# Patient Record
Sex: FEMALE | Race: BLACK OR AFRICAN AMERICAN | Employment: OTHER
[De-identification: names, ages, dates, MRNs, and addresses within clinical notes are randomized per-mention and may not be internally consistent; named-entity substitution may affect disease eponyms.]

---

## 2017-01-01 ENCOUNTER — HOME CARE VISIT (OUTPATIENT)
Dept: SCHEDULING | Facility: HOME HEALTH | Age: 82
End: 2017-01-01
Payer: MEDICARE

## 2017-01-01 ENCOUNTER — HOME CARE VISIT (OUTPATIENT)
Dept: HOSPICE | Facility: HOSPICE | Age: 82
End: 2017-01-01
Payer: MEDICARE

## 2017-01-01 ENCOUNTER — HOSPICE ADMISSION (OUTPATIENT)
Dept: HOSPICE | Facility: HOSPICE | Age: 82
End: 2017-01-01
Payer: MEDICARE

## 2017-01-01 ENCOUNTER — APPOINTMENT (OUTPATIENT)
Dept: GENERAL RADIOLOGY | Age: 82
DRG: 280 | End: 2017-01-01
Attending: EMERGENCY MEDICINE
Payer: MEDICARE

## 2017-01-01 ENCOUNTER — APPOINTMENT (OUTPATIENT)
Dept: GENERAL RADIOLOGY | Age: 82
End: 2017-01-01
Attending: EMERGENCY MEDICINE
Payer: MEDICARE

## 2017-01-01 ENCOUNTER — HOSPITAL ENCOUNTER (INPATIENT)
Age: 82
LOS: 4 days | Discharge: NURSING, REHAB OR LONG-TERM CARE | DRG: 280 | End: 2017-06-21
Attending: EMERGENCY MEDICINE | Admitting: HOSPITALIST
Payer: MEDICARE

## 2017-01-01 ENCOUNTER — APPOINTMENT (OUTPATIENT)
Dept: CT IMAGING | Age: 82
DRG: 065 | End: 2017-01-01
Attending: EMERGENCY MEDICINE
Payer: MEDICARE

## 2017-01-01 ENCOUNTER — APPOINTMENT (OUTPATIENT)
Dept: CT IMAGING | Age: 82
End: 2017-01-01
Attending: EMERGENCY MEDICINE
Payer: MEDICARE

## 2017-01-01 ENCOUNTER — APPOINTMENT (OUTPATIENT)
Dept: CT IMAGING | Age: 82
DRG: 065 | End: 2017-01-01
Attending: HOSPITALIST
Payer: MEDICARE

## 2017-01-01 ENCOUNTER — HOSPITAL ENCOUNTER (INPATIENT)
Age: 82
LOS: 3 days | Discharge: NURSING, REHAB OR LONG-TERM CARE | DRG: 065 | End: 2017-05-01
Attending: EMERGENCY MEDICINE | Admitting: HOSPITALIST
Payer: MEDICARE

## 2017-01-01 ENCOUNTER — APPOINTMENT (OUTPATIENT)
Dept: MRI IMAGING | Age: 82
DRG: 065 | End: 2017-01-01
Attending: HOSPITALIST
Payer: MEDICARE

## 2017-01-01 ENCOUNTER — HOSPITAL ENCOUNTER (EMERGENCY)
Age: 82
Discharge: HOME | End: 2017-01-10
Attending: EMERGENCY MEDICINE | Admitting: EMERGENCY MEDICINE
Payer: MEDICARE

## 2017-01-01 ENCOUNTER — HOSPITAL ENCOUNTER (INPATIENT)
Age: 82
LOS: 4 days | Discharge: HOSPICE | DRG: 280 | End: 2017-07-28
Attending: EMERGENCY MEDICINE | Admitting: INTERNAL MEDICINE
Payer: MEDICARE

## 2017-01-01 ENCOUNTER — APPOINTMENT (OUTPATIENT)
Dept: GENERAL RADIOLOGY | Age: 82
DRG: 280 | End: 2017-01-01
Attending: INTERNAL MEDICINE
Payer: MEDICARE

## 2017-01-01 VITALS
WEIGHT: 151.01 LBS | BODY MASS INDEX: 26.76 KG/M2 | HEIGHT: 63 IN | RESPIRATION RATE: 20 BRPM | DIASTOLIC BLOOD PRESSURE: 71 MMHG | OXYGEN SATURATION: 99 % | SYSTOLIC BLOOD PRESSURE: 138 MMHG | HEART RATE: 74 BPM

## 2017-01-01 VITALS
SYSTOLIC BLOOD PRESSURE: 130 MMHG | DIASTOLIC BLOOD PRESSURE: 80 MMHG | HEART RATE: 90 BPM | RESPIRATION RATE: 14 BRPM | OXYGEN SATURATION: 98 %

## 2017-01-01 VITALS
TEMPERATURE: 98 F | RESPIRATION RATE: 16 BRPM | WEIGHT: 143.08 LBS | SYSTOLIC BLOOD PRESSURE: 150 MMHG | BODY MASS INDEX: 25.35 KG/M2 | DIASTOLIC BLOOD PRESSURE: 82 MMHG | OXYGEN SATURATION: 100 % | HEART RATE: 76 BPM

## 2017-01-01 VITALS
TEMPERATURE: 98.7 F | OXYGEN SATURATION: 100 % | WEIGHT: 157.19 LBS | HEIGHT: 63 IN | BODY MASS INDEX: 27.85 KG/M2 | DIASTOLIC BLOOD PRESSURE: 44 MMHG | SYSTOLIC BLOOD PRESSURE: 124 MMHG | RESPIRATION RATE: 17 BRPM | HEART RATE: 64 BPM

## 2017-01-01 VITALS
RESPIRATION RATE: 13 BRPM | SYSTOLIC BLOOD PRESSURE: 116 MMHG | TEMPERATURE: 97.9 F | DIASTOLIC BLOOD PRESSURE: 53 MMHG | OXYGEN SATURATION: 100 % | HEART RATE: 64 BPM | BODY MASS INDEX: 24.59 KG/M2 | HEIGHT: 64 IN | WEIGHT: 144 LBS

## 2017-01-01 VITALS
SYSTOLIC BLOOD PRESSURE: 120 MMHG | OXYGEN SATURATION: 100 % | HEART RATE: 79 BPM | RESPIRATION RATE: 18 BRPM | DIASTOLIC BLOOD PRESSURE: 70 MMHG

## 2017-01-01 VITALS
SYSTOLIC BLOOD PRESSURE: 134 MMHG | RESPIRATION RATE: 18 BRPM | OXYGEN SATURATION: 94 % | HEART RATE: 59 BPM | DIASTOLIC BLOOD PRESSURE: 70 MMHG

## 2017-01-01 VITALS
DIASTOLIC BLOOD PRESSURE: 70 MMHG | RESPIRATION RATE: 21 BRPM | OXYGEN SATURATION: 99 % | HEART RATE: 74 BPM | SYSTOLIC BLOOD PRESSURE: 119 MMHG

## 2017-01-01 VITALS
SYSTOLIC BLOOD PRESSURE: 128 MMHG | HEIGHT: 63 IN | TEMPERATURE: 97.9 F | BODY MASS INDEX: 25.11 KG/M2 | RESPIRATION RATE: 18 BRPM | HEART RATE: 76 BPM | DIASTOLIC BLOOD PRESSURE: 73 MMHG | OXYGEN SATURATION: 100 % | WEIGHT: 141.7 LBS

## 2017-01-01 DIAGNOSIS — D64.9 ANEMIA, UNSPECIFIED: ICD-10-CM

## 2017-01-01 DIAGNOSIS — I50.33 ACUTE ON CHRONIC DIASTOLIC CONGESTIVE HEART FAILURE (HCC): Primary | ICD-10-CM

## 2017-01-01 DIAGNOSIS — I21.4 NSTEMI (NON-ST ELEVATED MYOCARDIAL INFARCTION) (HCC): ICD-10-CM

## 2017-01-01 DIAGNOSIS — R06.02 SOB (SHORTNESS OF BREATH): ICD-10-CM

## 2017-01-01 DIAGNOSIS — R47.81 SLURRED SPEECH: Primary | ICD-10-CM

## 2017-01-01 DIAGNOSIS — N18.9 CKD (CHRONIC KIDNEY DISEASE), UNSPECIFIED STAGE: ICD-10-CM

## 2017-01-01 DIAGNOSIS — R54 ADVANCED AGE: ICD-10-CM

## 2017-01-01 DIAGNOSIS — R06.03 RESPIRATORY DISTRESS: ICD-10-CM

## 2017-01-01 DIAGNOSIS — R53.1 ACUTE LEFT-SIDED WEAKNESS: ICD-10-CM

## 2017-01-01 DIAGNOSIS — Z71.89 ENCOUNTER FOR HOSPICE CARE DISCUSSION: ICD-10-CM

## 2017-01-01 DIAGNOSIS — N30.00 ACUTE CYSTITIS WITHOUT HEMATURIA: Primary | ICD-10-CM

## 2017-01-01 DIAGNOSIS — R77.8 ELEVATED TROPONIN: ICD-10-CM

## 2017-01-01 DIAGNOSIS — Z71.89 GOALS OF CARE, COUNSELING/DISCUSSION: ICD-10-CM

## 2017-01-01 DIAGNOSIS — R06.02 SHORTNESS OF BREATH: ICD-10-CM

## 2017-01-01 DIAGNOSIS — J96.00 ACUTE RESPIRATORY FAILURE, UNSPECIFIED WHETHER WITH HYPOXIA OR HYPERCAPNIA (HCC): ICD-10-CM

## 2017-01-01 DIAGNOSIS — R09.02 HYPOXIA: ICD-10-CM

## 2017-01-01 DIAGNOSIS — R53.81 DEBILITY: ICD-10-CM

## 2017-01-01 DIAGNOSIS — J96.01 ACUTE RESPIRATORY FAILURE WITH HYPOXIA (HCC): ICD-10-CM

## 2017-01-01 DIAGNOSIS — I48.20 CHRONIC ATRIAL FIBRILLATION (HCC): ICD-10-CM

## 2017-01-01 DIAGNOSIS — I50.9 CONGESTIVE HEART FAILURE, UNSPECIFIED CONGESTIVE HEART FAILURE CHRONICITY, UNSPECIFIED CONGESTIVE HEART FAILURE TYPE: Primary | ICD-10-CM

## 2017-01-01 LAB
ALBUMIN SERPL BCP-MCNC: 2 G/DL (ref 3.5–5)
ALBUMIN SERPL BCP-MCNC: 2.1 G/DL (ref 3.5–5)
ALBUMIN SERPL BCP-MCNC: 2.2 G/DL (ref 3.5–5)
ALBUMIN SERPL BCP-MCNC: 2.3 G/DL (ref 3.5–5)
ALBUMIN SERPL BCP-MCNC: 2.5 G/DL (ref 3.5–5)
ALBUMIN/GLOB SERPL: 0.3 {RATIO} (ref 1.1–2.2)
ALBUMIN/GLOB SERPL: 0.3 {RATIO} (ref 1.1–2.2)
ALBUMIN/GLOB SERPL: 0.4 {RATIO} (ref 1.1–2.2)
ALP SERPL-CCNC: 64 U/L (ref 45–117)
ALP SERPL-CCNC: 68 U/L (ref 45–117)
ALP SERPL-CCNC: 76 U/L (ref 45–117)
ALP SERPL-CCNC: 77 U/L (ref 45–117)
ALP SERPL-CCNC: 83 U/L (ref 45–117)
ALP SERPL-CCNC: 94 U/L (ref 45–117)
ALP SERPL-CCNC: 99 U/L (ref 45–117)
ALT SERPL-CCNC: 10 U/L (ref 12–78)
ALT SERPL-CCNC: 15 U/L (ref 12–78)
ALT SERPL-CCNC: 6 U/L (ref 12–78)
ALT SERPL-CCNC: 6 U/L (ref 12–78)
ALT SERPL-CCNC: 7 U/L (ref 12–78)
ALT SERPL-CCNC: 8 U/L (ref 12–78)
ALT SERPL-CCNC: 9 U/L (ref 12–78)
AMORPH CRY URNS QL MICRO: ABNORMAL
ANION GAP BLD CALC-SCNC: 10 MMOL/L (ref 5–15)
ANION GAP BLD CALC-SCNC: 11 MMOL/L (ref 5–15)
ANION GAP BLD CALC-SCNC: 11 MMOL/L (ref 5–15)
ANION GAP BLD CALC-SCNC: 12 MMOL/L (ref 5–15)
ANION GAP BLD CALC-SCNC: 7 MMOL/L (ref 5–15)
ANION GAP BLD CALC-SCNC: 7 MMOL/L (ref 5–15)
ANION GAP BLD CALC-SCNC: 8 MMOL/L (ref 5–15)
ANION GAP BLD CALC-SCNC: 8 MMOL/L (ref 5–15)
ANION GAP BLD CALC-SCNC: 9 MMOL/L (ref 5–15)
ANION GAP BLD CALC-SCNC: 9 MMOL/L (ref 5–15)
APPEARANCE UR: ABNORMAL
APPEARANCE UR: CLEAR
APTT PPP: 25.6 SEC (ref 22.1–32.5)
ARTERIAL PATENCY WRIST A: YES
AST SERPL W P-5'-P-CCNC: 10 U/L (ref 15–37)
AST SERPL W P-5'-P-CCNC: 11 U/L (ref 15–37)
AST SERPL W P-5'-P-CCNC: 15 U/L (ref 15–37)
AST SERPL W P-5'-P-CCNC: 20 U/L (ref 15–37)
AST SERPL W P-5'-P-CCNC: 23 U/L (ref 15–37)
AST SERPL W P-5'-P-CCNC: 8 U/L (ref 15–37)
AST SERPL W P-5'-P-CCNC: 8 U/L (ref 15–37)
ATRIAL RATE: 60 BPM
ATRIAL RATE: 77 BPM
ATRIAL RATE: 94 BPM
BACTERIA SPEC CULT: NORMAL
BACTERIA SPEC CULT: NORMAL
BACTERIA URNS QL MICRO: ABNORMAL /HPF
BACTERIA URNS QL MICRO: NEGATIVE /HPF
BASE DEFICIT BLDA-SCNC: 5.1 MMOL/L
BASOPHILS # BLD AUTO: 0 K/UL (ref 0–0.1)
BASOPHILS # BLD: 0 % (ref 0–1)
BASOPHILS # BLD: 1 % (ref 0–1)
BASOPHILS # BLD: 1 % (ref 0–1)
BDY SITE: ABNORMAL
BILIRUB SERPL-MCNC: 0.4 MG/DL (ref 0.2–1)
BILIRUB SERPL-MCNC: 0.5 MG/DL (ref 0.2–1)
BILIRUB SERPL-MCNC: 0.7 MG/DL (ref 0.2–1)
BILIRUB SERPL-MCNC: 0.8 MG/DL (ref 0.2–1)
BILIRUB SERPL-MCNC: 0.9 MG/DL (ref 0.2–1)
BILIRUB UR QL: NEGATIVE
BILIRUB UR QL: NEGATIVE
BNP SERPL-MCNC: ABNORMAL PG/ML (ref 0–450)
BNP SERPL-MCNC: ABNORMAL PG/ML (ref 0–450)
BUN SERPL-MCNC: 23 MG/DL (ref 6–20)
BUN SERPL-MCNC: 23 MG/DL (ref 6–20)
BUN SERPL-MCNC: 24 MG/DL (ref 6–20)
BUN SERPL-MCNC: 28 MG/DL (ref 6–20)
BUN SERPL-MCNC: 39 MG/DL (ref 6–20)
BUN SERPL-MCNC: 39 MG/DL (ref 6–20)
BUN SERPL-MCNC: 40 MG/DL (ref 6–20)
BUN SERPL-MCNC: 40 MG/DL (ref 6–20)
BUN SERPL-MCNC: 41 MG/DL (ref 6–20)
BUN SERPL-MCNC: 51 MG/DL (ref 6–20)
BUN/CREAT SERPL: 12 (ref 12–20)
BUN/CREAT SERPL: 13 (ref 12–20)
BUN/CREAT SERPL: 14 (ref 12–20)
BUN/CREAT SERPL: 15 (ref 12–20)
BUN/CREAT SERPL: 16 (ref 12–20)
BUN/CREAT SERPL: 16 (ref 12–20)
BUN/CREAT SERPL: 17 (ref 12–20)
BUN/CREAT SERPL: 17 (ref 12–20)
BUN/CREAT SERPL: 19 (ref 12–20)
BUN/CREAT SERPL: 19 (ref 12–20)
CALCIUM SERPL-MCNC: 7.7 MG/DL (ref 8.5–10.1)
CALCIUM SERPL-MCNC: 7.7 MG/DL (ref 8.5–10.1)
CALCIUM SERPL-MCNC: 8 MG/DL (ref 8.5–10.1)
CALCIUM SERPL-MCNC: 8 MG/DL (ref 8.5–10.1)
CALCIUM SERPL-MCNC: 8.1 MG/DL (ref 8.5–10.1)
CALCIUM SERPL-MCNC: 8.2 MG/DL (ref 8.5–10.1)
CALCIUM SERPL-MCNC: 8.3 MG/DL (ref 8.5–10.1)
CALCIUM SERPL-MCNC: 8.5 MG/DL (ref 8.5–10.1)
CALCULATED R AXIS, ECG10: -2 DEGREES
CALCULATED R AXIS, ECG10: -2 DEGREES
CALCULATED R AXIS, ECG10: -5 DEGREES
CALCULATED T AXIS, ECG11: -166 DEGREES
CALCULATED T AXIS, ECG11: 125 DEGREES
CALCULATED T AXIS, ECG11: 153 DEGREES
CAOX CRY URNS QL MICRO: ABNORMAL
CHLORIDE SERPL-SCNC: 103 MMOL/L (ref 97–108)
CHLORIDE SERPL-SCNC: 104 MMOL/L (ref 97–108)
CHLORIDE SERPL-SCNC: 105 MMOL/L (ref 97–108)
CHLORIDE SERPL-SCNC: 106 MMOL/L (ref 97–108)
CHLORIDE SERPL-SCNC: 107 MMOL/L (ref 97–108)
CHLORIDE SERPL-SCNC: 107 MMOL/L (ref 97–108)
CHLORIDE SERPL-SCNC: 108 MMOL/L (ref 97–108)
CHLORIDE SERPL-SCNC: 109 MMOL/L (ref 97–108)
CHOLEST SERPL-MCNC: 125 MG/DL
CK MB CFR SERPL CALC: 1.7 % (ref 0–2.5)
CK MB CFR SERPL CALC: NORMAL % (ref 0–2.5)
CK MB SERPL-MCNC: 1.2 NG/ML (ref 5–25)
CK MB SERPL-MCNC: <1 NG/ML (ref 5–25)
CK SERPL-CCNC: 59 U/L (ref 26–192)
CK SERPL-CCNC: 70 U/L (ref 26–192)
CO2 SERPL-SCNC: 20 MMOL/L (ref 21–32)
CO2 SERPL-SCNC: 21 MMOL/L (ref 21–32)
CO2 SERPL-SCNC: 23 MMOL/L (ref 21–32)
CO2 SERPL-SCNC: 23 MMOL/L (ref 21–32)
CO2 SERPL-SCNC: 25 MMOL/L (ref 21–32)
CO2 SERPL-SCNC: 26 MMOL/L (ref 21–32)
CO2 SERPL-SCNC: 26 MMOL/L (ref 21–32)
CO2 SERPL-SCNC: 27 MMOL/L (ref 21–32)
CO2 SERPL-SCNC: 29 MMOL/L (ref 21–32)
CO2 SERPL-SCNC: 29 MMOL/L (ref 21–32)
COLOR UR: ABNORMAL
COLOR UR: NORMAL
CREAT SERPL-MCNC: 1.77 MG/DL (ref 0.55–1.02)
CREAT SERPL-MCNC: 1.82 MG/DL (ref 0.55–1.02)
CREAT SERPL-MCNC: 1.84 MG/DL (ref 0.55–1.02)
CREAT SERPL-MCNC: 1.98 MG/DL (ref 0.55–1.02)
CREAT SERPL-MCNC: 2.18 MG/DL (ref 0.55–1.02)
CREAT SERPL-MCNC: 2.3 MG/DL (ref 0.55–1.02)
CREAT SERPL-MCNC: 2.3 MG/DL (ref 0.55–1.02)
CREAT SERPL-MCNC: 2.52 MG/DL (ref 0.55–1.02)
CREAT SERPL-MCNC: 2.53 MG/DL (ref 0.55–1.02)
CREAT SERPL-MCNC: 2.66 MG/DL (ref 0.55–1.02)
DIAGNOSIS, 93000: NORMAL
EOSINOPHIL # BLD: 0 K/UL (ref 0–0.4)
EOSINOPHIL # BLD: 0.1 K/UL (ref 0–0.4)
EOSINOPHIL # BLD: 0.2 K/UL (ref 0–0.4)
EOSINOPHIL NFR BLD: 1 % (ref 0–7)
EOSINOPHIL NFR BLD: 2 % (ref 0–7)
EOSINOPHIL NFR BLD: 3 % (ref 0–7)
EOSINOPHIL NFR BLD: 5 % (ref 0–7)
EPAP/CPAP/PEEP, PAPEEP: 5
EPITH CASTS URNS QL MICRO: ABNORMAL /LPF
EPITH CASTS URNS QL MICRO: NORMAL /LPF
ERYTHROCYTE [DISTWIDTH] IN BLOOD BY AUTOMATED COUNT: 14.6 % (ref 11.5–14.5)
ERYTHROCYTE [DISTWIDTH] IN BLOOD BY AUTOMATED COUNT: 14.8 % (ref 11.5–14.5)
ERYTHROCYTE [DISTWIDTH] IN BLOOD BY AUTOMATED COUNT: 15 % (ref 11.5–14.5)
ERYTHROCYTE [DISTWIDTH] IN BLOOD BY AUTOMATED COUNT: 15 % (ref 11.5–14.5)
ERYTHROCYTE [DISTWIDTH] IN BLOOD BY AUTOMATED COUNT: 15.1 % (ref 11.5–14.5)
ERYTHROCYTE [DISTWIDTH] IN BLOOD BY AUTOMATED COUNT: 15.7 % (ref 11.5–14.5)
ERYTHROCYTE [DISTWIDTH] IN BLOOD BY AUTOMATED COUNT: 15.9 % (ref 11.5–14.5)
ERYTHROCYTE [DISTWIDTH] IN BLOOD BY AUTOMATED COUNT: 16 % (ref 11.5–14.5)
ERYTHROCYTE [DISTWIDTH] IN BLOOD BY AUTOMATED COUNT: 16.4 % (ref 11.5–14.5)
EST. AVERAGE GLUCOSE BLD GHB EST-MCNC: NORMAL MG/DL
FIO2 ON VENT: 100 %
GAS FLOW.O2 SETTING OXYMISER: 4 L/MIN
GLOBULIN SER CALC-MCNC: 5.5 G/DL (ref 2–4)
GLOBULIN SER CALC-MCNC: 5.6 G/DL (ref 2–4)
GLOBULIN SER CALC-MCNC: 6 G/DL (ref 2–4)
GLOBULIN SER CALC-MCNC: 6.2 G/DL (ref 2–4)
GLOBULIN SER CALC-MCNC: 6.4 G/DL (ref 2–4)
GLOBULIN SER CALC-MCNC: 6.5 G/DL (ref 2–4)
GLOBULIN SER CALC-MCNC: 6.6 G/DL (ref 2–4)
GLUCOSE BLD STRIP.AUTO-MCNC: 101 MG/DL (ref 65–100)
GLUCOSE BLD STRIP.AUTO-MCNC: 61 MG/DL (ref 65–100)
GLUCOSE BLD STRIP.AUTO-MCNC: 85 MG/DL (ref 65–100)
GLUCOSE BLD STRIP.AUTO-MCNC: 99 MG/DL (ref 65–100)
GLUCOSE SERPL-MCNC: 105 MG/DL (ref 65–100)
GLUCOSE SERPL-MCNC: 110 MG/DL (ref 65–100)
GLUCOSE SERPL-MCNC: 112 MG/DL (ref 65–100)
GLUCOSE SERPL-MCNC: 125 MG/DL (ref 65–100)
GLUCOSE SERPL-MCNC: 139 MG/DL (ref 65–100)
GLUCOSE SERPL-MCNC: 62 MG/DL (ref 65–100)
GLUCOSE SERPL-MCNC: 66 MG/DL (ref 65–100)
GLUCOSE SERPL-MCNC: 71 MG/DL (ref 65–100)
GLUCOSE SERPL-MCNC: 73 MG/DL (ref 65–100)
GLUCOSE SERPL-MCNC: 88 MG/DL (ref 65–100)
GLUCOSE UR STRIP.AUTO-MCNC: NEGATIVE MG/DL
GLUCOSE UR STRIP.AUTO-MCNC: NEGATIVE MG/DL
GRAN CASTS URNS QL MICRO: ABNORMAL /LPF
HBA1C MFR BLD: 4.9 % (ref 4.2–6.3)
HCO3 BLDA-SCNC: 19 MMOL/L (ref 22–26)
HCT VFR BLD AUTO: 24.9 % (ref 35–47)
HCT VFR BLD AUTO: 25.2 % (ref 35–47)
HCT VFR BLD AUTO: 27.6 % (ref 35–47)
HCT VFR BLD AUTO: 27.8 % (ref 35–47)
HCT VFR BLD AUTO: 28.7 % (ref 35–47)
HCT VFR BLD AUTO: 28.7 % (ref 35–47)
HCT VFR BLD AUTO: 30.4 % (ref 35–47)
HCT VFR BLD AUTO: 30.4 % (ref 35–47)
HCT VFR BLD AUTO: 31.6 % (ref 35–47)
HDLC SERPL-MCNC: 53 MG/DL
HDLC SERPL: 2.4 {RATIO} (ref 0–5)
HGB BLD-MCNC: 8 G/DL (ref 11.5–16)
HGB BLD-MCNC: 8.4 G/DL (ref 11.5–16)
HGB BLD-MCNC: 9 G/DL (ref 11.5–16)
HGB BLD-MCNC: 9 G/DL (ref 11.5–16)
HGB BLD-MCNC: 9.2 G/DL (ref 11.5–16)
HGB BLD-MCNC: 9.3 G/DL (ref 11.5–16)
HGB BLD-MCNC: 9.3 G/DL (ref 11.5–16)
HGB BLD-MCNC: 9.6 G/DL (ref 11.5–16)
HGB BLD-MCNC: 9.8 G/DL (ref 11.5–16)
HGB UR QL STRIP: NEGATIVE
HGB UR QL STRIP: NEGATIVE
HYALINE CASTS URNS QL MICRO: ABNORMAL /LPF (ref 0–5)
HYALINE CASTS URNS QL MICRO: NORMAL /LPF (ref 0–5)
INR BLD: 1.2 (ref 0.9–1.2)
INR PPP: 1.2 (ref 0.9–1.1)
IPAP/PIP, IPAPIP: 12
KETONES UR QL STRIP.AUTO: NEGATIVE MG/DL
KETONES UR QL STRIP.AUTO: NEGATIVE MG/DL
LACTATE SERPL-SCNC: 1.6 MMOL/L (ref 0.4–2)
LACTATE SERPL-SCNC: 1.9 MMOL/L (ref 0.4–2)
LACTATE SERPL-SCNC: 2.8 MMOL/L (ref 0.4–2)
LACTATE SERPL-SCNC: 3.5 MMOL/L (ref 0.4–2)
LACTATE SERPL-SCNC: 3.9 MMOL/L (ref 0.4–2)
LDLC SERPL CALC-MCNC: 61.4 MG/DL (ref 0–100)
LEUKOCYTE ESTERASE UR QL STRIP.AUTO: ABNORMAL
LEUKOCYTE ESTERASE UR QL STRIP.AUTO: NEGATIVE
LIPID PROFILE,FLP: NORMAL
LYMPHOCYTES # BLD AUTO: 40 % (ref 12–49)
LYMPHOCYTES # BLD AUTO: 41 % (ref 12–49)
LYMPHOCYTES # BLD AUTO: 45 % (ref 12–49)
LYMPHOCYTES # BLD AUTO: 45 % (ref 12–49)
LYMPHOCYTES # BLD AUTO: 49 % (ref 12–49)
LYMPHOCYTES # BLD AUTO: 51 % (ref 12–49)
LYMPHOCYTES # BLD AUTO: 56 % (ref 12–49)
LYMPHOCYTES # BLD: 1.4 K/UL (ref 0.8–3.5)
LYMPHOCYTES # BLD: 1.5 K/UL (ref 0.8–3.5)
LYMPHOCYTES # BLD: 1.5 K/UL (ref 0.8–3.5)
LYMPHOCYTES # BLD: 1.6 K/UL (ref 0.8–3.5)
LYMPHOCYTES # BLD: 1.7 K/UL (ref 0.8–3.5)
LYMPHOCYTES # BLD: 2 K/UL (ref 0.8–3.5)
LYMPHOCYTES # BLD: 2 K/UL (ref 0.8–3.5)
MAGNESIUM SERPL-MCNC: 2 MG/DL (ref 1.6–2.4)
MAGNESIUM SERPL-MCNC: 2.1 MG/DL (ref 1.6–2.4)
MAGNESIUM SERPL-MCNC: 2.2 MG/DL (ref 1.6–2.4)
MCH RBC QN AUTO: 24.9 PG (ref 26–34)
MCH RBC QN AUTO: 25.7 PG (ref 26–34)
MCH RBC QN AUTO: 25.9 PG (ref 26–34)
MCH RBC QN AUTO: 26.1 PG (ref 26–34)
MCH RBC QN AUTO: 26.1 PG (ref 26–34)
MCH RBC QN AUTO: 26.2 PG (ref 26–34)
MCH RBC QN AUTO: 26.2 PG (ref 26–34)
MCH RBC QN AUTO: 26.4 PG (ref 26–34)
MCH RBC QN AUTO: 27 PG (ref 26–34)
MCHC RBC AUTO-ENTMCNC: 30.6 G/DL (ref 30–36.5)
MCHC RBC AUTO-ENTMCNC: 31 G/DL (ref 30–36.5)
MCHC RBC AUTO-ENTMCNC: 31.6 G/DL (ref 30–36.5)
MCHC RBC AUTO-ENTMCNC: 32.1 G/DL (ref 30–36.5)
MCHC RBC AUTO-ENTMCNC: 32.1 G/DL (ref 30–36.5)
MCHC RBC AUTO-ENTMCNC: 32.4 G/DL (ref 30–36.5)
MCHC RBC AUTO-ENTMCNC: 32.4 G/DL (ref 30–36.5)
MCHC RBC AUTO-ENTMCNC: 32.6 G/DL (ref 30–36.5)
MCHC RBC AUTO-ENTMCNC: 33.3 G/DL (ref 30–36.5)
MCV RBC AUTO: 80.2 FL (ref 80–99)
MCV RBC AUTO: 80.6 FL (ref 80–99)
MCV RBC AUTO: 81 FL (ref 80–99)
MCV RBC AUTO: 81.1 FL (ref 80–99)
MCV RBC AUTO: 81.5 FL (ref 80–99)
MCV RBC AUTO: 81.5 FL (ref 80–99)
MCV RBC AUTO: 81.8 FL (ref 80–99)
MCV RBC AUTO: 82.2 FL (ref 80–99)
MCV RBC AUTO: 82.7 FL (ref 80–99)
MONOCYTES # BLD: 0.1 K/UL (ref 0–1)
MONOCYTES # BLD: 0.2 K/UL (ref 0–1)
MONOCYTES # BLD: 0.3 K/UL (ref 0–1)
MONOCYTES # BLD: 0.4 K/UL (ref 0–1)
MONOCYTES # BLD: 0.5 K/UL (ref 0–1)
MONOCYTES NFR BLD AUTO: 10 % (ref 5–13)
MONOCYTES NFR BLD AUTO: 10 % (ref 5–13)
MONOCYTES NFR BLD AUTO: 13 % (ref 5–13)
MONOCYTES NFR BLD AUTO: 14 % (ref 5–13)
MONOCYTES NFR BLD AUTO: 3 % (ref 5–13)
MONOCYTES NFR BLD AUTO: 6 % (ref 5–13)
MONOCYTES NFR BLD AUTO: 9 % (ref 5–13)
NEUTS SEG # BLD: 1.1 K/UL (ref 1.8–8)
NEUTS SEG # BLD: 1.2 K/UL (ref 1.8–8)
NEUTS SEG # BLD: 1.3 K/UL (ref 1.8–8)
NEUTS SEG # BLD: 1.4 K/UL (ref 1.8–8)
NEUTS SEG # BLD: 1.5 K/UL (ref 1.8–8)
NEUTS SEG # BLD: 2 K/UL (ref 1.8–8)
NEUTS SEG # BLD: 2.1 K/UL (ref 1.8–8)
NEUTS SEG NFR BLD AUTO: 32 % (ref 32–75)
NEUTS SEG NFR BLD AUTO: 36 % (ref 32–75)
NEUTS SEG NFR BLD AUTO: 42 % (ref 32–75)
NEUTS SEG NFR BLD AUTO: 43 % (ref 32–75)
NEUTS SEG NFR BLD AUTO: 44 % (ref 32–75)
NEUTS SEG NFR BLD AUTO: 44 % (ref 32–75)
NEUTS SEG NFR BLD AUTO: 48 % (ref 32–75)
NITRITE UR QL STRIP.AUTO: NEGATIVE
NITRITE UR QL STRIP.AUTO: NEGATIVE
PCO2 BLDA: 32 MMHG (ref 35–45)
PH BLDA: 7.39 [PH] (ref 7.35–7.45)
PH UR STRIP: 5 [PH] (ref 5–8)
PH UR STRIP: 6.5 [PH] (ref 5–8)
PLATELET # BLD AUTO: 192 K/UL (ref 150–400)
PLATELET # BLD AUTO: 225 K/UL (ref 150–400)
PLATELET # BLD AUTO: 231 K/UL (ref 150–400)
PLATELET # BLD AUTO: 232 K/UL (ref 150–400)
PLATELET # BLD AUTO: 247 K/UL (ref 150–400)
PLATELET # BLD AUTO: 253 K/UL (ref 150–400)
PLATELET # BLD AUTO: 253 K/UL (ref 150–400)
PLATELET # BLD AUTO: 302 K/UL (ref 150–400)
PLATELET # BLD AUTO: 333 K/UL (ref 150–400)
PO2 BLDA: 399 MMHG (ref 80–100)
POTASSIUM SERPL-SCNC: 3.1 MMOL/L (ref 3.5–5.1)
POTASSIUM SERPL-SCNC: 3.7 MMOL/L (ref 3.5–5.1)
POTASSIUM SERPL-SCNC: 3.7 MMOL/L (ref 3.5–5.1)
POTASSIUM SERPL-SCNC: 3.8 MMOL/L (ref 3.5–5.1)
POTASSIUM SERPL-SCNC: 3.8 MMOL/L (ref 3.5–5.1)
POTASSIUM SERPL-SCNC: 4 MMOL/L (ref 3.5–5.1)
POTASSIUM SERPL-SCNC: 4 MMOL/L (ref 3.5–5.1)
POTASSIUM SERPL-SCNC: 4.1 MMOL/L (ref 3.5–5.1)
POTASSIUM SERPL-SCNC: 4.1 MMOL/L (ref 3.5–5.1)
POTASSIUM SERPL-SCNC: 4.5 MMOL/L (ref 3.5–5.1)
PROT SERPL-MCNC: 7.5 G/DL (ref 6.4–8.2)
PROT SERPL-MCNC: 7.7 G/DL (ref 6.4–8.2)
PROT SERPL-MCNC: 8.3 G/DL (ref 6.4–8.2)
PROT SERPL-MCNC: 8.4 G/DL (ref 6.4–8.2)
PROT SERPL-MCNC: 8.6 G/DL (ref 6.4–8.2)
PROT SERPL-MCNC: 8.7 G/DL (ref 6.4–8.2)
PROT SERPL-MCNC: 9.1 G/DL (ref 6.4–8.2)
PROT UR STRIP-MCNC: NEGATIVE MG/DL
PROT UR STRIP-MCNC: NEGATIVE MG/DL
PROTHROMBIN TIME: 12.3 SEC (ref 9–11.1)
Q-T INTERVAL, ECG07: 394 MS
Q-T INTERVAL, ECG07: 428 MS
Q-T INTERVAL, ECG07: 466 MS
QRS DURATION, ECG06: 64 MS
QRS DURATION, ECG06: 70 MS
QRS DURATION, ECG06: 76 MS
QTC CALCULATION (BEZET), ECG08: 430 MS
QTC CALCULATION (BEZET), ECG08: 489 MS
QTC CALCULATION (BEZET), ECG08: 495 MS
RBC # BLD AUTO: 3.07 M/UL (ref 3.8–5.2)
RBC # BLD AUTO: 3.11 M/UL (ref 3.8–5.2)
RBC # BLD AUTO: 3.44 M/UL (ref 3.8–5.2)
RBC # BLD AUTO: 3.45 M/UL (ref 3.8–5.2)
RBC # BLD AUTO: 3.51 M/UL (ref 3.8–5.2)
RBC # BLD AUTO: 3.52 M/UL (ref 3.8–5.2)
RBC # BLD AUTO: 3.7 M/UL (ref 3.8–5.2)
RBC # BLD AUTO: 3.73 M/UL (ref 3.8–5.2)
RBC # BLD AUTO: 3.82 M/UL (ref 3.8–5.2)
RBC #/AREA URNS HPF: ABNORMAL /HPF (ref 0–5)
RBC #/AREA URNS HPF: NORMAL /HPF (ref 0–5)
SAO2 % BLD: 100 % (ref 92–97)
SAO2% DEVICE SAO2% SENSOR NAME: ABNORMAL
SERVICE CMNT-IMP: ABNORMAL
SERVICE CMNT-IMP: ABNORMAL
SERVICE CMNT-IMP: NORMAL
SODIUM SERPL-SCNC: 139 MMOL/L (ref 136–145)
SODIUM SERPL-SCNC: 140 MMOL/L (ref 136–145)
SODIUM SERPL-SCNC: 141 MMOL/L (ref 136–145)
SODIUM SERPL-SCNC: 142 MMOL/L (ref 136–145)
SP GR UR REFRACTOMETRY: 1.01 (ref 1–1.03)
SP GR UR REFRACTOMETRY: 1.01 (ref 1–1.03)
SPECIMEN SITE: ABNORMAL
THERAPEUTIC RANGE,PTTT: NORMAL SECS (ref 58–77)
TRIGL SERPL-MCNC: 53 MG/DL (ref ?–150)
TROPONIN I SERPL-MCNC: 1.64 NG/ML
TROPONIN I SERPL-MCNC: 2.63 NG/ML
TROPONIN I SERPL-MCNC: 3.24 NG/ML
UROBILINOGEN UR QL STRIP.AUTO: 0.2 EU/DL (ref 0.2–1)
UROBILINOGEN UR QL STRIP.AUTO: 1 EU/DL (ref 0.2–1)
VENTILATION MODE VENT: ABNORMAL
VENTRICULAR RATE, ECG03: 61 BPM
VENTRICULAR RATE, ECG03: 68 BPM
VENTRICULAR RATE, ECG03: 93 BPM
VLDLC SERPL CALC-MCNC: 10.6 MG/DL
WBC # BLD AUTO: 3.2 K/UL (ref 3.6–11)
WBC # BLD AUTO: 3.4 K/UL (ref 3.6–11)
WBC # BLD AUTO: 3.5 K/UL (ref 3.6–11)
WBC # BLD AUTO: 3.5 K/UL (ref 3.6–11)
WBC # BLD AUTO: 3.8 K/UL (ref 3.6–11)
WBC # BLD AUTO: 4.4 K/UL (ref 3.6–11)
WBC # BLD AUTO: 4.5 K/UL (ref 3.6–11)
WBC URNS QL MICRO: ABNORMAL /HPF (ref 0–4)
WBC URNS QL MICRO: NORMAL /HPF (ref 0–4)

## 2017-01-01 PROCEDURE — A6250 SKIN SEAL PROTECT MOISTURIZR: HCPCS

## 2017-01-01 PROCEDURE — A9270 NON-COVERED ITEM OR SERVICE: HCPCS

## 2017-01-01 PROCEDURE — 0651 HSPC ROUTINE HOME CARE

## 2017-01-01 PROCEDURE — 74011250637 HC RX REV CODE- 250/637: Performed by: HOSPITALIST

## 2017-01-01 PROCEDURE — 80048 BASIC METABOLIC PNL TOTAL CA: CPT | Performed by: INTERNAL MEDICINE

## 2017-01-01 PROCEDURE — 84484 ASSAY OF TROPONIN QUANT: CPT | Performed by: EMERGENCY MEDICINE

## 2017-01-01 PROCEDURE — 77010033678 HC OXYGEN DAILY

## 2017-01-01 PROCEDURE — 74011250636 HC RX REV CODE- 250/636: Performed by: INTERNAL MEDICINE

## 2017-01-01 PROCEDURE — 83735 ASSAY OF MAGNESIUM: CPT | Performed by: INTERNAL MEDICINE

## 2017-01-01 PROCEDURE — 97165 OT EVAL LOW COMPLEX 30 MIN: CPT

## 2017-01-01 PROCEDURE — 80053 COMPREHEN METABOLIC PANEL: CPT | Performed by: INTERNAL MEDICINE

## 2017-01-01 PROCEDURE — 36415 COLL VENOUS BLD VENIPUNCTURE: CPT | Performed by: INTERNAL MEDICINE

## 2017-01-01 PROCEDURE — 83605 ASSAY OF LACTIC ACID: CPT | Performed by: EMERGENCY MEDICINE

## 2017-01-01 PROCEDURE — 97161 PT EVAL LOW COMPLEX 20 MIN: CPT

## 2017-01-01 PROCEDURE — 74011250637 HC RX REV CODE- 250/637: Performed by: INTERNAL MEDICINE

## 2017-01-01 PROCEDURE — 85025 COMPLETE CBC W/AUTO DIFF WBC: CPT | Performed by: EMERGENCY MEDICINE

## 2017-01-01 PROCEDURE — 65660000000 HC RM CCU STEPDOWN

## 2017-01-01 PROCEDURE — 74011250636 HC RX REV CODE- 250/636: Performed by: EMERGENCY MEDICINE

## 2017-01-01 PROCEDURE — T4523 ADULT SIZE BRIEF/DIAPER LG: HCPCS

## 2017-01-01 PROCEDURE — 94660 CPAP INITIATION&MGMT: CPT

## 2017-01-01 PROCEDURE — 99285 EMERGENCY DEPT VISIT HI MDM: CPT

## 2017-01-01 PROCEDURE — G8997 SWALLOW GOAL STATUS: HCPCS

## 2017-01-01 PROCEDURE — G0299 HHS/HOSPICE OF RN EA 15 MIN: HCPCS

## 2017-01-01 PROCEDURE — 93306 TTE W/DOPPLER COMPLETE: CPT

## 2017-01-01 PROCEDURE — C1751 CATH, INF, PER/CENT/MIDLINE: HCPCS

## 2017-01-01 PROCEDURE — 80061 LIPID PANEL: CPT | Performed by: HOSPITALIST

## 2017-01-01 PROCEDURE — 96361 HYDRATE IV INFUSION ADD-ON: CPT

## 2017-01-01 PROCEDURE — 74011250637 HC RX REV CODE- 250/637: Performed by: NURSE PRACTITIONER

## 2017-01-01 PROCEDURE — 74011250636 HC RX REV CODE- 250/636: Performed by: HOSPITALIST

## 2017-01-01 PROCEDURE — G0300 HHS/HOSPICE OF LPN EA 15 MIN: HCPCS

## 2017-01-01 PROCEDURE — 36415 COLL VENOUS BLD VENIPUNCTURE: CPT | Performed by: EMERGENCY MEDICINE

## 2017-01-01 PROCEDURE — 36600 WITHDRAWAL OF ARTERIAL BLOOD: CPT | Performed by: EMERGENCY MEDICINE

## 2017-01-01 PROCEDURE — 85027 COMPLETE CBC AUTOMATED: CPT | Performed by: INTERNAL MEDICINE

## 2017-01-01 PROCEDURE — 83036 HEMOGLOBIN GLYCOSYLATED A1C: CPT | Performed by: HOSPITALIST

## 2017-01-01 PROCEDURE — 80053 COMPREHEN METABOLIC PANEL: CPT | Performed by: EMERGENCY MEDICINE

## 2017-01-01 PROCEDURE — 93005 ELECTROCARDIOGRAM TRACING: CPT

## 2017-01-01 PROCEDURE — 74011250637 HC RX REV CODE- 250/637: Performed by: EMERGENCY MEDICINE

## 2017-01-01 PROCEDURE — 70450 CT HEAD/BRAIN W/O DYE: CPT

## 2017-01-01 PROCEDURE — 85027 COMPLETE CBC AUTOMATED: CPT | Performed by: HOSPITALIST

## 2017-01-01 PROCEDURE — 70551 MRI BRAIN STEM W/O DYE: CPT

## 2017-01-01 PROCEDURE — 92610 EVALUATE SWALLOWING FUNCTION: CPT

## 2017-01-01 PROCEDURE — 93880 EXTRACRANIAL BILAT STUDY: CPT

## 2017-01-01 PROCEDURE — 77030032490 HC SLV COMPR SCD KNE COVD -B

## 2017-01-01 PROCEDURE — HHS10554 SHAMPOO/BODY WASH 8 OZ ALOE VESTA

## 2017-01-01 PROCEDURE — G8978 MOBILITY CURRENT STATUS: HCPCS

## 2017-01-01 PROCEDURE — 83735 ASSAY OF MAGNESIUM: CPT | Performed by: HOSPITALIST

## 2017-01-01 PROCEDURE — 74011000250 HC RX REV CODE- 250

## 2017-01-01 PROCEDURE — 77030012879 HC MSK CPAP FLL FAC PHIL -B

## 2017-01-01 PROCEDURE — G8978 MOBILITY CURRENT STATUS: HCPCS | Performed by: PHYSICAL THERAPIST

## 2017-01-01 PROCEDURE — G8979 MOBILITY GOAL STATUS: HCPCS

## 2017-01-01 PROCEDURE — HOSPICE MEDICATION HC HH HOSPICE MEDICATION

## 2017-01-01 PROCEDURE — 71010 XR CHEST PORT: CPT

## 2017-01-01 PROCEDURE — 85025 COMPLETE CBC W/AUTO DIFF WBC: CPT | Performed by: INTERNAL MEDICINE

## 2017-01-01 PROCEDURE — 94761 N-INVAS EAR/PLS OXIMETRY MLT: CPT

## 2017-01-01 PROCEDURE — G8998 SWALLOW D/C STATUS: HCPCS

## 2017-01-01 PROCEDURE — 80048 BASIC METABOLIC PNL TOTAL CA: CPT | Performed by: HOSPITALIST

## 2017-01-01 PROCEDURE — 77030013140 HC MSK NEB VYRM -A

## 2017-01-01 PROCEDURE — 81001 URINALYSIS AUTO W/SCOPE: CPT | Performed by: INTERNAL MEDICINE

## 2017-01-01 PROCEDURE — 76450000000

## 2017-01-01 PROCEDURE — 97530 THERAPEUTIC ACTIVITIES: CPT | Performed by: PHYSICAL THERAPIST

## 2017-01-01 PROCEDURE — 81001 URINALYSIS AUTO W/SCOPE: CPT | Performed by: EMERGENCY MEDICINE

## 2017-01-01 PROCEDURE — 3336500001 HSPC ELECTION

## 2017-01-01 PROCEDURE — 82962 GLUCOSE BLOOD TEST: CPT

## 2017-01-01 PROCEDURE — 36415 COLL VENOUS BLD VENIPUNCTURE: CPT | Performed by: HOSPITALIST

## 2017-01-01 PROCEDURE — 82803 BLOOD GASES ANY COMBINATION: CPT | Performed by: EMERGENCY MEDICINE

## 2017-01-01 PROCEDURE — 85610 PROTHROMBIN TIME: CPT | Performed by: EMERGENCY MEDICINE

## 2017-01-01 PROCEDURE — 83880 ASSAY OF NATRIURETIC PEPTIDE: CPT | Performed by: EMERGENCY MEDICINE

## 2017-01-01 PROCEDURE — 82550 ASSAY OF CK (CPK): CPT | Performed by: EMERGENCY MEDICINE

## 2017-01-01 PROCEDURE — 96360 HYDRATION IV INFUSION INIT: CPT

## 2017-01-01 PROCEDURE — G0155 HHCP-SVS OF CSW,EA 15 MIN: HCPCS

## 2017-01-01 PROCEDURE — 97162 PT EVAL MOD COMPLEX 30 MIN: CPT | Performed by: PHYSICAL THERAPIST

## 2017-01-01 PROCEDURE — 84484 ASSAY OF TROPONIN QUANT: CPT | Performed by: HOSPITALIST

## 2017-01-01 PROCEDURE — 85610 PROTHROMBIN TIME: CPT

## 2017-01-01 PROCEDURE — 73130 X-RAY EXAM OF HAND: CPT

## 2017-01-01 PROCEDURE — 77030011943

## 2017-01-01 PROCEDURE — G8979 MOBILITY GOAL STATUS: HCPCS | Performed by: PHYSICAL THERAPIST

## 2017-01-01 PROCEDURE — 97535 SELF CARE MNGMENT TRAINING: CPT

## 2017-01-01 PROCEDURE — G0156 HHCP-SVS OF AIDE,EA 15 MIN: HCPCS

## 2017-01-01 PROCEDURE — 97116 GAIT TRAINING THERAPY: CPT

## 2017-01-01 PROCEDURE — 85730 THROMBOPLASTIN TIME PARTIAL: CPT | Performed by: EMERGENCY MEDICINE

## 2017-01-01 PROCEDURE — 83735 ASSAY OF MAGNESIUM: CPT | Performed by: EMERGENCY MEDICINE

## 2017-01-01 PROCEDURE — G8996 SWALLOW CURRENT STATUS: HCPCS

## 2017-01-01 PROCEDURE — 83605 ASSAY OF LACTIC ACID: CPT | Performed by: HOSPITALIST

## 2017-01-01 RX ORDER — ACETAMINOPHEN 325 MG/1
650 TABLET ORAL
Status: DISCONTINUED | OUTPATIENT
Start: 2017-01-01 | End: 2017-01-01 | Stop reason: HOSPADM

## 2017-01-01 RX ORDER — ACETAMINOPHEN 650 MG/1
650 SUPPOSITORY RECTAL
Status: DISCONTINUED | OUTPATIENT
Start: 2017-01-01 | End: 2017-01-01 | Stop reason: HOSPADM

## 2017-01-01 RX ORDER — ADHESIVE BANDAGE
30 BANDAGE TOPICAL DAILY PRN
COMMUNITY

## 2017-01-01 RX ORDER — ISOSORBIDE MONONITRATE 30 MG/1
30 TABLET, EXTENDED RELEASE ORAL DAILY
Status: DISCONTINUED | OUTPATIENT
Start: 2017-01-01 | End: 2017-01-01 | Stop reason: HOSPADM

## 2017-01-01 RX ORDER — FUROSEMIDE 10 MG/ML
40 INJECTION INTRAMUSCULAR; INTRAVENOUS 2 TIMES DAILY
Status: DISCONTINUED | OUTPATIENT
Start: 2017-01-01 | End: 2017-01-01

## 2017-01-01 RX ORDER — FUROSEMIDE 20 MG/1
20 TABLET ORAL 2 TIMES DAILY
Status: ON HOLD | COMMUNITY
End: 2017-01-01

## 2017-01-01 RX ORDER — HEPARIN SODIUM 5000 [USP'U]/ML
5000 INJECTION, SOLUTION INTRAVENOUS; SUBCUTANEOUS EVERY 12 HOURS
Status: DISCONTINUED | OUTPATIENT
Start: 2017-01-01 | End: 2017-01-01 | Stop reason: HOSPADM

## 2017-01-01 RX ORDER — POTASSIUM CHLORIDE 750 MG/1
10 TABLET, FILM COATED, EXTENDED RELEASE ORAL 2 TIMES DAILY
Status: DISCONTINUED | OUTPATIENT
Start: 2017-01-01 | End: 2017-01-01

## 2017-01-01 RX ORDER — MELATONIN
1000 DAILY
Status: DISCONTINUED | OUTPATIENT
Start: 2017-01-01 | End: 2017-01-01 | Stop reason: HOSPADM

## 2017-01-01 RX ORDER — CARVEDILOL 12.5 MG/1
12.5 TABLET ORAL 2 TIMES DAILY WITH MEALS
Status: DISCONTINUED | OUTPATIENT
Start: 2017-01-01 | End: 2017-01-01 | Stop reason: HOSPADM

## 2017-01-01 RX ORDER — FUROSEMIDE 40 MG/1
40 TABLET ORAL
Status: DISCONTINUED | OUTPATIENT
Start: 2017-01-01 | End: 2017-01-01 | Stop reason: HOSPADM

## 2017-01-01 RX ORDER — FUROSEMIDE 10 MG/ML
80 INJECTION INTRAMUSCULAR; INTRAVENOUS
Status: COMPLETED | OUTPATIENT
Start: 2017-01-01 | End: 2017-01-01

## 2017-01-01 RX ORDER — ASPIRIN 81 MG/1
81 TABLET ORAL DAILY
Status: DISCONTINUED | OUTPATIENT
Start: 2017-01-01 | End: 2017-01-01 | Stop reason: HOSPADM

## 2017-01-01 RX ORDER — TRAMADOL HYDROCHLORIDE 50 MG/1
50 TABLET ORAL
Qty: 40 TAB | Refills: 0 | Status: SHIPPED | OUTPATIENT
Start: 2017-01-01

## 2017-01-01 RX ORDER — PRAVASTATIN SODIUM 20 MG/1
20 TABLET ORAL
Status: DISCONTINUED | OUTPATIENT
Start: 2017-01-01 | End: 2017-01-01 | Stop reason: HOSPADM

## 2017-01-01 RX ORDER — ENOXAPARIN SODIUM 100 MG/ML
30 INJECTION SUBCUTANEOUS DAILY
Status: DISCONTINUED | OUTPATIENT
Start: 2017-01-01 | End: 2017-01-01 | Stop reason: HOSPADM

## 2017-01-01 RX ORDER — LOPERAMIDE HYDROCHLORIDE 2 MG/1
2 CAPSULE ORAL
COMMUNITY

## 2017-01-01 RX ORDER — ONDANSETRON 2 MG/ML
4 INJECTION INTRAMUSCULAR; INTRAVENOUS
Status: DISCONTINUED | OUTPATIENT
Start: 2017-01-01 | End: 2017-01-01 | Stop reason: HOSPADM

## 2017-01-01 RX ORDER — POTASSIUM CHLORIDE 750 MG/1
10 TABLET, FILM COATED, EXTENDED RELEASE ORAL DAILY
Status: DISCONTINUED | OUTPATIENT
Start: 2017-01-01 | End: 2017-01-01 | Stop reason: HOSPADM

## 2017-01-01 RX ORDER — SODIUM CHLORIDE 0.9 % (FLUSH) 0.9 %
5-10 SYRINGE (ML) INJECTION AS NEEDED
Status: DISCONTINUED | OUTPATIENT
Start: 2017-01-01 | End: 2017-01-01 | Stop reason: HOSPADM

## 2017-01-01 RX ORDER — MELATONIN
1000 DAILY
COMMUNITY
End: 2017-01-01 | Stop reason: ALTCHOICE

## 2017-01-01 RX ORDER — DEXTROMETHORPHAN POLISTIREX 30 MG/5 ML
SUSPENSION, EXTENDED RELEASE 12 HR ORAL AS NEEDED
COMMUNITY

## 2017-01-01 RX ORDER — POTASSIUM CHLORIDE 750 MG/1
10 TABLET, FILM COATED, EXTENDED RELEASE ORAL DAILY
Qty: 30 TAB | Refills: 1 | Status: SHIPPED | OUTPATIENT
Start: 2017-01-01

## 2017-01-01 RX ORDER — PRAVASTATIN SODIUM 10 MG/1
20 TABLET ORAL
Status: DISCONTINUED | OUTPATIENT
Start: 2017-01-01 | End: 2017-01-01 | Stop reason: HOSPADM

## 2017-01-01 RX ORDER — FUROSEMIDE 20 MG/1
20 TABLET ORAL 2 TIMES DAILY
Status: DISCONTINUED | OUTPATIENT
Start: 2017-01-01 | End: 2017-01-01 | Stop reason: HOSPADM

## 2017-01-01 RX ORDER — TRAMADOL HYDROCHLORIDE 50 MG/1
50 TABLET ORAL
Status: DISCONTINUED | OUTPATIENT
Start: 2017-01-01 | End: 2017-01-01 | Stop reason: HOSPADM

## 2017-01-01 RX ORDER — AMLODIPINE BESYLATE 5 MG/1
10 TABLET ORAL DAILY
Status: DISCONTINUED | OUTPATIENT
Start: 2017-01-01 | End: 2017-01-01 | Stop reason: HOSPADM

## 2017-01-01 RX ORDER — SODIUM CHLORIDE 0.9 % (FLUSH) 0.9 %
5-10 SYRINGE (ML) INJECTION EVERY 8 HOURS
Status: DISCONTINUED | OUTPATIENT
Start: 2017-01-01 | End: 2017-01-01 | Stop reason: HOSPADM

## 2017-01-01 RX ORDER — FACIAL-BODY WIPES
10 EACH TOPICAL AS NEEDED
COMMUNITY

## 2017-01-01 RX ORDER — FUROSEMIDE 10 MG/ML
40 INJECTION INTRAMUSCULAR; INTRAVENOUS
Status: COMPLETED | OUTPATIENT
Start: 2017-01-01 | End: 2017-01-01

## 2017-01-01 RX ORDER — LISINOPRIL 10 MG/1
20 TABLET ORAL DAILY
Status: DISCONTINUED | OUTPATIENT
Start: 2017-01-01 | End: 2017-01-01 | Stop reason: HOSPADM

## 2017-01-01 RX ORDER — PRAVASTATIN SODIUM 20 MG/1
20 TABLET ORAL
COMMUNITY
End: 2017-01-01

## 2017-01-01 RX ORDER — FUROSEMIDE 40 MG/1
40 TABLET ORAL DAILY
Status: DISCONTINUED | OUTPATIENT
Start: 2017-01-01 | End: 2017-01-01 | Stop reason: HOSPADM

## 2017-01-01 RX ORDER — ENOXAPARIN SODIUM 100 MG/ML
40 INJECTION SUBCUTANEOUS EVERY 24 HOURS
Status: DISCONTINUED | OUTPATIENT
Start: 2017-01-01 | End: 2017-01-01 | Stop reason: SDUPTHER

## 2017-01-01 RX ORDER — DEXTROSE 50 % IN WATER (D50W) INTRAVENOUS SYRINGE
Status: COMPLETED
Start: 2017-01-01 | End: 2017-01-01

## 2017-01-01 RX ORDER — ENOXAPARIN SODIUM 100 MG/ML
30 INJECTION SUBCUTANEOUS DAILY
Status: DISCONTINUED | OUTPATIENT
Start: 2017-01-01 | End: 2017-01-01

## 2017-01-01 RX ORDER — FUROSEMIDE 10 MG/ML
40 INJECTION INTRAMUSCULAR; INTRAVENOUS EVERY 12 HOURS
Status: DISCONTINUED | OUTPATIENT
Start: 2017-01-01 | End: 2017-01-01

## 2017-01-01 RX ORDER — ISOSORBIDE MONONITRATE 30 MG/1
60 TABLET, EXTENDED RELEASE ORAL DAILY
Status: DISCONTINUED | OUTPATIENT
Start: 2017-01-01 | End: 2017-01-01 | Stop reason: HOSPADM

## 2017-01-01 RX ORDER — ISOSORBIDE MONONITRATE 30 MG/1
30 TABLET, EXTENDED RELEASE ORAL DAILY
COMMUNITY
End: 2017-01-01 | Stop reason: ALTCHOICE

## 2017-01-01 RX ORDER — FUROSEMIDE 40 MG/1
40 TABLET ORAL DAILY
Qty: 30 TAB | Refills: 1 | Status: ON HOLD | OUTPATIENT
Start: 2017-01-01 | End: 2017-01-01

## 2017-01-01 RX ORDER — FUROSEMIDE 40 MG/1
40 TABLET ORAL 2 TIMES DAILY
Qty: 30 TAB | Refills: 1 | Status: SHIPPED | OUTPATIENT
Start: 2017-01-01

## 2017-01-01 RX ORDER — TRAMADOL HYDROCHLORIDE 50 MG/1
50 TABLET ORAL
Status: ON HOLD | COMMUNITY
End: 2017-01-01

## 2017-01-01 RX ORDER — DEXTROSE 50 % IN WATER (D50W) INTRAVENOUS SYRINGE
Status: DISPENSED
Start: 2017-01-01 | End: 2017-01-01

## 2017-01-01 RX ORDER — IPRATROPIUM BROMIDE AND ALBUTEROL SULFATE 2.5; .5 MG/3ML; MG/3ML
3 SOLUTION RESPIRATORY (INHALATION)
Status: DISCONTINUED | OUTPATIENT
Start: 2017-01-01 | End: 2017-01-01 | Stop reason: HOSPADM

## 2017-01-01 RX ORDER — POTASSIUM CHLORIDE 750 MG/1
40 TABLET, FILM COATED, EXTENDED RELEASE ORAL
Status: COMPLETED | OUTPATIENT
Start: 2017-01-01 | End: 2017-01-01

## 2017-01-01 RX ORDER — CEPHALEXIN 500 MG/1
500 CAPSULE ORAL
Status: COMPLETED | OUTPATIENT
Start: 2017-01-01 | End: 2017-01-01

## 2017-01-01 RX ORDER — COLCHICINE 0.6 MG/1
0.6 TABLET ORAL
COMMUNITY
End: 2017-01-01 | Stop reason: ALTCHOICE

## 2017-01-01 RX ORDER — ACETAMINOPHEN 325 MG/1
650 TABLET ORAL
Qty: 40 TAB | Refills: 0 | Status: SHIPPED | OUTPATIENT
Start: 2017-01-01

## 2017-01-01 RX ORDER — CEPHALEXIN 500 MG/1
500 CAPSULE ORAL 2 TIMES DAILY
Qty: 10 CAP | Refills: 0 | Status: SHIPPED | OUTPATIENT
Start: 2017-01-01 | End: 2017-01-01

## 2017-01-01 RX ORDER — ASPIRIN 325 MG
325 TABLET ORAL ONCE
Status: COMPLETED | OUTPATIENT
Start: 2017-01-01 | End: 2017-01-01

## 2017-01-01 RX ORDER — CLOPIDOGREL BISULFATE 75 MG/1
75 TABLET ORAL DAILY
Status: DISCONTINUED | OUTPATIENT
Start: 2017-01-01 | End: 2017-01-01 | Stop reason: HOSPADM

## 2017-01-01 RX ORDER — FUROSEMIDE 10 MG/ML
40 INJECTION INTRAMUSCULAR; INTRAVENOUS DAILY
Status: DISCONTINUED | OUTPATIENT
Start: 2017-01-01 | End: 2017-01-01

## 2017-01-01 RX ORDER — COLCHICINE 0.6 MG/1
0.6 TABLET ORAL
Status: DISCONTINUED | OUTPATIENT
Start: 2017-01-01 | End: 2017-01-01 | Stop reason: HOSPADM

## 2017-01-01 RX ADMIN — PRAVASTATIN SODIUM 20 MG: 10 TABLET ORAL at 23:07

## 2017-01-01 RX ADMIN — ISOSORBIDE MONONITRATE 30 MG: 30 TABLET, EXTENDED RELEASE ORAL at 10:12

## 2017-01-01 RX ADMIN — ENOXAPARIN SODIUM 30 MG: 30 INJECTION SUBCUTANEOUS at 10:05

## 2017-01-01 RX ADMIN — MORPHINE SULFATE 5 MG: 20 SOLUTION ORAL at 10:07

## 2017-01-01 RX ADMIN — CARVEDILOL 12.5 MG: 12.5 TABLET, FILM COATED ORAL at 10:05

## 2017-01-01 RX ADMIN — ASPIRIN 81 MG: 81 TABLET, COATED ORAL at 08:23

## 2017-01-01 RX ADMIN — ASPIRIN 81 MG: 81 TABLET, COATED ORAL at 09:52

## 2017-01-01 RX ADMIN — NITROGLYCERIN 1 INCH: 20 OINTMENT TOPICAL at 23:00

## 2017-01-01 RX ADMIN — Medication 10 ML: at 18:30

## 2017-01-01 RX ADMIN — POTASSIUM CHLORIDE 40 MEQ: 750 TABLET, FILM COATED, EXTENDED RELEASE ORAL at 12:41

## 2017-01-01 RX ADMIN — CARVEDILOL 12.5 MG: 12.5 TABLET, FILM COATED ORAL at 08:46

## 2017-01-01 RX ADMIN — PRAVASTATIN SODIUM 20 MG: 10 TABLET ORAL at 21:28

## 2017-01-01 RX ADMIN — FUROSEMIDE 40 MG: 10 INJECTION, SOLUTION INTRAMUSCULAR; INTRAVENOUS at 18:06

## 2017-01-01 RX ADMIN — ENOXAPARIN SODIUM 70 MG: 40 INJECTION SUBCUTANEOUS at 05:51

## 2017-01-01 RX ADMIN — NITROGLYCERIN 1 INCH: 20 OINTMENT TOPICAL at 12:44

## 2017-01-01 RX ADMIN — PRAVASTATIN SODIUM 20 MG: 10 TABLET ORAL at 22:18

## 2017-01-01 RX ADMIN — PRAVASTATIN SODIUM 20 MG: 10 TABLET ORAL at 21:04

## 2017-01-01 RX ADMIN — Medication 10 ML: at 22:10

## 2017-01-01 RX ADMIN — CLOPIDOGREL BISULFATE 75 MG: 75 TABLET ORAL at 13:32

## 2017-01-01 RX ADMIN — SODIUM CHLORIDE 250 ML: 900 INJECTION, SOLUTION INTRAVENOUS at 15:54

## 2017-01-01 RX ADMIN — POTASSIUM CHLORIDE 10 MEQ: 750 TABLET, FILM COATED, EXTENDED RELEASE ORAL at 09:03

## 2017-01-01 RX ADMIN — POTASSIUM CHLORIDE 10 MEQ: 750 TABLET, FILM COATED, EXTENDED RELEASE ORAL at 09:14

## 2017-01-01 RX ADMIN — ENOXAPARIN SODIUM 30 MG: 30 INJECTION SUBCUTANEOUS at 09:52

## 2017-01-01 RX ADMIN — FUROSEMIDE 20 MG: 20 TABLET ORAL at 11:38

## 2017-01-01 RX ADMIN — ISOSORBIDE MONONITRATE 30 MG: 30 TABLET, EXTENDED RELEASE ORAL at 09:16

## 2017-01-01 RX ADMIN — HEPARIN SODIUM 5000 UNITS: 5000 INJECTION, SOLUTION INTRAVENOUS; SUBCUTANEOUS at 22:54

## 2017-01-01 RX ADMIN — ACETAMINOPHEN 650 MG: 325 TABLET, FILM COATED ORAL at 03:28

## 2017-01-01 RX ADMIN — FUROSEMIDE 40 MG: 10 INJECTION, SOLUTION INTRAMUSCULAR; INTRAVENOUS at 10:12

## 2017-01-01 RX ADMIN — PRAVASTATIN SODIUM 20 MG: 10 TABLET ORAL at 22:09

## 2017-01-01 RX ADMIN — CARVEDILOL 12.5 MG: 12.5 TABLET, FILM COATED ORAL at 10:13

## 2017-01-01 RX ADMIN — CARVEDILOL 12.5 MG: 12.5 TABLET, FILM COATED ORAL at 18:06

## 2017-01-01 RX ADMIN — VITAMIN D, TAB 1000IU (100/BT) 1000 UNITS: 25 TAB at 10:27

## 2017-01-01 RX ADMIN — ACETAMINOPHEN 650 MG: 325 TABLET, FILM COATED ORAL at 10:27

## 2017-01-01 RX ADMIN — HEPARIN SODIUM 5000 UNITS: 5000 INJECTION, SOLUTION INTRAVENOUS; SUBCUTANEOUS at 13:33

## 2017-01-01 RX ADMIN — Medication 10 ML: at 14:53

## 2017-01-01 RX ADMIN — ASPIRIN 81 MG: 81 TABLET, COATED ORAL at 09:04

## 2017-01-01 RX ADMIN — Medication 10 ML: at 17:21

## 2017-01-01 RX ADMIN — DEXTROSE MONOHYDRATE 12.5 G: 25 INJECTION, SOLUTION INTRAVENOUS at 01:44

## 2017-01-01 RX ADMIN — Medication 10 ML: at 20:50

## 2017-01-01 RX ADMIN — NITROGLYCERIN 1 INCH: 20 OINTMENT TOPICAL at 18:28

## 2017-01-01 RX ADMIN — Medication: at 22:15

## 2017-01-01 RX ADMIN — CARVEDILOL 12.5 MG: 12.5 TABLET, FILM COATED ORAL at 09:48

## 2017-01-01 RX ADMIN — SODIUM CHLORIDE 250 ML: 900 INJECTION, SOLUTION INTRAVENOUS at 15:44

## 2017-01-01 RX ADMIN — NITROGLYCERIN 1 INCH: 20 OINTMENT TOPICAL at 06:22

## 2017-01-01 RX ADMIN — POTASSIUM CHLORIDE 10 MEQ: 750 TABLET, FILM COATED, EXTENDED RELEASE ORAL at 16:09

## 2017-01-01 RX ADMIN — ASPIRIN 81 MG: 81 TABLET, COATED ORAL at 09:14

## 2017-01-01 RX ADMIN — Medication 10 ML: at 06:00

## 2017-01-01 RX ADMIN — NITROGLYCERIN 1 INCH: 20 OINTMENT TOPICAL at 06:13

## 2017-01-01 RX ADMIN — HEPARIN SODIUM 5000 UNITS: 5000 INJECTION, SOLUTION INTRAVENOUS; SUBCUTANEOUS at 22:57

## 2017-01-01 RX ADMIN — AMLODIPINE BESYLATE 10 MG: 5 TABLET ORAL at 10:34

## 2017-01-01 RX ADMIN — PRAVASTATIN SODIUM 20 MG: 10 TABLET ORAL at 21:12

## 2017-01-01 RX ADMIN — ACETAMINOPHEN 650 MG: 325 TABLET, FILM COATED ORAL at 08:46

## 2017-01-01 RX ADMIN — CLOPIDOGREL BISULFATE 75 MG: 75 TABLET ORAL at 08:47

## 2017-01-01 RX ADMIN — Medication 10 ML: at 14:39

## 2017-01-01 RX ADMIN — CLOPIDOGREL BISULFATE 75 MG: 75 TABLET ORAL at 09:03

## 2017-01-01 RX ADMIN — CARVEDILOL 12.5 MG: 12.5 TABLET, FILM COATED ORAL at 08:31

## 2017-01-01 RX ADMIN — FUROSEMIDE 20 MG: 20 TABLET ORAL at 08:10

## 2017-01-01 RX ADMIN — CLOPIDOGREL BISULFATE 75 MG: 75 TABLET ORAL at 10:27

## 2017-01-01 RX ADMIN — CARVEDILOL 12.5 MG: 12.5 TABLET, FILM COATED ORAL at 18:18

## 2017-01-01 RX ADMIN — CLOPIDOGREL BISULFATE 75 MG: 75 TABLET ORAL at 10:04

## 2017-01-01 RX ADMIN — CARVEDILOL 12.5 MG: 12.5 TABLET, FILM COATED ORAL at 09:15

## 2017-01-01 RX ADMIN — Medication 10 ML: at 06:17

## 2017-01-01 RX ADMIN — NITROGLYCERIN 1 INCH: 20 OINTMENT TOPICAL at 23:26

## 2017-01-01 RX ADMIN — Medication 10 ML: at 18:07

## 2017-01-01 RX ADMIN — POTASSIUM CHLORIDE 10 MEQ: 750 TABLET, FILM COATED, EXTENDED RELEASE ORAL at 08:31

## 2017-01-01 RX ADMIN — CARVEDILOL 12.5 MG: 12.5 TABLET, FILM COATED ORAL at 18:26

## 2017-01-01 RX ADMIN — CARVEDILOL 12.5 MG: 12.5 TABLET, FILM COATED ORAL at 16:09

## 2017-01-01 RX ADMIN — Medication 10 ML: at 07:00

## 2017-01-01 RX ADMIN — CLOPIDOGREL BISULFATE 75 MG: 75 TABLET ORAL at 08:11

## 2017-01-01 RX ADMIN — ASPIRIN 81 MG: 81 TABLET, COATED ORAL at 10:27

## 2017-01-01 RX ADMIN — CARVEDILOL 12.5 MG: 12.5 TABLET, FILM COATED ORAL at 18:55

## 2017-01-01 RX ADMIN — CARVEDILOL 12.5 MG: 12.5 TABLET, FILM COATED ORAL at 17:31

## 2017-01-01 RX ADMIN — CLOPIDOGREL BISULFATE 75 MG: 75 TABLET ORAL at 10:11

## 2017-01-01 RX ADMIN — NITROGLYCERIN 1 INCH: 20 OINTMENT TOPICAL at 18:06

## 2017-01-01 RX ADMIN — POTASSIUM CHLORIDE 10 MEQ: 750 TABLET, FILM COATED, EXTENDED RELEASE ORAL at 18:06

## 2017-01-01 RX ADMIN — NITROGLYCERIN 1 INCH: 20 OINTMENT TOPICAL at 23:40

## 2017-01-01 RX ADMIN — CLOPIDOGREL BISULFATE 75 MG: 75 TABLET ORAL at 09:52

## 2017-01-01 RX ADMIN — ASPIRIN 81 MG: 81 TABLET, COATED ORAL at 08:10

## 2017-01-01 RX ADMIN — NITROGLYCERIN 1 INCH: 20 OINTMENT TOPICAL at 06:00

## 2017-01-01 RX ADMIN — ISOSORBIDE MONONITRATE 30 MG: 30 TABLET, EXTENDED RELEASE ORAL at 08:31

## 2017-01-01 RX ADMIN — NITROGLYCERIN 1 INCH: 20 OINTMENT TOPICAL at 18:20

## 2017-01-01 RX ADMIN — FUROSEMIDE 40 MG: 10 INJECTION, SOLUTION INTRAMUSCULAR; INTRAVENOUS at 18:26

## 2017-01-01 RX ADMIN — NITROGLYCERIN 1 INCH: 20 OINTMENT TOPICAL at 09:06

## 2017-01-01 RX ADMIN — ENOXAPARIN SODIUM 70 MG: 40 INJECTION SUBCUTANEOUS at 06:22

## 2017-01-01 RX ADMIN — Medication 10 ML: at 22:06

## 2017-01-01 RX ADMIN — FUROSEMIDE 40 MG: 10 INJECTION, SOLUTION INTRAMUSCULAR; INTRAVENOUS at 18:56

## 2017-01-01 RX ADMIN — NITROGLYCERIN 1 INCH: 20 OINTMENT TOPICAL at 05:50

## 2017-01-01 RX ADMIN — AMLODIPINE BESYLATE 10 MG: 5 TABLET ORAL at 08:10

## 2017-01-01 RX ADMIN — NITROGLYCERIN 1 INCH: 20 OINTMENT TOPICAL at 12:15

## 2017-01-01 RX ADMIN — ISOSORBIDE MONONITRATE 30 MG: 30 TABLET, EXTENDED RELEASE ORAL at 08:10

## 2017-01-01 RX ADMIN — ASPIRIN 81 MG: 81 TABLET, COATED ORAL at 10:04

## 2017-01-01 RX ADMIN — ASPIRIN 81 MG: 81 TABLET, COATED ORAL at 09:47

## 2017-01-01 RX ADMIN — ASPIRIN 81 MG: 81 TABLET, COATED ORAL at 08:46

## 2017-01-01 RX ADMIN — VITAMIN D, TAB 1000IU (100/BT) 1000 UNITS: 25 TAB at 10:05

## 2017-01-01 RX ADMIN — CARVEDILOL 18.75 MG: 12.5 TABLET, FILM COATED ORAL at 08:10

## 2017-01-01 RX ADMIN — ASPIRIN 81 MG: 81 TABLET, COATED ORAL at 10:11

## 2017-01-01 RX ADMIN — POTASSIUM CHLORIDE 10 MEQ: 750 TABLET, FILM COATED, EXTENDED RELEASE ORAL at 10:05

## 2017-01-01 RX ADMIN — FUROSEMIDE 80 MG: 10 INJECTION, SOLUTION INTRAMUSCULAR; INTRAVENOUS at 12:15

## 2017-01-01 RX ADMIN — CLOPIDOGREL BISULFATE 75 MG: 75 TABLET ORAL at 09:13

## 2017-01-01 RX ADMIN — NITROGLYCERIN 1 INCH: 20 OINTMENT TOPICAL at 18:26

## 2017-01-01 RX ADMIN — NITROGLYCERIN 1 INCH: 20 OINTMENT TOPICAL at 23:59

## 2017-01-01 RX ADMIN — Medication 10 ML: at 05:43

## 2017-01-01 RX ADMIN — CARVEDILOL 18.75 MG: 12.5 TABLET, FILM COATED ORAL at 17:13

## 2017-01-01 RX ADMIN — CARVEDILOL 12.5 MG: 12.5 TABLET, FILM COATED ORAL at 18:29

## 2017-01-01 RX ADMIN — CLOPIDOGREL BISULFATE 75 MG: 75 TABLET ORAL at 08:23

## 2017-01-01 RX ADMIN — HEPARIN SODIUM 5000 UNITS: 5000 INJECTION, SOLUTION INTRAVENOUS; SUBCUTANEOUS at 22:05

## 2017-01-01 RX ADMIN — ACETAMINOPHEN 650 MG: 325 TABLET, FILM COATED ORAL at 18:06

## 2017-01-01 RX ADMIN — PRAVASTATIN SODIUM 20 MG: 10 TABLET ORAL at 22:05

## 2017-01-01 RX ADMIN — ASPIRIN 325 MG ORAL TABLET 325 MG: 325 PILL ORAL at 05:25

## 2017-01-01 RX ADMIN — AMLODIPINE BESYLATE 10 MG: 5 TABLET ORAL at 09:47

## 2017-01-01 RX ADMIN — VITAMIN D, TAB 1000IU (100/BT) 1000 UNITS: 25 TAB at 08:46

## 2017-01-01 RX ADMIN — Medication 10 ML: at 18:27

## 2017-01-01 RX ADMIN — CLOPIDOGREL BISULFATE 75 MG: 75 TABLET ORAL at 08:31

## 2017-01-01 RX ADMIN — Medication 10 ML: at 22:19

## 2017-01-01 RX ADMIN — Medication 10 ML: at 22:58

## 2017-01-01 RX ADMIN — FUROSEMIDE 20 MG: 20 TABLET ORAL at 17:13

## 2017-01-01 RX ADMIN — Medication 10 ML: at 06:33

## 2017-01-01 RX ADMIN — Medication 10 ML: at 15:40

## 2017-01-01 RX ADMIN — ACETAMINOPHEN 650 MG: 325 TABLET, FILM COATED ORAL at 08:47

## 2017-01-01 RX ADMIN — ASPIRIN 81 MG: 81 TABLET, COATED ORAL at 08:31

## 2017-01-01 RX ADMIN — CARVEDILOL 12.5 MG: 12.5 TABLET, FILM COATED ORAL at 10:28

## 2017-01-01 RX ADMIN — LISINOPRIL 20 MG: 10 TABLET ORAL at 10:34

## 2017-01-01 RX ADMIN — Medication: at 10:12

## 2017-01-01 RX ADMIN — Medication: at 18:31

## 2017-01-01 RX ADMIN — POTASSIUM CHLORIDE 10 MEQ: 750 TABLET, FILM COATED, EXTENDED RELEASE ORAL at 09:52

## 2017-01-01 RX ADMIN — FUROSEMIDE 40 MG: 10 INJECTION, SOLUTION INTRAMUSCULAR; INTRAVENOUS at 11:50

## 2017-01-01 RX ADMIN — HEPARIN SODIUM 5000 UNITS: 5000 INJECTION, SOLUTION INTRAVENOUS; SUBCUTANEOUS at 09:56

## 2017-01-01 RX ADMIN — FUROSEMIDE 40 MG: 40 TABLET ORAL at 08:30

## 2017-01-01 RX ADMIN — CARVEDILOL 12.5 MG: 12.5 TABLET, FILM COATED ORAL at 18:28

## 2017-01-01 RX ADMIN — FUROSEMIDE 40 MG: 40 TABLET ORAL at 18:29

## 2017-01-01 RX ADMIN — NITROGLYCERIN 1 INCH: 20 OINTMENT TOPICAL at 05:21

## 2017-01-01 RX ADMIN — POTASSIUM CHLORIDE 10 MEQ: 750 TABLET, FILM COATED, EXTENDED RELEASE ORAL at 18:26

## 2017-01-01 RX ADMIN — LISINOPRIL 20 MG: 10 TABLET ORAL at 08:10

## 2017-01-01 RX ADMIN — POTASSIUM CHLORIDE 10 MEQ: 750 TABLET, FILM COATED, EXTENDED RELEASE ORAL at 10:12

## 2017-01-01 RX ADMIN — POTASSIUM CHLORIDE 10 MEQ: 750 TABLET, FILM COATED, EXTENDED RELEASE ORAL at 08:46

## 2017-01-01 RX ADMIN — CLOPIDOGREL BISULFATE 75 MG: 75 TABLET ORAL at 09:48

## 2017-01-01 RX ADMIN — Medication 10 ML: at 18:29

## 2017-01-01 RX ADMIN — CEPHALEXIN 500 MG: 500 CAPSULE ORAL at 16:59

## 2017-01-01 RX ADMIN — Medication 10 ML: at 22:05

## 2017-01-01 RX ADMIN — CARVEDILOL 12.5 MG: 12.5 TABLET, FILM COATED ORAL at 09:52

## 2017-01-01 RX ADMIN — PRAVASTATIN SODIUM 20 MG: 10 TABLET ORAL at 21:22

## 2017-01-01 RX ADMIN — FUROSEMIDE 40 MG: 10 INJECTION, SOLUTION INTRAMUSCULAR; INTRAVENOUS at 09:05

## 2017-01-01 RX ADMIN — FUROSEMIDE 40 MG: 10 INJECTION, SOLUTION INTRAMUSCULAR; INTRAVENOUS at 10:27

## 2017-01-01 RX ADMIN — PRAVASTATIN SODIUM 20 MG: 20 TABLET ORAL at 22:06

## 2017-01-01 RX ADMIN — Medication 10 ML: at 10:28

## 2017-01-01 RX ADMIN — POTASSIUM CHLORIDE 10 MEQ: 750 TABLET, FILM COATED, EXTENDED RELEASE ORAL at 13:33

## 2017-01-01 RX ADMIN — PRAVASTATIN SODIUM 20 MG: 20 TABLET ORAL at 20:50

## 2017-01-01 RX ADMIN — FUROSEMIDE 40 MG: 10 INJECTION, SOLUTION INTRAMUSCULAR; INTRAVENOUS at 18:21

## 2017-01-01 RX ADMIN — FUROSEMIDE 40 MG: 10 INJECTION, SOLUTION INTRAMUSCULAR; INTRAVENOUS at 18:58

## 2017-01-01 RX ADMIN — Medication 10 ML: at 11:52

## 2017-01-01 RX ADMIN — FUROSEMIDE 40 MG: 10 INJECTION, SOLUTION INTRAMUSCULAR; INTRAVENOUS at 21:14

## 2017-01-01 RX ADMIN — ISOSORBIDE MONONITRATE 30 MG: 30 TABLET, EXTENDED RELEASE ORAL at 11:38

## 2017-01-01 RX ADMIN — ENOXAPARIN SODIUM 70 MG: 40 INJECTION SUBCUTANEOUS at 06:11

## 2017-01-01 RX ADMIN — CLOPIDOGREL BISULFATE 75 MG: 75 TABLET ORAL at 16:08

## 2017-01-01 RX ADMIN — Medication 10 ML: at 18:56

## 2017-01-01 RX ADMIN — FUROSEMIDE 40 MG: 10 INJECTION, SOLUTION INTRAMUSCULAR; INTRAVENOUS at 08:47

## 2017-01-01 RX ADMIN — HEPARIN SODIUM 5000 UNITS: 5000 INJECTION, SOLUTION INTRAVENOUS; SUBCUTANEOUS at 11:33

## 2017-01-01 RX ADMIN — CARVEDILOL 12.5 MG: 12.5 TABLET, FILM COATED ORAL at 09:03

## 2017-01-01 RX ADMIN — FUROSEMIDE 40 MG: 10 INJECTION, SOLUTION INTRAMUSCULAR; INTRAVENOUS at 09:15

## 2017-01-01 RX ADMIN — Medication 10 ML: at 22:00

## 2017-01-01 RX ADMIN — FUROSEMIDE 40 MG: 10 INJECTION, SOLUTION INTRAMUSCULAR; INTRAVENOUS at 06:03

## 2017-01-01 RX ADMIN — ISOSORBIDE MONONITRATE 60 MG: 30 TABLET, EXTENDED RELEASE ORAL at 10:05

## 2017-01-01 RX ADMIN — FUROSEMIDE 40 MG: 40 TABLET ORAL at 10:05

## 2017-01-01 RX ADMIN — ASPIRIN 81 MG: 81 TABLET, COATED ORAL at 13:32

## 2017-01-01 RX ADMIN — FUROSEMIDE 20 MG: 20 TABLET ORAL at 17:20

## 2017-01-01 RX ADMIN — POTASSIUM CHLORIDE 10 MEQ: 750 TABLET, FILM COATED, EXTENDED RELEASE ORAL at 10:27

## 2017-01-01 RX ADMIN — VITAMIN D, TAB 1000IU (100/BT) 1000 UNITS: 25 TAB at 09:51

## 2017-01-01 RX ADMIN — NITROGLYCERIN 1 INCH: 20 OINTMENT TOPICAL at 12:00

## 2017-01-10 NOTE — ED NOTES
Continue to wait for [facility] transport back to [facility].  ETA 25 minutes

## 2017-01-10 NOTE — DISCHARGE INSTRUCTIONS
Urinary Tract Infection in Women: Care Instructions  Your Care Instructions    A urinary tract infection, or UTI, is a general term for an infection anywhere between the kidneys and the urethra (where urine comes out). Most UTIs are bladder infections. They often cause pain or burning when you urinate. UTIs are caused by bacteria and can be cured with antibiotics. Be sure to complete your treatment so that the infection goes away. Follow-up care is a key part of your treatment and safety. Be sure to make and go to all appointments, and call your doctor if you are having problems. It's also a good idea to know your test results and keep a list of the medicines you take. How can you care for yourself at home? · Take your antibiotics as directed. Do not stop taking them just because you feel better. You need to take the full course of antibiotics. · Drink extra water and other fluids for the next day or two. This may help wash out the bacteria that are causing the infection. (If you have kidney, heart, or liver disease and have to limit fluids, talk with your doctor before you increase your fluid intake.)  · Avoid drinks that are carbonated or have caffeine. They can irritate the bladder. · Urinate often. Try to empty your bladder each time. · To relieve pain, take a hot bath or lay a heating pad set on low over your lower belly or genital area. Never go to sleep with a heating pad in place. To prevent UTIs  · Drink plenty of water each day. This helps you urinate often, which clears bacteria from your system. (If you have kidney, heart, or liver disease and have to limit fluids, talk with your doctor before you increase your fluid intake.)  · Consider adding cranberry juice to your diet. · Urinate when you need to. · Urinate right after you have sex. · Change sanitary pads often. · Avoid douches, bubble baths, feminine hygiene sprays, and other feminine hygiene products that have deodorants.   · After going to the bathroom, wipe from front to back. When should you call for help? Call your doctor now or seek immediate medical care if:  · Symptoms such as fever, chills, nausea, or vomiting get worse or appear for the first time. · You have new pain in your back just below your rib cage. This is called flank pain. · There is new blood or pus in your urine. · You have any problems with your antibiotic medicine. Watch closely for changes in your health, and be sure to contact your doctor if:  · You are not getting better after taking an antibiotic for 2 days. · Your symptoms go away but then come back. Where can you learn more? Go to http://[employer].info/. Enter M767 in the search box to learn more about \"Urinary Tract Infection in Women: Care Instructions. \"  Current as of: August 12, 2016  Content Version: 11.1  © 6304-6472 [employer]. Care instructions adapted under license by [employer] (which disclaims liability or warranty for this information). If you have questions about a medical condition or this instruction, always ask your healthcare professional. [place] [address] any warranty or liability for your use of this information. We hope that we have addressed all of your medical concerns. The examination and treatment you received in the Emergency Department were for an emergent problem and were not intended as complete care. It is important that you follow up with your healthcare provider(s) for ongoing care. If your symptoms worsen or do not improve as expected, and you are unable to reach your usual health care provider(s), you should return to the Emergency Department. Today's healthcare is undergoing tremendous change, and patient satisfaction surveys are one of the many tools to assess the quality of medical care.   You may receive a survey from the [employer] regarding your experience in the Emergency Department. I hope that your experience has been completely positive, particularly the medical care that I provided. As such, please participate in the survey; anything less than excellent does not meet my expectations or intentions. [facility] and [facility] participate in nationally recognized quality of care measures. If your blood pressure is greater than 120/80, as reported below, we urge that you seek medical care to address the potential of high blood pressure, commonly known as hypertension. Hypertension can be hereditary or can be caused by certain medical conditions, pain, stress, or \"white coat syndrome. \"       Please make an appointment with your health care provider(s) for follow up of your Emergency Department visit. VITALS:   Patient Vitals for the past 8 hrs:   Temp Pulse Resp BP SpO2   01/10/17 1530 98.1 °F (36.7 °C) 61 10 113/60 100 %   01/10/17 1515 - (!) 58 17 112/50 100 %   01/10/17 1500 - 61 19 115/47 100 %   01/10/17 1445 - (!) 57 14 111/53 100 %   01/10/17 1430 - (!) 59 14 110/53 100 %   01/10/17 1400 - 63 13 120/56 100 %   01/10/17 1345 - 60 17 - 100 %   01/10/17 1331 98.2 °F (36.8 °C) 72 16 126/67 95 %          Thank you for allowing us to provide you with medical care today. We realize that you have many choices for your emergency care needs. Please choose us in the future for any continued health care needs. [facility] [employer], [address] [place] [address] 20.   Office: [phone]            Recent Results (from the past 24 hour(s))   EKG, 12 LEAD, INITIAL    Collection Time: 01/10/17  1:37 PM   Result Value Ref Range    Ventricular Rate 61 BPM    Atrial Rate 60 BPM    QRS Duration 76 ms    Q-T Interval 428 ms    QTC Calculation (Bezet) 430 ms    Calculated R Axis -5 degrees    Calculated T Axis 125 degrees    Diagnosis       Atrial fibrillation  Inferior infarct (cited on or before 21-AUG-2016)  When compared with ECG of 21-AUG-2016 14:17,  Nonspecific T wave abnormality, worse in Inferior leads  Nonspecific T wave abnormality, improved in Anterior leads  Confirmed by Thai Muro M.D., Oliva Brito ([id number]) on 1/10/2017 4:23:52 PM     CBC WITH AUTOMATED DIFF    Collection Time: 01/10/17  1:42 PM   Result Value Ref Range    WBC 3.2 (L) 3.6 - 11.0 K/uL    RBC 3.44 (L) 3.80 - 5.20 M/uL    HGB 9.0 (L) 11.5 - 16.0 g/dL    HCT 27.6 (L) 35.0 - 47.0 %    MCV 80.2 80.0 - 99.0 FL    MCH 26.2 26.0 - 34.0 PG    MCHC 32.6 30.0 - 36.5 g/dL    RDW 14.6 (H) 11.5 - 14.5 %    PLATELET 059 031 - 726 K/uL    NEUTROPHILS 44 32 - 75 %    LYMPHOCYTES 45 12 - 49 %    MONOCYTES 10 5 - 13 %    EOSINOPHILS 1 0 - 7 %    BASOPHILS 0 0 - 1 %    ABS. NEUTROPHILS 1.4 (L) 1.8 - 8.0 K/UL    ABS. LYMPHOCYTES 1.5 0.8 - 3.5 K/UL    ABS. MONOCYTES 0.3 0.0 - 1.0 K/UL    ABS. EOSINOPHILS 0.0 0.0 - 0.4 K/UL    ABS. BASOPHILS 0.0 0.0 - 0.1 K/UL   METABOLIC PANEL, COMPREHENSIVE    Collection Time: 01/10/17  1:42 PM   Result Value Ref Range    Sodium 141 136 - 145 mmol/L    Potassium 4.1 3.5 - 5.1 mmol/L    Chloride 106 97 - 108 mmol/L    CO2 25 21 - 32 mmol/L    Anion gap 10 5 - 15 mmol/L    Glucose 110 (H) 65 - 100 mg/dL    BUN 51 (H) 6 - 20 MG/DL    Creatinine 2.66 (H) 0.55 - 1.02 MG/DL    BUN/Creatinine ratio 19 12 - 20      GFR est AA 20 (L) >60 ml/min/1.73m2    GFR est non-AA 16 (L) >60 ml/min/1.73m2    Calcium 8.2 (L) 8.5 - 10.1 MG/DL    Bilirubin, total 0.5 0.2 - 1.0 MG/DL    ALT 6 (L) 12 - 78 U/L    AST 8 (L) 15 - 37 U/L    Alk.  phosphatase 99 45 - 117 U/L    Protein, total 8.7 (H) 6.4 - 8.2 g/dL    Albumin 2.2 (L) 3.5 - 5.0 g/dL    Globulin 6.5 (H) 2.0 - 4.0 g/dL    A-G Ratio 0.3 (L) 1.1 - 2.2     MAGNESIUM    Collection Time: 01/10/17  1:42 PM   Result Value Ref Range    Magnesium 2.0 1.6 - 2.4 mg/dL   URINALYSIS W/ RFLX MICROSCOPIC    Collection Time: 01/10/17  3:26 PM   Result Value Ref Range    Color YELLOW/STRAW      Appearance CLOUDY (A) CLEAR      Specific gravity 1.012 1.003 - 1.030      pH (UA) 5.0 5.0 - 8.0      Protein NEGATIVE  NEG mg/dL    Glucose NEGATIVE  NEG mg/dL    Ketone NEGATIVE  NEG mg/dL    Bilirubin NEGATIVE  NEG      Blood NEGATIVE  NEG      Urobilinogen 1.0 0.2 - 1.0 EU/dL    Nitrites NEGATIVE  NEG      Leukocyte Esterase MODERATE (A) NEG      WBC 20-50 0 - 4 /hpf    RBC 5-10 0 - 5 /hpf    Epithelial cells MODERATE (A) FEW /lpf    Bacteria 2+ (A) NEG /hpf    Amorphous Crystals 1+ (A) NEG    CA Oxalate Crystals FEW (A) NEG      Hyaline Cast 10-20 0 - 5 /lpf    Granular Cast 2-5 (A) NEG /lpf       Xr Hand Rt Min 3 V    Result Date: 1/10/2017  EXAM:  XR HAND RT MIN 3 V INDICATION:  Generalized weakness and loss of strength in right hand. COMPARISON: None. FINDINGS: Three views of the right hand demonstrate no acute fracture or dislocation. There is diffuse joint space narrowing with osteophytes and periarticular erosions. There is diffuse osteopenia. The soft tissues are within normal limits. IMPRESSION:  No acute abnormality. 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 Ct Head Wo Cont    Result Date: 1/10/2017  EXAM:  CT HEAD WO CONT INDICATION: Weakness. COMPARISON: CT head 8/21/2016 TECHNIQUE: Axial noncontrast head CT from foramen magnum to vertex. Coronal and sagittal reformatted images were obtained. CT dose reduction was achieved through use of a standardized protocol tailored for this examination and automatic exposure control for dose modulation. Adaptive statistical iterative reconstruction (ASIR) was utilized. FINDINGS:  There is diffuse age-related parenchymal volume loss. The ventricles and sulci are age-appropriate without hydrocephalus. There is no mass effect or midline shift. There is no intracranial hemorrhage or extra-axial fluid collection. Areas of low attenuation in the periventricular white matter represent stable chronic microvascular ischemic changes.  There is no new abnormal parenchymal attenuation. The basal cisterns are patent. There is intracranial atherosclerosis. The osseous structures are intact. The visualized paranasal sinuses and left mastoid air cells are clear. There is nonspecific scattered opacification in the right mastoid air cells. IMPRESSION: 1. There is no acute intracranial abnormality. 2. Stable nonspecific right mastoid air cell opacification.

## 2017-01-10 NOTE — ED NOTES
Transport at bedside. Report called to [facility]. Patient given discharge instructions. No questions or concerns at this time. Patient's VSS and in no acute distress.

## 2017-01-10 NOTE — ED TRIAGE NOTES
Patient arrives via EMS from [facility] for generalized weakness. Facility states that patient was more lethargic this morning and had decreased appetite. Patient was able to ambulate to the EMS stretcher. Arrives alert and oriented x3. Patient's only complaint is her hands are \"tired\". Denies chest pain or shortness of breath.

## 2017-04-28 PROBLEM — R41.82 ALTERED MENTAL STATUS: Status: ACTIVE | Noted: 2017-01-01

## 2017-04-28 PROBLEM — R20.9 CVA, OLD, ALTERATIONS OF SENSATIONS: Status: ACTIVE | Noted: 2017-01-01

## 2017-04-28 PROBLEM — I69.398 CVA, OLD, ALTERATIONS OF SENSATIONS: Status: ACTIVE | Noted: 2017-01-01

## 2017-04-28 NOTE — ED NOTES
TRANSFER - OUT REPORT:    Verbal report given to Kiera(name) on [place] [facility]  being transferred to [facility](unit) for routine progression of care       Report consisted of patients Situation, Background, Assessment and   Recommendations(SBAR). Information from the following report(s) SBAR, Kardex, ED Summary, [facility] and Recent Results was reviewed with the receiving nurse. Lines:   Peripheral IV 04/28/17 Right Wrist (Active)   Site Assessment Clean, dry, & intact 4/28/2017 10:46 AM   Phlebitis Assessment 0 4/28/2017 10:46 AM   Infiltration Assessment 0 4/28/2017 10:46 AM   Dressing Status Clean, dry, & intact 4/28/2017 10:46 AM   Hub Color/Line Status Green 4/28/2017 10:46 AM        Opportunity for questions and clarification was provided.       Patient transported with:   Olivia Perez RN

## 2017-04-28 NOTE — PROGRESS NOTES
Speech Pathology bedside swallow evaluation/discharge  Patient: Denise Meneses (89 y.o. female)  Date: 4/28/2017  Primary Diagnosis: Altered mental status        Precautions:        ASSESSMENT :  Based on the objective data described below, the patient presents with functional oropharyngeal swallow for advanced age. Patient with minimal dentition and partials not present in the hospital. Patient demonstrated timely and complete mastication of softened jennifer cracker. Both patient and family requesting softer solids until partials are brought to hospital. Pharyngeal swallow initiation and hyolaryngeal elevation/excursion WFL upon palpation. No overt s/s aspiration with successive straw sips of thin, puree or solid. Patient with mild dysarthria noted in conversation. Per family, this has significantly improved since arrival to ED. Likely this will continue to improve. Skilled therapy provided by a speech-language pathologist is not indicated at this time. PLAN :  Recommendations:  Soft solids/ thin liquids. Straws ok  meds as tolerated  Ok to advance to regular solids once partials are brought in by family  slp to f/u next week to determine if further evaluation is indicated (motor speech vs language vs integrated language)  Discharge Recommendations: None     SUBJECTIVE:   Patient stated I can swallow.     OBJECTIVE:     Past Medical History:   Diagnosis Date    Angina, class II ([address] 3/5/2013    ARF (acute renal failure) (HCC) 12/19/2010    Atrial fibrillation (HCC) 7/28/2014    Bleeding per rectum 11/6/2009    Coronary atherosclerosis of native coronary artery 5/7/2011    DJD (degenerative joint disease)     DNR (do not resuscitate) 11/29/2011    Gout 11/6/2009    High cholesterol 11/6/2009    Hypertension     IHD (ischemic heart disease) 11/6/2009    Kidney disease, chronic, stage III (moderate, EGFR 30-59 ml/min) 11/6/2009    Kidney disease, chronic, stage III (moderate, EGFR 30-59 ml/min) 11/6/2009    Obesity     TIA (transient ischemic attack) 1/14/2011     Past Surgical History:   Procedure Laterality Date    COLONOSCOPY  4/11/2005     Dr. Nic Cordero HX CHOLECYSTECTOMY  11/94    HX KASSANDRA [address]    [facility]     Prior Level of Function/Home Situation:  Home Situation  Home Environment: [address] [place] [address] Name: Sung  [place] [place] Residence: One story  Living Alone: No  Support Systems: Family member(s)  Patient Expects to be Discharged to[de-identified] Skilled nursing facility  Current DME Used/Available at Home: [address] [place] prior to admission: regular/thin liquids   Current Diet:  npo   Cognitive and Communication Status:  Neurologic State: Alert  Orientation Level: Oriented to person, Oriented to place, Oriented to time, Disoriented to situation (knows month, not year)  Cognition: Follows commands  Perception: Appears intact  Perseveration: No perseveration noted  Safety/Judgement: Awareness of environment  Oral Assessment:  Oral Assessment  Labial: Left droop  Dentition: Natural;Limited (partials not present)  Oral Hygiene:  (clean, moist)  Lingual: Decreased rate;Left deviation  Velum: Unable to visualize  Mandible: No impairment  P.O. Trials:  Patient Position:  (upright in bed)  Vocal quality prior to P.O.: No impairment  Consistency Presented: Thin liquid; Solid;Puree; Ice chips  How Presented: Successive swallows;Straw;SLP-fed/presented     Bolus Acceptance: No impairment  Bolus Formation/Control: Impaired  Type of Impairment: Delayed;Mastication  Propulsion: No impairment  Oral Residue: None  Initiation of Swallow: No impairment  Laryngeal Elevation: Functional  Aspiration Signs/Symptoms: None  Pharyngeal Phase Characteristics: No impairment, issues, or problems   Effective Modifications: None          Oral Phase Severity: No impairment  Pharyngeal Phase Severity : No impairment  NOMS:   The NOMS functional outcome measure was used to quantify this patient's level of swallowing impairment. Based on the NOMS, the patient was determined to be at level 7 for swallow function     G Codes: In compliance with CMSs Claims Based Outcome Reporting, the following G-code set was chosen for this patient based the use of the NOMS functional outcome to quantify this patient's level of swallowing impairment. Using the NOMS, the patient was determined to be at level 7 for swallow function which correlates with the CH= 0% level of severity. Based on the objective assessment provided within this note, the current, goal, and discharge g-codes are as follows:    Swallow  Swallowing:   Swallow Current Status CH= 0%   Swallow Goal Status CH= 0%   Swallow D/C Status CH= 0%      NOMS Swallowing Levels:  Level 1 (CN): NPO  Level 2 (CM): NPO but takes consistency in therapy  Level 3 (CL): Takes less than 50% of nutrition p.o. and continues with nonoral feedings; and/or safe with mod cues; and/or max diet restriction  Level 4 (CK): Safe swallow but needs mod cues; and/or mod diet restriction; and/or still requires some nonoral feeding/supplements  Level 5 (CJ): Safe swallow with min diet restriction; and/or needs min cues  Level 6 (CI): Independent with p.o.; rare cues; usually self cues; may need to avoid some foods or needs extra time  Level 7 ([address] [place] [address]): Independent for all p.o.  MINNIE. (2003). National Outcomes Measurement System (NOMS): Adult Speech-Language Pathology User's Guide.        Pain:  Pain Scale 1: Numeric (0 - 10)  Pain Intensity 1: 0     After treatment:   [] Patient left in no apparent distress sitting up in chair  [x] Patient left in no apparent distress in bed  [x] Call bell left within reach  [x] Nursing notified  [x] Caregiver present  [] Bed alarm activated    COMMUNICATION/EDUCATION:   The patients plan of care including findings, recommendations, and recommended diet changes were discussed with: Registered Nurse. Patient was educated regarding reasoning for swallow evaluation as patient brought in for possible stroke like symptoms. No dysphagia noted. [] Posted safety precautions in patient's room. [x] Patient/family have participated as able and agree with findings and recommendations. [] Patient is unable to participate in plan of care at this time.     Thank you for this referral.  Jimenez Kim M.S. CCC-SLP  Time Calculation: 12 mins

## 2017-04-28 NOTE — PROCEDURES
[facility]  *** FINAL REPORT ***    Name: Andrés Ford  MRN: [id number]    Inpatient  : 15 Aug 1911  HIS Order #: [id number]  [address] [facility] Visit #: [id number]  Date: 2017    TYPE OF TEST: Cerebrovascular Duplex    REASON FOR TEST  Cerebrovascular accident, L sided weakness    Right Carotid:-             Proximal               Mid                 Distal  cm/s  Systolic  Diastolic  Systolic  Diastolic  Systolic  Diastolic  CCA:     25.8       9.0                            64.0      13.0  Bulb:  ECA:     94.0  ICA:     69.0      16.0                            73.0      18.0  ICA/CCA:  1.1       1.2    ICA Stenosis:    Right Vertebral:-  Finding: Antegrade  Sys:       45.0  Christiana:    Right Subclavian:    Left Carotid:-            Proximal                Mid                 Distal  cm/s  Systolic  Diastolic  Systolic  Diastolic  Systolic  Diastolic  CCA:     94.5       8.0                            31.0       9.0  Bulb:  ECA:     90.0  ICA:    398.0      76.0                            41.0      14.0  ICA/CCA: 12.8       8.4    ICA Stenosis:    Left Vertebral:-  Finding: Antegrade  Sys:       82.0  Christiana:    Left Subclavian:    INTERPRETATION/FINDINGS  PROCEDURE:  Color duplex ultrasound imaging of extracranial  cerebrovascular arteries. FINDINGS:       Right:  Internal carotid velocity is within normal limits. There   is narrowing of the flow channel on color Doppler imaging and  calcific plaque on B-mode imaging, consistent with less than 50  percent stenosis. The common and external carotid arteries are patent   and without evidence of hemodynamically significant stenosis. The  internal carotid is tortous distally. Left:  Internal carotid velocity is elevated to a level  consistent with a 70 percent or greater stenosis; there is narrowing  of the flow channel on color Doppler imaging and calcific plaque is  visualized on B-mode imaging.   The common and external carotid  arteries are patent and without evidence of hemodynamically  significant stenosis. The internal carotid is tortous distally. IMPRESSION:  Consistent with less than 50% stenosis of the right  internal carotid and greater than 70% stenosis (likely greater than  80%) of the left internal carotid. Vertebrals are patent with  antegrade flow. ADDITIONAL COMMENTS    I have personally reviewed the data relevant to the interpretation of  this  study.     TECHNOLOGIST: Maisha Guidry RVT  Signed: 04/28/2017 05:06 PM    PHYSICIAN: Claudine May MD  Signed: 05/01/2017 06:14 AM

## 2017-04-28 NOTE — CONSULTS
[address]   [address] [facility], [address]   [address] [facility]       Name:  Ceferino Foster   MR#:  [id number]   :  08/15/1911   Account #:  [de-identified]    Date of Consultation:  2017   Date of Adm:  2017       REQUESTING PHYSICIAN: Dr. Emi Arnett EVALUATION: Slurred speech and left-sided   weakness. HISTORY OF PRESENT ILLNESS: The patient is a 8-year-old   female with past medical history of atrial fibrillation, hypertension,   obesity, high cholesterol, chronic kidney disease, coronary artery   disease, who presented from [facility] as the patient   developed slurring of speech, left-sided facial droop and left-sided   weakness. She normally speaks clearly, and does not have any   significant dementia. She is able to walk with a walker and is   independent with most activities of daily living. She was sent in to the   emergency room and her symptoms were improving as she was rolled   in. She was admitted for further workup. She is currently taking aspirin,   Plavix and a cholesterol medication. Denies any headache, changes in   vision, swallowing ability or chest pain, shortness of breath or   palpitations. PAST MEDICAL HISTORY: As mentioned above. ALLERGIES: NONE. HOME MEDICATIONS    1. Colchicine. 2. Potassium. 3. Pravachol. 4. Tramadol. 5. Clopidogrel. 6. Aspirin. 7. Magnesium oxide. FAMILY HISTORY: Noncontributory. SOCIAL HISTORY: No history of smoking or alcohol use. She is living   in assisted living. PHYSICAL EXAMINATION   GENERAL: On examination, the patient is alert. She is lying in bed in   no acute distress. VITAL SIGNS: Blood pressure 141/64, temperature 98.4, pulse is 79. NEUROLOGIC: Her speech is mildly slurred. She is able to follow   commands. She was able to recognize family members present in the   room. Follows commands. Pupils are equal and reactive. Face   appears symmetric.  Facial sensation is intact. Muscle tone and bulk is   normal. No significant drift is noted on the left side. She is able to move   both lower extremities. Deep tendon reflexes are 2/2 and symmetric. Toes are downgoing. Sensation is intact. Gait exam was deferred. HEART: Regular rate. CHEST: Clear. ABDOMEN: Soft, nontender. EXTREMITIES: No edema. LABORATORY DATA: CBC with WBC 3.5, hemoglobin 8.0, hematocrit   24.9, platelets 116. CHEMISTRIES: Sodium 141, potassium 3.8, BUN 28, creatinine 1.84. CT brain showed age-appropriate changes. ASSESSMENT AND PLAN: A 8-year-old female with history of atrial   fibrillation, hypertension, dyslipidemia, who was admitted with sudden   onset of slurred speech, left facial droop and left-sided weakness. Most   of her symptoms have already resolved, but she still has some slurred   speech. This could be a transient ischemic attack or acute infarction. MRI is pending for further diagnostic clarification. She already takes   aspirin, Plavix and a cholesterol lowering medications/statin which we   will continue. Continue with supportive care and speech therapy to   evaluate him. If the imaging is unremarkable, we can initiate discharge   planning. Thank you for this consultation.         Dulce Poole MD      AS / RADHA   D:  04/28/2017   16:12   T:  04/28/2017   16:38   Job #:  [id number]

## 2017-04-28 NOTE — IP AVS SNAPSHOT
Current Discharge Medication List  
  
CONTINUE these medications which have NOT CHANGED Dose & Instructions Dispensing Information Comments Morning Noon Evening Bedtime  
 aspirin delayed-release 81 mg tablet Your last dose was: Your next dose is:    
   
   
 Dose:  81 mg Take 81 mg by mouth daily. Refills:  0  
     
   
   
   
  
 carvedilol 12.5 mg tablet Commonly known as:  Layman Marin Your last dose was: Your next dose is: TAKE 1 AND 1/2 TABLETS BY MOUTH TWICE DAILY WITH MEALS Quantity:  90 Tab Refills:  5  
     
   
   
   
  
 clopidogrel 75 mg Tab Commonly known as:  PLAVIX Your last dose was: Your next dose is: TAKE 1 TABLET BY MOUTH DAILY. Quantity:  30 Tab Refills:  11  
     
   
   
   
  
 colchicine 0.6 mg tablet Your last dose was: Your next dose is:    
   
   
 Dose:  0.6 mg Take 0.6 mg by mouth daily as needed (gout). Refills:  0  
     
   
   
   
  
 isosorbide mononitrate ER 30 mg tablet Commonly known as:  IMDUR Your last dose was: Your next dose is:    
   
   
 Dose:  30 mg Take 30 mg by mouth daily. Refills:  0  
     
   
   
   
  
 LASIX 20 mg tablet Generic drug:  furosemide Your last dose was: Your next dose is:    
   
   
 Dose:  20 mg Take 20 mg by mouth two (2) times a day. Refills:  0  
     
   
   
   
  
 loperamide 2 mg capsule Commonly known as:  IMODIUM Your last dose was: Your next dose is:    
   
   
 Dose:  2 mg Take 2 mg by mouth every six (6) hours as needed for Diarrhea. Refills:  0  
     
   
   
   
  
 magnesium chloride 64 mg tablet Commonly known as:  MAG DELAY Your last dose was: Your next dose is:    
   
   
 Dose:  64 mg Take 64 mg by mouth daily. Refills:  0 POTASSIUM CHLORIDE SR 10 MEQ TAB Your last dose was: Your next dose is:    
   
   
 Dose:  1 Cap Take 1 Cap by mouth two (2) times a day. Refills:  0  
     
   
   
   
  
 pravastatin 20 mg tablet Commonly known as:  PRAVACHOL Your last dose was: Your next dose is:    
   
   
 Dose:  20 mg Take 20 mg by mouth nightly. Indications: hypercholesterolemia Refills:  0  
     
   
   
   
  
 traMADol 50 mg tablet Commonly known as:  ULTRAM  
   
Your last dose was: Your next dose is:    
   
   
 Dose:  50 mg Take 50 mg by mouth every eight (8) hours as needed for Pain. Refills:  0  
     
   
   
   
  
 VITAMIN D3 1,000 unit tablet Generic drug:  cholecalciferol Your last dose was: Your next dose is:    
   
   
 Dose:  1000 Units Take 1,000 Units by mouth daily. Refills:  0 STOP taking these medications   
 amLODIPine-benazepril 10-20 mg per capsule Commonly known as:  Geremias Javier

## 2017-04-28 NOTE — PROGRESS NOTES
Spiritual Care Assessment/Progress Notes    Lurene Ganser [id number]  [id number]    8/15/1911  80 y.o.  female    Patient Telephone Number: [phone] (home)   Nondenominational Affiliation: [facility]   Language: English   Extended Emergency Contact Information  Primary Emergency Contact: [address] Phone: [phone]  Work Phone: [phone]  Mobile Phone: [phone]  Relation: Other Relative   Patient Active Problem List    Diagnosis Date Noted    Diuretic-induced hypokalemia 04/20/2015    Essential hypertension 04/15/2015    Atrial fibrillation ([facility] 07/28/2014    Angina, class II ([facility] 03/05/2013    Obesity 02/25/2013    Advanced age 02/29/2012    DNR (do not resuscitate) 11/29/2011    Coronary atherosclerosis of native coronary artery 05/07/2011    Carpal tunnel syndrome 03/28/2011    TIA (transient ischemic attack) 01/14/2011    Anxiety reaction 07/29/2010    Right lumbar radiculopathy 05/25/2010    DJD (degenerative joint disease), multiple sites 03/31/2010    Insomnia 03/31/2010    Gout 11/06/2009    High cholesterol 11/06/2009    Kidney disease, chronic, stage III (moderate, EGFR 30-59 ml/min) 11/06/2009        Date: 4/28/2017       Level of Nondenominational/Spiritual Activity:  []         Involved in patrick tradition/spiritual practice    []         Not involved in patrick tradition/spiritual practice  [x]         Spiritually oriented    []         Claims no spiritual orientation    []         seeking spiritual identity  []         Feels alienated from Jainism practice/tradition  []         Feels angry about Jainism practice/tradition  []         Spirituality/Jainism tradition  a resource for coping at this time.   []         Not able to assess due to medical condition    Services Provided Today:  []         crisis intervention    []         reading Scriptures  [x]         spiritual assessment    []         prayer  [x]         empathic listening/emotional support  []         rites and rituals (cite in comments)  []         life review     []         Synagogue support  []         theological development   []         advocacy  []         ethical dialog     []         blessing  []         bereavement support    []         support to family  []         anticipatory grief support   []         help with AMD  []         spiritual guidance    []         meditation      Spiritual Care Needs  []         Emotional Support  []         Spiritual/Uatsdin Care  []         Loss/Adjustment  []         Advocacy/Referral                /Ethics  []         No needs expressed at               this time  [x]         Other: (note in               comments)  Spiritual Care Plan  []         Follow up visits with               pt/family  []         Provide materials  []         Schedule sacraments  []         Contact Community               Clergy  [x]         Follow up as needed  []         Other: (note in               comments)     Comments: Responded to Code S in the ER. Miss Qureshi's granddaughter was present. She stepped out of the room briefly. Miss Erich Douglas responded with a very clear yes for belief in God. She exclaimed an Hallelujah. Provided gentle spritiual presence and assurance eof prayer. Her granddaughter came back tot the room and asked for some time with her grandmother.  Advised of  Availability   Visited by: Lakisha Campbell [address] [facility] Kami ([id number])

## 2017-04-28 NOTE — ED PROVIDER NOTES
HPI Comments: 80 y.o. female with past medical history significant for afib, TIA, HTN, obesity, high cholesterol, CKD (stage III), angina (class II), and CAD who presents from [facility] (assisted living) via EMS for evaluation of AMS. Earlier this morning, patient developed abrupt onset of markedly slurred speech, left facial droop, and left-sided weakness. Per nursing home staff, patient was last known well around 09:30 this morning. Per nursing home staff, patient is far from her baseline, as she normally speaks \"very intelligently\" and ambulates with a walker. Per EMS, patient's symptoms began to resolve upon their assessment. Patient is currently on Plavix. Per EMS, BG was measured at 126. There are no other acute medical concerns at this time. Social hx: no h/o tobacco smoking; +DNR  PCP: Shy Diaz MD    Full history, physical exam, and ROS unable to be obtained due to:  AMS. Note written by Vinnie Johnson, as dictated by Steve Arzate MD 10:37 AM    The history is provided by the EMS personnel and the nursing home.         Past Medical History:   Diagnosis Date    Angina, class II ([address] 3/5/2013    ARF (acute renal failure) ([address] 12/19/2010    Atrial fibrillation ([address] 7/28/2014    Bleeding per rectum 11/6/2009    Coronary atherosclerosis of native coronary artery 5/7/2011    DJD (degenerative joint disease)     DNR (do not resuscitate) 11/29/2011    Gout 11/6/2009    High cholesterol 11/6/2009    Hypertension     IHD (ischemic heart disease) 11/6/2009    Kidney disease, chronic, stage III (moderate, EGFR 30-59 ml/min) 11/6/2009    Kidney disease, chronic, stage III (moderate, EGFR 30-59 ml/min) 11/6/2009    Obesity     TIA (transient ischemic attack) 1/14/2011       Past Surgical History:   Procedure Laterality Date    COLONOSCOPY  4/11/2005     Dr. Lisa Lock HX CHOLECYSTECTOMY  11/94    HX [address]    [facility]         Family History:   Problem Relation Age of Onset    Stroke Other     Hypertension Other     Heart Disease Other        Social History     Social History    Marital status:      Spouse name: N/A    Number of children: N/A    Years of education: N/A     Occupational History    Not on file. Social History Main Topics    Smoking status: Never Smoker    Smokeless tobacco: Never Used    Alcohol use No    Drug use: No    Sexual activity: No     Other Topics Concern    Not on file     Social History Narrative         ALLERGIES: Review of patient's allergies indicates no known allergies. Review of Systems   Unable to perform ROS: Mental status change       Vitals:    04/28/17 1044   BP: 131/53   Pulse: 62   Resp: 16   Temp: 98.3 °F (36.8 °C)   SpO2: 100%   Weight: 70.3 kg (155 lb)   Height: 5' 3\" (1.6 m)            Physical Exam     Nursing note and vitals reviewed. Constitutional: appears well-developed and well-nourished. No distress. HENT:   Head: Normocephalic and atraumatic. Sclera anicteric  Nose: No rhinorrhea  Mouth/Throat: Oropharynx is clear and moist. Pharynx normal  Eyes: Conjunctivae are normal. Pupils are equal, round, and reactive to light. Right eye exhibits no discharge. Left eye exhibits no discharge. No scleral icterus. Neck: Painless normal range of motion. Supple  Cardiovascular: Normal rate, regular rhythm, normal heart sounds and intact distal pulses. Exam reveals no gallop and no friction rub. No murmur heard. Pulmonary/Chest: Effort normal and breath sounds normal. No respiratory distress. no wheezes. no rales. Abdominal: Soft. Bowel sounds are normal. Exhibits no distension and no mass. No tenderness. No guarding. Musculoskeletal: Normal range of motion. no tenderness. No edema  Lymphadenopathy:   No cervical adenopathy. Neurological:  Alert and oriented to person, place, and time.  Coordination normal. CN 2-12 intact EXCEPT UNABLE TO UNDERSTAND ANY WORDS AND LEFT FACIAL DROOP. SLIGHT LEFT  WEAKNESS. NO ARM DRIFT OR LEG DRIFT. Moving all extremities. Skin: Skin is warm and dry. No rash noted. No pallor. MDM  Number of Diagnoses or Management Options  Critical Care  Total time providing critical care: 30-74 minutes    Total critical care time spend exclusive of procedures:  35 minutes. ED Course   8-year-old female on Plavix with history of TIA and other medical problems here with dysarthria, left facial droop. Slight  weakness on the left. Differential diagnosis includes CVA, TIA, intracranial hemorrhage and others. We'll check labs and continue CODE S. Procedures      CONSULT NOTE:  10:52 AM Vitor Trejo MD spoke with Dr. Trena Johnson, Consult for Teleneurology. Discussed available diagnostic tests and clinical findings. Patient is showing clear signs of improvement and able to be understood. Will hold off on tPA for now. Granddaughter, who is the patient's POA, agrees to this plan. Dr. Trena Johnson is also agreeable to this plan. 11:30 AM  D/w hospitalist who has admitted the patient. Vitor Trejo MD    Recent Results (from the past 24 hour(s))   POC INR    Collection Time: 04/28/17 10:31 AM   Result Value Ref Range    INR (POC) 1.2 (H) <1.2     CBC WITH AUTOMATED DIFF    Collection Time: 04/28/17 10:43 AM   Result Value Ref Range    WBC 3.5 (L) 3.6 - 11.0 K/uL    RBC 3.07 (L) 3.80 - 5.20 M/uL    HGB 8.0 (L) 11.5 - 16.0 g/dL    HCT 24.9 (L) 35.0 - 47.0 %    MCV 81.1 80.0 - 99.0 FL    MCH 26.1 26.0 - 34.0 PG    MCHC 32.1 30.0 - 36.5 g/dL    RDW 15.0 (H) 11.5 - 14.5 %    PLATELET 040 189 - 338 K/uL    NEUTROPHILS 43 32 - 75 %    LYMPHOCYTES 41 12 - 49 %    MONOCYTES 14 (H) 5 - 13 %    EOSINOPHILS 2 0 - 7 %    BASOPHILS 0 0 - 1 %    ABS. NEUTROPHILS 1.5 (L) 1.8 - 8.0 K/UL    ABS. LYMPHOCYTES 1.4 0.8 - 3.5 K/UL    ABS. MONOCYTES 0.5 0.0 - 1.0 K/UL    ABS.  EOSINOPHILS 0.1 0.0 - 0.4 K/UL    ABS. BASOPHILS 0.0 0.0 - 0.1 K/UL   PROTHROMBIN TIME + INR    Collection Time: 04/28/17 10:43 AM   Result Value Ref Range    INR 1.2 (H) 0.9 - 1.1      Prothrombin time 12.3 (H) 9.0 - 67.1 sec   METABOLIC PANEL, COMPREHENSIVE    Collection Time: 04/28/17 10:43 AM   Result Value Ref Range    Sodium 141 136 - 145 mmol/L    Potassium 3.8 3.5 - 5.1 mmol/L    Chloride 107 97 - 108 mmol/L    CO2 26 21 - 32 mmol/L    Anion gap 8 5 - 15 mmol/L    Glucose 112 (H) 65 - 100 mg/dL    BUN 28 (H) 6 - 20 MG/DL    Creatinine 1.84 (H) 0.55 - 1.02 MG/DL    BUN/Creatinine ratio 15 12 - 20      GFR est AA 30 (L) >60 ml/min/1.73m2    GFR est non-AA 25 (L) >60 ml/min/1.73m2    Calcium 8.0 (L) 8.5 - 10.1 MG/DL    Bilirubin, total 0.4 0.2 - 1.0 MG/DL    ALT (SGPT) 8 (L) 12 - 78 U/L    AST (SGOT) 8 (L) 15 - 37 U/L    Alk. phosphatase 83 45 - 117 U/L    Protein, total 8.4 (H) 6.4 - 8.2 g/dL    Albumin 2.2 (L) 3.5 - 5.0 g/dL    Globulin 6.2 (H) 2.0 - 4.0 g/dL    A-G Ratio 0.4 (L) 1.1 - 2.2     PTT    Collection Time: 04/28/17 10:43 AM   Result Value Ref Range    aPTT 25.6 22.1 - 32.5 sec    aPTT, therapeutic range     58.0 - 77.0 SECS       Ct Code Neuro Head Wo Contrast    Result Date: 4/28/2017  EXAM:  CT CODE NEURO HEAD WO CONTRAST INDICATION:  code s, acute onset left-sided weakness and change in speech. Last known well 0930 hours this morning. Left Facial droop. TECHNIQUE: Thin axial images were obtained through the calvarium and saved with standard and bone window algorithm. Coronal and sagittal reconstructions were generated. CT dose reduction was achieved through use of a standardized protocol tailored for this examination and automatic exposure control for dose modulation. COMPARISON: CT head 1/10/17 FINDINGS: Generalized prominence of ventricles and sulci consistent with cerebral volume loss.  2. Multifocal and confluent T2 hyperintensity in the cerebral white matter suggest chronic small vessel type ischemic change. The above age-related findings are somewhat greater than expected. No new areas of decreased attenuation in the cerebral cortex, basal ganglia or brainstem. Small left frontal parafalcine extradural mass measuring 9 x 5 mm consistent with incidental meningioma present since at least 2016. No evidence of intracranial hemorrhage, acute infarct, other mass mass or abnormal extra-axial fluid collections. Mild mucosal thickening in the sphenoid sinus. Structures of the skull base are otherwise unremarkable. IMPRESSION: 1. Chronic volume loss and white matter disease somewhat greater than expected and stable. 2. Incidental left frontal falx subcentimeter meningioma. 3. No acute intracranial abnormality or significant change.

## 2017-04-28 NOTE — IP AVS SNAPSHOT
[address] [place] [address] [facility] 
[phone] Patient: Nikko Larson MRN: [id number] :8/15/1911 You are allergic to the following No active allergies Recent Documentation Height Weight BMI OB Status Smoking Status 1.6 m 71.3 kg 27.84 kg/m2 Postmenopausal Never Smoker Emergency Contacts Name Discharge Info Relation Home Work Mobile Aylin Nichols DISCHARGE CAREGIVER [3] Other Relative [6] [phone] About your hospitalization You were admitted on:  2017 You last received care in the:  [facility] 6S NEURO-SCI TELE You were discharged on:  May 1, 2017 Unit phone number:  [phone] Why you were hospitalized Your primary diagnosis was:  Cva, Old, Alterations Of Sensations Your diagnoses also included: Altered Mental Status Providers Seen During Your Hospitalizations Provider Role Specialty Primary office phone Jaycob Brown MD Attending Provider Emergency Medicine [phone] Maribel Bonilla MD Attending Provider Internal Medicine [phone] Nii Boyle MD Attending Provider Internal Medicine [phone] Your Primary Care Physician (PCP) Primary Care Physician Office Phone Office Fax webtide [phone] Follow-up Information Follow up With Details Comments Contact Info Chanda Thompson MD In 2 weeks discharge follow up  2010 [facility] 
[phone] [address] for rehab [address] 
[phone] Current Discharge Medication List  
  
CONTINUE these medications which have NOT CHANGED Dose & Instructions Dispensing Information Comments Morning Noon Evening Bedtime  
 aspirin delayed-release 81 mg tablet Your last dose was: Your next dose is:    
   
   
 Dose:  81 mg Take 81 mg by mouth daily. Refills:  0  
     
   
   
   
  
 carvedilol 12.5 mg tablet Commonly known as:  Elvi Jorge Your last dose was: Your next dose is: TAKE 1 AND 1/2 TABLETS BY MOUTH TWICE DAILY WITH MEALS Quantity:  90 Tab Refills:  5  
     
   
   
   
  
 clopidogrel 75 mg Tab Commonly known as:  PLAVIX Your last dose was: Your next dose is: TAKE 1 TABLET BY MOUTH DAILY. Quantity:  30 Tab Refills:  11  
     
   
   
   
  
 colchicine 0.6 mg tablet Your last dose was: Your next dose is:    
   
   
 Dose:  0.6 mg Take 0.6 mg by mouth daily as needed (gout). Refills:  0  
     
   
   
   
  
 isosorbide mononitrate ER 30 mg tablet Commonly known as:  IMDUR Your last dose was: Your next dose is:    
   
   
 Dose:  30 mg Take 30 mg by mouth daily. Refills:  0  
     
   
   
   
  
 LASIX 20 mg tablet Generic drug:  furosemide Your last dose was: Your next dose is:    
   
   
 Dose:  20 mg Take 20 mg by mouth two (2) times a day. Refills:  0  
     
   
   
   
  
 loperamide 2 mg capsule Commonly known as:  IMODIUM Your last dose was: Your next dose is:    
   
   
 Dose:  2 mg Take 2 mg by mouth every six (6) hours as needed for Diarrhea. Refills:  0  
     
   
   
   
  
 magnesium chloride 64 mg tablet Commonly known as:  MAG DELAY Your last dose was: Your next dose is:    
   
   
 Dose:  64 mg Take 64 mg by mouth daily. Refills:  0 POTASSIUM CHLORIDE SR 10 MEQ TAB Your last dose was: Your next dose is:    
   
   
 Dose:  1 Cap Take 1 Cap by mouth two (2) times a day. Refills:  0  
     
   
   
   
  
 pravastatin 20 mg tablet Commonly known as:  PRAVACHOL Your last dose was: Your next dose is: Dose:  20 mg Take 20 mg by mouth nightly. Indications: hypercholesterolemia Refills:  0  
     
   
   
   
  
 traMADol 50 mg tablet Commonly known as:  ULTRAM  
   
Your last dose was: Your next dose is:    
   
   
 Dose:  50 mg Take 50 mg by mouth every eight (8) hours as needed for Pain. Refills:  0  
     
   
   
   
  
 VITAMIN D3 1,000 unit tablet Generic drug:  cholecalciferol Your last dose was: Your next dose is:    
   
   
 Dose:  1000 Units Take 1,000 Units by mouth daily. Refills:  0 STOP taking these medications   
 amLODIPine-benazepril 10-20 mg per capsule Commonly known as:  Jareth Franz Discharge Instructions Discharging provider: Alexey Grove MD 
 
Primary care provider: Juan London MD 
 
[address] COURSE: 
 
81 yo female with PMhx Afib, TIAs, HTN, CKD 3, CAD, admitted from [facility] for AMS and facial droop.  
  
1. Acute Left CVA 
- Neuro check 
- Neuro input - MRI brain : noted 
- on ASA/Plavix, 
- Carotid duplex: 70-80% Left ICA, not a candidate for intervention at this point 
- PT/OT : rec: SNF, d/w grand-daughter, may need to transfer patient to LTC.  
  
2. AFib - Coreg - ASA/Plavix 
  
3. CKD IV - table 
  
4. HTN  
- c/w Coreg and lasix - d/c Lisinopril/Amlodipine 5. CAD 
- c/w ASA/Plavix/BB/Statin FOLLOW-UP CARE RECOMMENDATIONS: 
 
APPOINTMENTS: 
Follow-up Information Follow up With Details Comments Contact Info Juan London MD In 2 weeks discharge follow up  [address] [place] [address] 
[phone] It is very important that you keep follow-up appointment(s). Bring discharge papers, medication list (and/or medication bottles) to follow-up appointments for review by outpatient provider(s). FOLLOW-UP TESTS RECOMMENDED:  
- none ONGOING TREATMENT PLAN: rehab PENDING TEST RESULTS: 
At the time of discharge the following test results are still pending: none. Please review these results as they become available. Specific symptoms to watch for: chest pain, shortness of breath, fever, chills, nausea, vomiting, diarrhea, change in mentation, falling, weakness, bleeding. DIET:  Cardiac Diet ACTIVITY:  Activity as tolerated WOUND CARE: none EQUIPMENT needed:  none INCIDENTAL FINDINGS:  none GOALS OF CARE: 
X  Eventual return to home/independent/assisted living Long term SNF Hospice No rehospitalization Patient condition at discharge:  
Functional status Poor X  Deconditioned Independent Cognition Murl Cornfield Forgetful (some sensescence) X  Dementia Catheters/lines (plus indication) Peterson PICC   
  PEG Code status Full code X  DNR Tangela Araya . . . . . . . . . . . . . . . . . . . . . . . . . . . . . . . . . . . . . . . . . . . . . . . . . . . . . . . . . . . . . . . . . . . . . . Tangela Araya CHRONIC MEDICAL CONDITIONS: 
Problem List as of 5/1/2017  Date Reviewed: 7/6/2016 Codes Class Noted - Resolved * (Principal)CVA, old, alterations of sensations ICD-10-CM: I69.398, R20.9 ICD-9-CM: 438.6  4/28/2017 - Present Altered mental status ICD-10-CM: R41.82 
ICD-9-CM: 780.97  4/28/2017 - Present Diuretic-induced hypokalemia ICD-10-CM: E87.6, T50.2X5A 
ICD-9-CM: 276.8, E944.4  4/20/2015 - Present Essential hypertension (Chronic) ICD-10-CM: I10 
ICD-9-CM: 401.9  4/15/2015 - Present Atrial fibrillation (HCC) (Chronic) ICD-10-CM: I48.91 
ICD-9-CM: 427.31  7/28/2014 - Present Angina, class II ([facility] [address] ICD-10-CM: I20.9 ICD-9-CM: 413.9  3/5/2013 - Present Overview Signed 3/5/2013  6:03 AM by Lucille Caputo MD  
  Stable for years. Obesity ICD-10-CM: E66.9 ICD-9-CM: 278.00  2/25/2013 - Present Advanced age ICD-9-CM: BFS3888  2/29/2012 - Present DNR (do not resuscitate) ICD-10-CM: D07 ICD-9-CM: V49.86  11/29/2011 - Present Coronary atherosclerosis of native coronary artery (Chronic) ICD-10-CM: I25.10 ICD-9-CM: 414.01  5/7/2011 - Present Carpal tunnel syndrome ICD-10-CM: G56.00 
ICD-9-CM: 354.0  3/28/2011 - Present  
   
 TIA (transient ischemic attack) ICD-10-CM: G45.9 ICD-9-CM: 435.9  1/14/2011 - Present Anxiety reaction ICD-10-CM: F41.1 ICD-9-CM: 300.00  7/29/2010 - Present Right lumbar radiculopathy ICD-10-CM: M54.16 
ICD-9-CM: 724.4  5/25/2010 - Present DJD (degenerative joint disease), multiple sites ICD-10-CM: M15.9 ICD-9-CM: 715.89  3/31/2010 - Present Insomnia ICD-10-CM: G47.00 ICD-9-CM: 780.52  3/31/2010 - Present Gout (Chronic) ICD-10-CM: M10.9 ICD-9-CM: 274.9  11/6/2009 - Present High cholesterol (Chronic) ICD-10-CM: E78.00 ICD-9-CM: 272.0  11/6/2009 - Present Kidney disease, chronic, stage III (moderate, EGFR 30-59 ml/min) ICD-10-CM: N18.3 ICD-9-CM: 585.3  11/6/2009 - Present RESOLVED: Dehydration ICD-10-CM: E86.0 ICD-9-CM: 276.51  5/6/2011 - 7/27/2011 RESOLVED: Nausea ICD-10-CM: R11.0 ICD-9-CM: 787.02  5/6/2011 - 10/22/2012 RESOLVED: Diarrhea ICD-10-CM: R19.7 ICD-9-CM: 787.91  5/6/2011 - 10/22/2012 RESOLVED: Nausea & vomiting ICD-10-CM: R11.2 ICD-9-CM: 787.01  12/19/2010 - 3/28/2011 RESOLVED: Diarrhea ICD-10-CM: R19.7 ICD-9-CM: 787.91  12/19/2010 - 3/28/2011 RESOLVED: ARF (acute renal failure) (HCC) ICD-10-CM: N17.9 ICD-9-CM: 584.9  12/19/2010 - 3/28/2011 RESOLVED: Dehydration ICD-10-CM: E86.0 ICD-9-CM: 276.51  12/19/2010 - 3/28/2011 RESOLVED: Bleeding per rectum ICD-10-CM: K62.5 ICD-9-CM: 569.3  11/6/2009 - 12/19/2010 Overview Signed 11/6/2009  9:52 AM by Gerson Hercules Secondary to hemorrhoids Discharge Orders None MyChart Announcement We are excited to announce that we are making your provider's discharge notes available to you in [employer]. You will see these notes when they are completed and signed by the physician that discharged you from your recent hospital stay. If you have any questions or concerns about any information you see in [employer], please call the Health Information Department where you were seen or reach out to your Primary Care Provider for more information about your plan of care. Introducing [facility]! Kim Humphreys introduces [employer] patient portal. Now you can access parts of your medical record, email your doctor's office, and request medication refills online. 1. In your internet browser, go to [email or web address] 2. Click on the First Time User? Click Here link in the Sign In box. You will see the New Member Sign Up page. 3. Enter your [employer] Access Code exactly as it appears below. You will not need to use this code after youve completed the sign-up process. If you do not sign up before the expiration date, you must request a new code. · [employer] Access Code: [email or web address] Expires: 7/27/2017 11:56 AM 
 
4. Enter the last four digits of your Social Security Number (xxxx) and Date of Birth (mm/dd/yyyy) as indicated and click Submit. You will be taken to the next sign-up page. 5. Create a [employer] ID. This will be your [employer] login ID and cannot be changed, so think of one that is secure and easy to remember. 6. Create a [employer] password. You can change your password at any time. 7. Enter your Password Reset Question and Answer. This can be used at a later time if you forget your password. 8. Enter your e-mail address. You will receive e-mail notification when new information is available in [address] 9. Click Sign Up. You can now view and download portions of your medical record.  
10. Click the Download Summary menu link to download a portable copy of your medical information. If you have questions, please visit the Frequently Asked Questions section of the [employer] website. Remember, MyChart is NOT to be used for urgent needs. For medical emergencies, dial 911. Now available from your iPhone and Android! General Information Please provide this summary of care documentation to your next provider. Patient Signature:  ____________________________________________________________ Date:  ____________________________________________________________  
  
Niya Batman Provider Signature:  ____________________________________________________________ Date:  ____________________________________________________________

## 2017-04-28 NOTE — H&P
[address]   [address], [address]   HISTORY AND PHYSICAL       Name:  Lauren Carlisle   MR#:  [id number]   :  08/15/1911   Account #:  [de-identified]        Date of Adm:  2017       CHIEF COMPLAINT: Altered mental status, slurred speech, and left-  sided weakness. HISTORY OF PRESENT ILLNESS: The patient is a 80-year-old    American female with past medical history of paroxysmal atrial   fibrillation, transient ischemic attack, hypertension, obesity,   dyslipidemia, chronic kidney disease stage III, history of coronary   artery disease. She was brought to the emergency department from   [facility] via EMS on account of altered mental   status. She was otherwise in her normal state of health until earlier this   morning after breakfast when she suddenly developed marked slurred   speech, left facial droop and left-sided weakness. She was last known   well at around 9:30 a.m. Based on these new findings, the nursing   home staff called 911 and she was subsequently brought here to the   emergency department for evaluation. Currently, at the patient's   bedside. She is a very poor historian and she is currently speaking   unintelligible words. She says that at some point that her knees hurt. Otherwise, most of her words were incomprehensible. Here in the   emergency department, a code stroke was called and she had a CT of   the head done, which showed chronic volume loss and white matter   disease, somewhat greater than right expected and stable. Incidental   left frontal 5 subcentimer meningioma. No acute intracranial   abnormality. Teleneurology was consulted. She was not a tPA   candidate. She was also found to be in atrial fibrillation. We were   subsequently consulted for hospital admission and for evaluation. RECENT HOSPITALIZATION: None. PAST MEDICAL HISTORY   1. Coronary artery disease. 2. Dyslipidemia.    3. Chronic kidney disease, stage III. 4. History of hypertension. 5. Paroxysmal atrial fibrillation. 6. Transient ischemic attack. PAST SURGICAL HISTORY: Unobtainable. HOME MEDICATIONS: Includes   1. Amlodipine/Benazepril 10/20 mg tablets p.o. daily. 2. Aspirin 81 mg p.o. daily. 3. Coreg 12.5 mg p.o. twice daily with meals. 4. Cholecalciferol 1000 units by mouth daily. 5. Clopidogrel 75 mg p.o. daily. 6. Colchicine 0.6 mg p.o. daily as needed for gout. 7. Lasix 20 mg p.o. twice a day. 8. Isosorbide mononitrate 30 mg p.o. daily. 9. Loperamide 2 mg by mouth every 6 hours as needed for diarrhea. 10. Magnesium chloride 64 mg p.o. daily. 11. Potassium chloride 10 mEq tablet 2 times a day. 12. Pravastatin 20 mg by mouth nightly. 13. Tramadol 50 mg every 8 hours as needed for pain. ALLERGIES: SHE HAS NO DOCUMENTED DRUG ALLERGIES. SOCIAL HISTORY: She lives with her daughter. She is . She   does not smoke cigarettes or use illicit drugs. She is a former smoker. The patient is currently a DO NOT RESUSCITATE. REVIEW OF SYSTEMS: Unable to obtain due to the patient factors. PHYSICAL EXAMINATION   TRIAGE VITAL SIGNS: Temperature of 98.3, pulse 62 beats per   minute, blood pressure 131/53 mmHg. Respiration is 16, oxygen   saturation is 100% on room air. GENERAL APPEARANCE: She was lying on the bed. She does not   appear to be in any obvious distress. She is well-developed, well-  nourished. She looks her stated age. HEENT: HEENT: Atraumatic, normocephalic. Pupils reactive to light. Oral mucosa is moist. She is edentulous, mainly in upper maxillary   area. NECK: Supple. No jugular venous distention. LUNGS: Clear to auscultation bilaterally. No wheeze or rales heard. CARDIOVASCULAR: S1 and S2 heard. Irregularly irregular rate. No   gallops or murmurs. ABDOMEN: Soft, nontender, nondistended. No hepatosplenomegaly. EXTREMITIES: No pitting pedal edema.  Dorsalis pedis pulses were palpable. NEUROLOGIC: The patient is awake and oriented to self, but not to   place or to person. Positive for dysarthria. Positive left pronator drift. Power in left upper extremity, about 1-2/5, in the right upper extremity   about 4/5. She also had a left hand  weakness. Power in the right   lower extremity is about 4-5/5, in the left lower extremity is about 3/5. LABORATORY EVALUATION: White blood cell count is 3.5,   hemoglobin is 8.0, platelets 534. Coagulation: INR is 1.2. Chemistry:   Sodium 141, potassium 3.8, chloride 107, bicarbonate 26, glucose   112, BUN 28, creatinine 1.84, ALT 8, AST 8, alkaline phosphatase 53. CT scan of the head as mentioned above. ASSESSMENT AND PLAN: The patient is a 80-year-old    American female with above past medical history who was brought to   the emergency department via EMS on account of slurred speech, left   facial droop and left-sided weakness. 1. Slurred speech/left-sided weakness. This is most likely due to   cerebrovascular accident. I suspect underlying cardioembolic stroke in   the setting of atrial fibrillation. The head CT scan done did not show   any acute ischemic stroke. We will evaluate further by obtaining an   MRI with diffusion study of the brain and also MRA of the head and   neck. We will also check 2-D echocardiogram to evaluate for   EF/structural heart disease. We will initiate the cerebrovascular   accident/transient ischemia attack admission protocol. We will keep   her n.p.o. pending speech and swallow evaluation. We will consult PT,   OT for evaluation. The patient was not a tPA candidate. Will resume   home aspirin and Plavix. 2. Paroxysmal atrial fibrillation. She is currently rate controlled. She   has very high risk for thromboembolism stroke considering underlying   risk factors. Ideally, she should be on an oral anticoagulant, but based   on age and fall risk, an oral anticoagulant may not be appropriate.  We will resume home aspirin and Plavix as mentioned above. We will also   follow up with 2-D echocardiogram and results. 3. History of chronic kidney disease, stage III. We will monitor BUN   and creatinine closely. Would avoid nephrotoxic agents. 4. History of coronary artery disease. Continue home aspirin. 5. History of hypertension. We will hold antihypertensive medication for   now and in the next 24 hours to allow for permissive hypertension. 6. Encephalopathy. This is most likely due to stroke. We will monitor   her closely and offer her supportive care. 7. Deep venous thrombosis prophylaxis. Sequential compression   device. 8. Disposition - She will be admitted to Neurology and was called for   routine progression of care. On behalf of the hospitalist group, thank you for the privilege of being   involved in the care of this pleasant, elderly lady.         Lesley Javed MD      NA / CLAUS   D:  04/28/2017   16:52   T:  04/28/2017   18:12   Job #:  [id number]

## 2017-04-28 NOTE — PROGRESS NOTES
Admission Medication Reconciliation:    Information obtained from: Medication list from [facility] and facility nurse    Significant PMH/Disease States:   Past Medical History:   Diagnosis Date    Angina, class II ([facility] [address] 3/5/2013    ARF (acute renal failure) ([facility] [address] 12/19/2010    Atrial fibrillation ([facility] [address] 7/28/2014    Bleeding per rectum 11/6/2009    Coronary atherosclerosis of native coronary artery 5/7/2011    DJD (degenerative joint disease)     DNR (do not resuscitate) 11/29/2011    Gout 11/6/2009    High cholesterol 11/6/2009    Hypertension     IHD (ischemic heart disease) 11/6/2009    Kidney disease, chronic, stage III (moderate, EGFR 30-59 ml/min) 11/6/2009    Kidney disease, chronic, stage III (moderate, EGFR 30-59 ml/min) 11/6/2009    Obesity     TIA (transient ischemic attack) 1/14/2011       Chief Complaint for this Admission:    Chief Complaint   Patient presents with    Facial Droop       Allergies:  Review of patient's allergies indicates no known allergies. Prior to Admission Medications:   Prior to Admission Medications   Prescriptions Last Dose Informant Patient Reported? Taking? POTASSIUM CHLORIDE SR 10 MEQ TAB 4/28/2017 at am  Yes Yes   Sig: Take 1 Cap by mouth two (2) times a day. amLODIPine-benazepril (LOTREL) 10-20 mg per capsule 4/28/2017 at Unknown time  No Yes   Sig: TAKE 1 CAPSULE BY MOUTH EVERY DAY   aspirin delayed-release 81 mg tablet 4/28/2017 at Unknown time  Yes Yes   Sig: Take 81 mg by mouth daily. carvedilol (COREG) 12.5 mg tablet 4/28/2017 at am  No Yes   Sig: TAKE 1 AND 1/2 TABLETS BY MOUTH TWICE DAILY WITH MEALS   cholecalciferol (VITAMIN D3) 1,000 unit tablet 4/28/2017 at Unknown time  Yes Yes   Sig: Take 1,000 Units by mouth daily. clopidogrel (PLAVIX) 75 mg tablet 4/28/2017 at Unknown time  No Yes   Sig: TAKE 1 TABLET BY MOUTH DAILY. colchicine 0.6 mg tablet   Yes Yes   Sig: Take 0.6 mg by mouth daily as needed (gout).    furosemide (LASIX) 20 mg tablet 4/28/2017 at Unknown time  Yes Yes   Sig: Take 20 mg by mouth two (2) times a day. isosorbide mononitrate ER (IMDUR) 30 mg tablet 4/28/2017 at Unknown time  Yes Yes   Sig: Take 30 mg by mouth daily. loperamide (IMODIUM) 2 mg capsule   Yes Yes   Sig: Take 2 mg by mouth every six (6) hours as needed for Diarrhea.   magnesium chloride (MAG DELAY) 64 mg tablet 4/28/2017 at Unknown time  Yes Yes   Sig: Take 64 mg by mouth daily. pravastatin (PRAVACHOL) 20 mg tablet   Yes Yes   Sig: Take 20 mg by mouth nightly. Indications: hypercholesterolemia   traMADol (ULTRAM) 50 mg tablet   Yes Yes   Sig: Take 50 mg by mouth every eight (8) hours as needed for Pain. Facility-Administered Medications: None         Comments/Recommendations:     Obtained medication list from [facility] and spoke with nurse to update patient's PTA medication list.    Modified furosemide. Marked medications taken today in table above per conversation with patient's nurse.       Dallas Vasquez, SagrarioD

## 2017-04-28 NOTE — PROGRESS NOTES
Problem: Falls - Risk of  Goal: *Absence of falls  Outcome: Progressing Towards Goal  Patient's call bell within reach. Informed patient and family to use if patient needs assistance out of bed.

## 2017-04-28 NOTE — ED NOTES
Pt arrived to ED and to CT. Primary RN greeted pt in CT for assessment. Per staff, last well known was 0930 this am with sudden L droop, L sided weakness, and aphasia. At baseline, pt able to ambulate with walker and able to speak  With clear speech. Primary RN unable to understand pt's speech in CT. Slight L sided droop.  both equal but weak. Pt able to lift both arms and both legs without drift. Denies any numbness/tingling. Follows all commands. Once pt arrived to treatment room her speech became more clear and comprehensible.

## 2017-04-28 NOTE — CONSULTS
Consult dictated. Admitted for slurred speech, left sided weakness which is significantly improved. TIA vs CVA. Already takes ASA+Plavix+statin.   Roberto Membreno MD

## 2017-04-29 NOTE — PROGRESS NOTES
Bedside shift change report given to Anh (oncoming nurse) by Ludmila Griggs (offgoing nurse). Report included the following information SBAR, Kardex, Intake/Output, MAR, Accordion, Recent Results and Alarm Parameters .

## 2017-04-29 NOTE — PROGRESS NOTES
Problem: Mobility Impaired (Adult and Pediatric)  Goal: *Acute Goals and Plan of Care (Insert Text)  Physical Therapy Goals  Initiated 4/29/2017  1. Patient will move from supine to sit and sit to supine in bed with supervision/set-up within 7 day(s). 2. Patient will transfer from bed to chair and chair to bed with moderate assistance using the least restrictive device within 7 day(s). 3. Patient will perform sit to stand with moderate assistance within 7 day(s). 4. Patient will ambulate with moderate assistance for 5 feet with the least restrictive device within 7 day(s). PHYSICAL THERAPY EVALUATION  Patient: Sergo Sanon (89 y.o. female)  Date: 4/29/2017  Primary Diagnosis: Altered mental status        Precautions:   Fall      ASSESSMENT :  Based on the objective data described below, the patient presents with LUE strength deficits and significantly impaired functional mobility s/p CVA. Patient is not a throughrough historian but indicates that she walked with a rollator prior to CVA. Today she required max assist of 2 people to transfer bed to bedside commode and then bedside commode to chair. Recommend 2 person assist for mobility and SNF rehab at discharge. Patient will benefit from skilled intervention to address the above impairments.   Patients rehabilitation potential is considered to be Fair  Factors which may influence rehabilitation potential include:   [ ]         None noted  [ ]         Mental ability/status  [X]         Medical condition  [ ]         Home/family situation and support systems  [ ]         Safety awareness  [ ]         Pain tolerance/management  [ ]         Other:        PLAN :  Recommendations and Planned Interventions:  [X]           Bed Mobility Training             [ ]    Neuromuscular Re-Education  [X]           Transfer Training                   [ ]    Orthotic/Prosthetic Training  [X]           Gait Training                         [ ]    Modalities  [X] Therapeutic Exercises           [ ]    Edema Management/Control  [X]           Therapeutic Activities            [X]    Patient and Family Training/Education  [ ]           Other (comment):     Frequency/Duration: Patient will be followed by physical therapy  5 times a week to address goals. Discharge Recommendations: [facility]  Further Equipment Recommendations for Discharge: none       SUBJECTIVE:   Patient stated Something happened yesterday.       OBJECTIVE DATA SUMMARY:   HISTORY:    Past Medical History:   Diagnosis Date    Angina, class II ([facility] 3/5/2013    ARF (acute renal failure) ([facility] 12/19/2010    Atrial fibrillation (HCC) 7/28/2014    Bleeding per rectum 11/6/2009    Coronary atherosclerosis of native coronary artery 5/7/2011    DJD (degenerative joint disease)      DNR (do not resuscitate) 11/29/2011    Gout 11/6/2009    High cholesterol 11/6/2009    Hypertension      IHD (ischemic heart disease) 11/6/2009    Kidney disease, chronic, stage III (moderate, EGFR 30-59 ml/min) 11/6/2009    Kidney disease, chronic, stage III (moderate, EGFR 30-59 ml/min) 11/6/2009    Obesity      TIA (transient ischemic attack) 1/14/2011     Past Surgical History:   Procedure Laterality Date    COLONOSCOPY   4/11/2005      Dr. Joel Waterman HX CHOLECYSTECTOMY   11/94    HX HEENT        [facility]     Prior Level of Function/Home Situation: lives at [place] [address] factors and/or comorbidities impacting plan of care:      Home Situation  Home Environment: [address] [place] [address] Name: [facility]  [place] Residence: One story  Living Alone: No  Support Systems: Family member(s)  Patient Expects to be Discharged to[de-identified] Skilled nursing facility  Current DME Used/Available at Home: Harry Hurley     EXAMINATION/PRESENTATION/DECISION MAKING:   Critical Behavior:  Neurologic State: Alert  Orientation Level: Oriented to person, Oriented to place  Cognition: Follows commands  Safety/Judgement: Awareness of environment  Hearing: Auditory  Auditory Impairment: Hard of hearing, bilateral           Strength:     LUE 3-/5         Functional Mobility:  Bed Mobility:     Supine to Sit: Minimum assistance        Transfers:  Sit to Stand: Maximum assistance  Stand to Sit: Maximum assistance                       Balance:   Standing: Impaired  Ambulation/Gait Training:  Distance (ft): 1 Feet (ft)  Assistive Device: Gait belt; Other (comment) (HHA)  Ambulation - Level of Assistance: Maximum assistance;Assist x2     Gait Description (WDL): Exceptions to WDL                 Speed/Xochitl: Shuffled  Step Length: Left shortened;Right shortened              Functional Measure:  Functional Reach:      Completed:  [ ] standing       [X] seated           Average: 0         Functional Reach Test and G-code impairment scale: For inches: Measure in cm and divide by 2.54  Percentage of Impairment CH     0%    CI     1-19% CJ     20-39% CK     40-59% CL     60-79% CM     80-99% CN      100%   Functional Reach  Score 15-25 cm 25 24 22-23 20-21 18-19 16-17 < 15   Functional Reach  Score 6-10 in 10           < 6           Functional reach: (standing)  Reaches £  6 in = High Fall Risk  Reaches 6-10 in = Moderate Fall Risk    Reaches ³  10 in = Low Fall Risk  patricia Zarate al. Functional reach: a new clinical measure of balance. J [facility] Karolina Liu; 39: [id number]. Modified Functional Reach: (seated)  Age: Men: Women:   21-39 17.9\" 17.6\"   40-59 17.5\" 15.9\"   60-79 14.4\" 13.2\"   80-97 14.0\" 12.5\"         Kasia Parisi Forward and Lateral Sitting Functional Reach in Younger, Middle-aged, and Older Adults. J Geriatric Phys Ther. 2007; 30:43-48         G codes:   In compliance with CMSs Claims Based Outcome Reporting, the following G-code set was chosen for this patient based on their primary functional limitation being treated: The outcome measure chosen to determine the severity of the functional limitation was the functional reach with a score of 0 inches which was correlated with the impairment scale. · Mobility - Walking and Moving Around:               - CURRENT STATUS:    CN - 100% impaired, limited or restricted               - GOAL STATUS:           CM - 80%-99% impaired, limited or restricted               - D/C STATUS:                       ---------------To be determined---------------      Physical Therapy Evaluation Charge Determination   History Examination Presentation Decision-Making   HIGH Complexity :3+ comorbidities / personal factors will impact the outcome/ POC  LOW Complexity : 1-2 Standardized tests and measures addressing body structure, function, activity limitation and / or participation in recreation  MEDIUM Complexity : Evolving with changing characteristics  HIGH Complexity : FOTO score of 1- 25       Based on the above components, the patient evaluation is determined to be of the following complexity level: HIGH      Pain:  Pain Scale 1: Numeric (0 - 10)  Pain Intensity 1: 0              Activity Tolerance:   BP WNL  Please refer to the flowsheet for vital signs taken during this treatment. After treatment:   [X]         Patient left in no apparent distress sitting up in chair  [ ]         Patient left in no apparent distress in bed  [X]         Call bell left within reach  [X]         Nursing notified  [ ]         Caregiver present  [ ]         Bed alarm activated      COMMUNICATION/EDUCATION:   The patients plan of care was discussed with: Registered Nurse and Rehabilitation Attendant. [X]         Fall prevention education was provided and the patient/caregiver indicated understanding. [ ]         Patient/family have participated as able in goal setting and plan of care.   [X]         Patient/family agree to work toward stated goals and plan of care.  [ ]         Patient understands intent and goals of therapy, but is neutral about his/her participation. [ ]         Patient is unable to participate in goal setting and plan of care.      Thank you for this referral.  Herrera Hoover, PT, DPT   Time Calculation: 20 mins

## 2017-04-29 NOTE — INTERDISCIPLINARY ROUNDS
IDR/SLIDR Summary          Patient: Christy Velez MRN: [id number]    Age: 80 y.o. YOB: 1911 Room/Bed: [facility]   Admit Diagnosis: Altered mental status  Principal Diagnosis: CVA, old, alterations of sensations   Goals: PT OT  Readmission: NO  Quality Measure: Not applicable  VTE Prophylaxis: Mechanical  Influenza Vaccine screening completed? YES  Pneumococcal Vaccine screening completed? NO  Mobility needs: Yes   Nutrition plan:Yes  Consults: P. T and O.T. Financial concerns:No  Escalated to CM? YES  RRAT Score: 9   Interventions:Home Health  Testing due for pt today?  NO  LOS: 1 days Expected length of stay 2 days  Discharge plan: [facility]   PCP: Nata Ham MD  Transportation needs: Yes    Days before discharge:one day until discharge   Discharge disposition: [facility]    Signed:     Yfn Freeman RN  4/29/2017  11:10 AM

## 2017-04-29 NOTE — PROGRESS NOTES
Hospitalist Progress Note  Sarina Van MD  Office: [phone]  Cell: [phone]      Date of Service:  2017  NAME:  Dominga Lanier  :  8/15/1911  MRN:  [id number]      Admission Summary:   81 yo female with PMhx Afib, TIAs, HTN, CKD 3, CAD, admitted from [facility] for AMS and facial droop. Interval history / Subjective:     Demented  Follows some directions  Do not know why she is in hospital.      Assessment & Plan:     1. Acute Left CVA  - Neuro check  - Neuro input   - MRI brain : prelim report noted  - on ASA/Plavix, should we switch to [address] [place] [address] and d/c ASA/Plavix ?, will await neuro input  - Carotid duplex: 70-80% Left ICA, not a candidate for intervention at this point    2. AFib   - Coreg  - ASA/Plavix    3. CKD IV - table    4. HTN   - c/w Coreg  - c/w lisinopril/HCTZ    5. CAD  - c/w ASA/Plavix/BB/Statin      Code status: DNR  DVT prophylaxis: SCDs    Care Plan discussed with: Patient/Family and Nurse  Disposition: SNF/LTC     Hospital Problems  Date Reviewed: 2016          Codes Class Noted POA    * (Principal)CVA, old, alterations of sensations ICD-10-CM: I69.398, R20.9  ICD-9-CM: 438.6  2017 Yes        Altered mental status ICD-10-CM: R41.82  ICD-9-CM: 780.97  2017 Unknown                Review of Systems:   Review of systems not obtained due to patient factors. Vital Signs:    Last 24hrs VS reviewed since prior progress note.  Most recent are:  Visit Vitals    /55 (BP 1 Location: Right arm, BP Patient Position: At rest)    Pulse 62    Temp 98.4 °F (36.9 °C)    Resp 19    Ht 5' 3\" (1.6 m)    Wt 66 kg (145 lb 8 oz)    SpO2 100%    BMI 25.77 kg/m2         Intake/Output Summary (Last 24 hours) at 17 1001  Last data filed at 17   Gross per 24 hour   Intake                0 ml   Output              675 ml   Net             -675 ml        Physical Examination: Constitutional:  No acute distress, cooperative, pleasant    ENT:  Oral mucous moist, oropharynx benign. Neck supple,    Resp:  CTA bilaterally. CV:  Irregular    GI:  Soft, non distended, non tender. normoactive bowel sounds,    Musculoskeletal:  No edema, warm, 2+ pulses throughout    Neurologic:  Moves all extremities. Oriented to self, Rt Facial droop     Psych:  poor insight, AOX0       Data Review:    Review and/or order of clinical lab test  Review and/or order of tests in the radiology section of CPT  Review and/or order of tests in the medicine section of CPT      Labs:     Recent Labs      04/28/17   1043   WBC  3.5*   HGB  8.0*   HCT  24.9*   PLT  333     Recent Labs      04/28/17   1043   NA  141   K  3.8   CL  107   CO2  26   BUN  28*   CREA  1.84*   GLU  112*   CA  8.0*     Recent Labs      04/28/17   1043   SGOT  8*   ALT  8*   AP  83   TBILI  0.4   TP  8.4*   ALB  2.2*   GLOB  6.2*     Recent Labs      04/28/17   1043  04/28/17   1031   INR  1.2*  1.2*   PTP  12.3*   --    APTT  25.6   --       No results for input(s): FE, TIBC, PSAT, FERR in the last 72 hours. Lab Results   Component Value Date/Time    Folate 49.4 05/07/2011 03:50 AM      No results for input(s): PH, PCO2, PO2 in the last 72 hours. No results for input(s): CPK, CKNDX, TROIQ in the last 72 hours.     No lab exists for component: CPKMB  Lab Results   Component Value Date/Time    Cholesterol, total 125 04/29/2017 01:24 AM    HDL Cholesterol 53 04/29/2017 01:24 AM    LDL, calculated 61.4 04/29/2017 01:24 AM    Triglyceride 53 04/29/2017 01:24 AM    CHOL/HDL Ratio 2.4 04/29/2017 01:24 AM     No results found for: [facility]  Lab Results   Component Value Date/Time    Color YELLOW/STRAW 01/10/2017 03:26 PM    Appearance CLOUDY 01/10/2017 03:26 PM    Specific gravity 1.012 01/10/2017 03:26 PM    Specific gravity 1.015 07/15/2010 01:30 PM    pH (UA) 5.0 01/10/2017 03:26 PM    Protein NEGATIVE  01/10/2017 03:26 PM    Glucose NEGATIVE  01/10/2017 03:26 PM    Ketone NEGATIVE  01/10/2017 03:26 PM    Bilirubin NEGATIVE  01/10/2017 03:26 PM    Urobilinogen 1.0 01/10/2017 03:26 PM    Nitrites NEGATIVE  01/10/2017 03:26 PM    Leukocyte Esterase MODERATE 01/10/2017 03:26 PM    Epithelial cells MODERATE 01/10/2017 03:26 PM    Bacteria 2+ 01/10/2017 03:26 PM    WBC 20-50 01/10/2017 03:26 PM    RBC 5-10 01/10/2017 03:26 PM         Medications Reviewed:     Current Facility-Administered Medications   Medication Dose Route Frequency    aspirin delayed-release tablet 81 mg  81 mg Oral DAILY    clopidogrel (PLAVIX) tablet 75 mg  75 mg Oral DAILY    colchicine tablet 0.6 mg  0.6 mg Oral DAILY PRN    pravastatin (PRAVACHOL) tablet 20 mg  20 mg Oral QHS    sodium chloride (NS) flush 5-10 mL  5-10 mL IntraVENous Q8H    sodium chloride (NS) flush 5-10 mL  5-10 mL IntraVENous PRN    acetaminophen (TYLENOL) tablet 650 mg  650 mg Oral Q4H PRN    Or    acetaminophen (TYLENOL) suppository 650 mg  650 mg Rectal Q4H PRN     ______________________________________________________________________  EXPECTED LENGTH OF STAY: - - -  ACTUAL LENGTH OF STAY:          1                 Nadya Hull MD

## 2017-04-29 NOTE — PROGRESS NOTES
Bedside and Verbal shift change report given to Byron Xiong (oncoming nurse) by Maurisio Gil (offgoing nurse). Report included the following information SBAR, Kardex, Intake/Output and Recent Results.

## 2017-04-30 NOTE — PROGRESS NOTES
Hospitalist Progress Note  Sofya Jaffe MD  Office: [phone]  Cell: [phone]      Date of Service:  2017  NAME:  Laurence Fernandez  :  8/15/1911  MRN:  [id number]      Admission Summary:   79 yo female with PMhx Afib, TIAs, HTN, CKD 3, CAD, admitted from [facility] for AMS and facial droop. Interval history / Subjective:     Demented  Awakes to name  Grand-daughters at bedside, patient had rough night and unable to sleep       Assessment & Plan:     1. Acute Left CVA  - Neuro check  - Neuro input   - MRI brain : noted  - on ASA/Plavix, [place] [facility] [place] d/c ASA/Plavix ?, will await neuro input  - Carotid duplex: 70-80% Left ICA, not a candidate for intervention at this point  - PT/OT : rec: SNF, d/w grand-daughter, may need to transfer patient to LTC. 2. AFib   - Coreg, decreased dose to 12.5mg for bradycardia  - ASA/Plavix    3. CKD IV - table    4. HTN   - c/w Coreg  - c/w lisinopril/HCTZ    5. CAD  - c/w ASA/Plavix/BB/Statin      Code status: DNR  DVT prophylaxis: SCDs    Care Plan discussed with: Patient/Family and Nurse  Disposition: SNF/LTC     Hospital Problems  Date Reviewed: 2016          Codes Class Noted POA    * (Principal)CVA, old, alterations of sensations ICD-10-CM: I69.398, R20.9  ICD-9-CM: 438.6  2017 Yes        Altered mental status ICD-10-CM: R41.82  ICD-9-CM: 780.97  2017 Unknown                Review of Systems:   Review of systems not obtained due to patient factors. Vital Signs:    Last 24hrs VS reviewed since prior progress note.  Most recent are:  Visit Vitals    BP 99/47 (BP 1 Location: Right arm, BP Patient Position: At rest)    Pulse 61    Temp 98.2 °F (36.8 °C)    Resp 20    Ht 5' 3\" (1.6 m)    Wt 68.9 kg (151 lb 14.4 oz)    SpO2 100%    BMI 26.91 kg/m2       No intake or output data in the 24 hours ending 17 0386     Physical Examination: Constitutional:  No acute distress, cooperative, pleasant    ENT:  Oral mucous moist   Resp:  CTA bilaterally. CV:  Irregular    GI:  Soft, non distended, non tender. normoactive bowel sounds,    Musculoskeletal:  No edema, warm, 2+ pulses throughout    Neurologic:  Moves all extremities. Oriented to self, Rt Facial droop     Psych:  poor insight, AOX0       Data Review:    Review and/or order of clinical lab test  Review and/or order of tests in the radiology section of CPT  Review and/or order of tests in the medicine section of CPT      Labs:     Recent Labs      04/28/17   1043   WBC  3.5*   HGB  8.0*   HCT  24.9*   PLT  333     Recent Labs      04/28/17   1043   NA  141   K  3.8   CL  107   CO2  26   BUN  28*   CREA  1.84*   GLU  112*   CA  8.0*     Recent Labs      04/28/17   1043   SGOT  8*   ALT  8*   AP  83   TBILI  0.4   TP  8.4*   ALB  2.2*   GLOB  6.2*     Recent Labs      04/28/17   1043  04/28/17   1031   INR  1.2*  1.2*   PTP  12.3*   --    APTT  25.6   --       No results for input(s): FE, TIBC, PSAT, FERR in the last 72 hours. Lab Results   Component Value Date/Time    Folate 49.4 05/07/2011 03:50 AM      No results for input(s): PH, PCO2, PO2 in the last 72 hours. No results for input(s): CPK, CKNDX, TROIQ in the last 72 hours.     No lab exists for component: CPKMB  Lab Results   Component Value Date/Time    Cholesterol, total 125 04/29/2017 01:24 AM    HDL Cholesterol 53 04/29/2017 01:24 AM    LDL, calculated 61.4 04/29/2017 01:24 AM    Triglyceride 53 04/29/2017 01:24 AM    CHOL/HDL Ratio 2.4 04/29/2017 01:24 AM     Lab Results   Component Value Date/Time    Glucose (POC) 99 04/30/2017 07:33 AM     Lab Results   Component Value Date/Time    Color YELLOW/STRAW 01/10/2017 03:26 PM    Appearance CLOUDY 01/10/2017 03:26 PM    Specific gravity 1.012 01/10/2017 03:26 PM    Specific gravity 1.015 07/15/2010 01:30 PM    pH (UA) 5.0 01/10/2017 03:26 PM    Protein NEGATIVE  01/10/2017 03:26 PM    Glucose NEGATIVE  01/10/2017 03:26 PM    Ketone NEGATIVE  01/10/2017 03:26 PM    Bilirubin NEGATIVE  01/10/2017 03:26 PM    Urobilinogen 1.0 01/10/2017 03:26 PM    Nitrites NEGATIVE  01/10/2017 03:26 PM    Leukocyte Esterase MODERATE 01/10/2017 03:26 PM    Epithelial cells MODERATE 01/10/2017 03:26 PM    Bacteria 2+ 01/10/2017 03:26 PM    WBC 20-50 01/10/2017 03:26 PM    RBC 5-10 01/10/2017 03:26 PM         Medications Reviewed:     Current Facility-Administered Medications   Medication Dose Route Frequency    carvedilol (COREG) tablet 12.5 mg  12.5 mg Oral BID WITH MEALS    furosemide (LASIX) tablet 20 mg  20 mg Oral BID    isosorbide mononitrate ER (IMDUR) tablet 30 mg  30 mg Oral DAILY    amLODIPine (NORVASC) tablet 10 mg  10 mg Oral DAILY    And    lisinopril (PRINIVIL, ZESTRIL) tablet 20 mg  20 mg Oral DAILY    aspirin delayed-release tablet 81 mg  81 mg Oral DAILY    clopidogrel (PLAVIX) tablet 75 mg  75 mg Oral DAILY    colchicine tablet 0.6 mg  0.6 mg Oral DAILY PRN    pravastatin (PRAVACHOL) tablet 20 mg  20 mg Oral QHS    sodium chloride (NS) flush 5-10 mL  5-10 mL IntraVENous Q8H    sodium chloride (NS) flush 5-10 mL  5-10 mL IntraVENous PRN    acetaminophen (TYLENOL) tablet 650 mg  650 mg Oral Q4H PRN    Or    acetaminophen (TYLENOL) suppository 650 mg  650 mg Rectal Q4H PRN     ______________________________________________________________________  EXPECTED LENGTH OF STAY: - - -  ACTUAL LENGTH OF STAY:          2                 Roberth Magana MD

## 2017-04-30 NOTE — PROGRESS NOTES
Bedside shift change report given to Evette Lopez RN (oncoming nurse) by Roc Snyder RN (offgoing nurse). Report included the following information SBAR, Kardex, Intake/Output, MAR, Recent Results and Cardiac Rhythm A fib.

## 2017-04-30 NOTE — PROGRESS NOTES
Pt presenting with mental status changes compared to yesterday's assessment. Pt more lethargic, speech more incomprehensible, not recognizing family members. Made MD aware. Order for STAT head CT. Will continue to monitor. 1630 Pt HR remaining between 45-60 bpm. BP stable. Paged MD. Marco Perez held.

## 2017-04-30 NOTE — INTERDISCIPLINARY ROUNDS
IDR/SLIDR Summary             Patient: Cynthia Freeman MRN: [id number] Age: 80 y.o. YOB: 1911 Room/Bed: [facility]   Admit Diagnosis: Altered mental status  Principal Diagnosis: CVA, old, alterations of sensations   Goals: PT OT; DC planning  Readmission: NO  Quality Measure: Not applicable  VTE Prophylaxis: Mechanical  Influenza Vaccine screening completed? YES  Pneumococcal Vaccine screening completed? NO  Mobility needs: Yes   Nutrition plan:Yes  Consults: P. T and O.T. Financial concerns:No  Escalated to CM? YES  RRAT Score: 9   Interventions:Home Health  Testing due for pt today?  NO  LOS: 1 days Expected length of stay 2 days  Discharge plan: [facility]   PCP: Sondra Pompa MD  Transportation needs: Yes    Days before discharge:d/c pending   Discharge disposition: [place] vs SNF/Rehab per PT     Signed:      Chirag Alejandre RN  4/30/2017  723 AM

## 2017-04-30 NOTE — PROGRESS NOTES
Primary Nurse Foreign Loomis RN and Joycelyn Harrington RN performed a dual skin assessment on this patient Impairment noted- [abrasions to bilateral knees and shins s/p fall PTA; areas of hypopigmentation scattered] Vinny score is 14

## 2017-04-30 NOTE — PROGRESS NOTES
CM reviewed chart and noted that patient, 89 years, t was brought to ER from [address] [place] [address] with AMS, slurred speech and left sided weakness. Admitted with CVA. Patient has hx of paroxysmal A-Fib, TIA, hypertension, CAD, chronic kidney diease, and dyslipidemia. Prior to admission, chart indicates that  patient was living at [facility]--  was ambulatory with rollator, alert and speaks clearly, and independent with most activities of daily living. No indication of significant dementia. PCP is Dr Yina Cho-- DNR and advance medical directive scanned in chart. MPOA--  Granddaughter- Pablo Knowles [phone] is primary and second is granddaughter -Juan Rivas. (they are sisters)    CM met with granddaughters in patient's room to introduce self and explain role. Patient was sleeping and did not participate in visit. Maira Norton confirmed the above and added that she was with patient Thursday night at the AL and patient was fine. . Patient has lived in the [address] [place] [address] since . Maira Norton said patient was able to go out with the family prior to admission. Maira Norton said patient had only only child--daughter (Jairo's mother). . Daughter is . CM explained that patient was evaluated by therapy and Snf was being recommended. .  CM discussed Snf options and a list given-- Maira Norton and Brandee Reid chose [facility],[address] [facility]  [phone], and were in agreement with CM sending referral to the facility. Authorization will be needed as patient has [address] of choice letter placed in chart and referral made to [address] [place] [address],[address] Floor via [facility]. The goal is for patient to [facility] and return to AL. Family aware that this may not be possible and patient will need LTC in a facility. Cm will continue to follow and assist with transition of care planning. Care Management Interventions  PCP Verified by CM:  Yes  Palliative Care Consult (Criteria: CHF and RRAT>21): No  Mode of Transport at Discharge:  (TBD )  Transition of Care Consult (CM Consult): Discharge Planning  Discharge Durable Medical Equipment: No  Physical Therapy Consult: Yes  Occupational Therapy Consult: Yes  Speech Therapy Consult: Yes  Current Support Network: Assisted Living ([facility] assisted living-- ambulated with walker and fairly indpeendent, alert and oriented  prior to Kyle Hernandez UDirk Naqvi. .  AMD in chart  DNR)  Confirm Follow Up Transport:  (TBD)  Plan discussed with Pt/Family/Caregiver: Yes  Freedom of Choice Offered: Yes (Freedom of choice letter completed and placed in chart)  Discharge Location  Discharge Placement: Skilled nursing facility

## 2017-04-30 NOTE — PROGRESS NOTES
Bedside and Verbal shift change report given to Senia Reina (oncoming nurse) by Carlitos Saleh (offgoing nurse). Report included the following information SBAR, Kardex, Intake/Output, MAR, Recent Results and Cardiac Rhythm Afib.

## 2017-04-30 NOTE — PROGRESS NOTES
1945 Bedside shift change report given to [facility], [facility] (oncoming nurse) by Angelica Treviño RN (offgoing nurse). Report included the following information SBAR, Kardex, Intake/Output, MAR, Recent Results and Cardiac Rhythm A fib.

## 2017-05-01 NOTE — PROGRESS NOTES
I have reviewed discharge instructions with the caregiver. The caregiver verbalized understanding. Patient being discharged to [facility],5[address] via ambulance. Report called to [facility]. Belongings with family and patient. Vitals within normal limits.

## 2017-05-01 NOTE — PROGRESS NOTES
Problem: Discharge Planning  Goal: *Discharge to safe environment  Outcome: Resolved/Met Date Met:  05/01/17  Discharge to Snf for rehab

## 2017-05-01 NOTE — DISCHARGE INSTRUCTIONS
Discharging provider: Wendy Arevalo MD    Primary care provider: Stanley Baker MD    [address] COURSE:    81 yo female with PMhx Afib, TIAs, HTN, CKD 3, CAD, admitted from [facility] for AMS and facial droop.      1. Acute Left CVA  - Neuro check  - Neuro input   - MRI brain : noted  - on ASA/Plavix,  - Carotid duplex: 70-80% Left ICA, not a candidate for intervention at this point  - PT/OT : rec: SNF, d/w grand-daughter, may need to transfer patient to LTC.      2. AFib   - Coreg   - ASA/Plavix     3. CKD IV - table     4. HTN   - c/w Coreg and lasix  - d/c Lisinopril/Amlodipine    5. CAD  - c/w ASA/Plavix/BB/Statin    FOLLOW-UP CARE RECOMMENDATIONS:    APPOINTMENTS:  Follow-up Information     Follow up With Details Comments Contact Info    Stanley Baker MD In 2 weeks discharge follow up  [address]  [phone]              It is very important that you keep follow-up appointment(s). Bring discharge papers, medication list (and/or medication bottles) to follow-up appointments for review by outpatient provider(s). FOLLOW-UP TESTS RECOMMENDED:   - none    ONGOING TREATMENT PLAN: rehab     PENDING TEST RESULTS:  At the time of discharge the following test results are still pending: none. Please review these results as they become available. Specific symptoms to watch for: chest pain, shortness of breath, fever, chills, nausea, vomiting, diarrhea, change in mentation, falling, weakness, bleeding.     DIET:  Cardiac Diet    ACTIVITY:  Activity as tolerated    WOUND CARE: none    EQUIPMENT needed:  none    INCIDENTAL FINDINGS:  none    GOALS OF CARE:  X  Eventual return to home/independent/assisted living     Long term SNF      Hospice     No rehospitalization     Patient condition at discharge:   Functional status    Poor    X  Deconditioned      Independent   Cognition    Lucid     Forgetful (some sensescence)   X  Dementia Catheters/lines (plus indication)    Peterson     PICC      PEG         Code status    Full code    X  DNR    . . . . . . . . . . . . . . . . . . . . . . . . . . . . . . . . . . . . . . . . . . . . . . . . . . . . . . . . . . . . . . . . . . . . . . . Yannick Milan CHRONIC MEDICAL CONDITIONS:  Problem List as of 5/1/2017  Date Reviewed: 7/6/2016          Codes Class Noted - Resolved    * (Principal)CVA, old, alterations of sensations ICD-10-CM: I69.398, R20.9  ICD-9-CM: 438.6  4/28/2017 - Present        Altered mental status ICD-10-CM: R41.82  ICD-9-CM: 780.97  4/28/2017 - Present        Diuretic-induced hypokalemia ICD-10-CM: E87.6, T50.2X5A  ICD-9-CM: 276.8, E944.4  4/20/2015 - Present        Essential hypertension (Chronic) ICD-10-CM: I10  ICD-9-CM: 401.9  4/15/2015 - Present        Atrial fibrillation (HCC) (Chronic) ICD-10-CM: I48.91  ICD-9-CM: 427.31  7/28/2014 - Present        Angina, class II ([address] ICD-10-CM: I20.9  ICD-9-CM: 413.9  3/5/2013 - Present    Overview Signed 3/5/2013  6:03 AM by Geovanna Berry MD     Stable for years.              Obesity ICD-10-CM: E66.9  ICD-9-CM: 278.00  2/25/2013 - Present        Advanced age ICD-9-CM: Pinkie Leys  2/29/2012 - Present        DNR (do not resuscitate) ICD-10-CM: Z66  ICD-9-CM: V49.86  11/29/2011 - Present        Coronary atherosclerosis of native coronary artery (Chronic) ICD-10-CM: I25.10  ICD-9-CM: 414.01  5/7/2011 - Present        Carpal tunnel syndrome ICD-10-CM: G56.00  ICD-9-CM: 354.0  3/28/2011 - Present        TIA (transient ischemic attack) ICD-10-CM: G45.9  ICD-9-CM: 435.9  1/14/2011 - Present        Anxiety reaction ICD-10-CM: F41.1  ICD-9-CM: 300.00  7/29/2010 - Present        Right lumbar radiculopathy ICD-10-CM: M54.16  ICD-9-CM: 724.4  5/25/2010 - Present        DJD (degenerative joint disease), multiple sites ICD-10-CM: M15.9  ICD-9-CM: 715.89  3/31/2010 - Present        Insomnia ICD-10-CM: G47.00  ICD-9-CM: 780.52  3/31/2010 - Present        Gout (Chronic) ICD-10-CM: M10.9  ICD-9-CM: 274.9  11/6/2009 - Present        High cholesterol (Chronic) ICD-10-CM: E78.00  ICD-9-CM: 272.0  11/6/2009 - Present        Kidney disease, chronic, stage III (moderate, EGFR 30-59 ml/min) ICD-10-CM: N18.3  ICD-9-CM: 585.3  11/6/2009 - Present        RESOLVED: Dehydration ICD-10-CM: E86.0  ICD-9-CM: 276.51  5/6/2011 - 7/27/2011        RESOLVED: Nausea ICD-10-CM: R11.0  ICD-9-CM: 787.02  5/6/2011 - 10/22/2012        RESOLVED: Diarrhea ICD-10-CM: R19.7  ICD-9-CM: 787.91  5/6/2011 - 10/22/2012        RESOLVED: Nausea & vomiting ICD-10-CM: R11.2  ICD-9-CM: 787.01  12/19/2010 - 3/28/2011        RESOLVED: Diarrhea ICD-10-CM: R19.7  ICD-9-CM: 787.91  12/19/2010 - 3/28/2011        RESOLVED: ARF (acute renal failure) ([facility] ICD-10-CM: N17.9  ICD-9-CM: 584.9  12/19/2010 - 3/28/2011        RESOLVED: Dehydration ICD-10-CM: E86.0  ICD-9-CM: 276.51  12/19/2010 - 3/28/2011        RESOLVED: Bleeding per rectum ICD-10-CM: K62.5  ICD-9-CM: 569.3  11/6/2009 - 12/19/2010    Overview Signed 11/6/2009  9:52 AM by Elma Ybarra     Secondary to hemorrhoids

## 2017-05-01 NOTE — DISCHARGE SUMMARY
Discharge Summary     Patient:  Rajendra Jeffery       MRN: [id number]       YOB: 1911       Age: 80 y.o. Date of admission:  4/28/2017    Date of discharge:  5/1/2017    Primary care provider: Dr. Franchesca Donnelly MD    Admitting provider:  Joshua Tomas MD    Discharging provider:  Robert Concepcion MD - [phone]  If unavailable, call [phone] and ask the  to page the triage hospitalist.    Consultations  · IP CONSULT TO HOSPITALIST  · IP CONSULT TO NEUROLOGY    Procedures  · * No surgery found *    Discharge destination: SNF. The patient is stable for discharge. Admission diagnosis  · Altered mental status    Current Discharge Medication List      CONTINUE these medications which have NOT CHANGED    Details   colchicine 0.6 mg tablet Take 0.6 mg by mouth daily as needed (gout). isosorbide mononitrate ER (IMDUR) 30 mg tablet Take 30 mg by mouth daily. furosemide (LASIX) 20 mg tablet Take 20 mg by mouth two (2) times a day. loperamide (IMODIUM) 2 mg capsule Take 2 mg by mouth every six (6) hours as needed for Diarrhea. POTASSIUM CHLORIDE SR 10 MEQ TAB Take 1 Cap by mouth two (2) times a day. pravastatin (PRAVACHOL) 20 mg tablet Take 20 mg by mouth nightly. Indications: hypercholesterolemia      traMADol (ULTRAM) 50 mg tablet Take 50 mg by mouth every eight (8) hours as needed for Pain. cholecalciferol (VITAMIN D3) 1,000 unit tablet Take 1,000 Units by mouth daily. carvedilol (COREG) 12.5 mg tablet TAKE 1 AND 1/2 TABLETS BY MOUTH TWICE DAILY WITH MEALS  Qty: 90 Tab, Refills: 5      clopidogrel (PLAVIX) 75 mg tablet TAKE 1 TABLET BY MOUTH DAILY. Qty: 30 Tab, Refills: 11      aspirin delayed-release 81 mg tablet Take 81 mg by mouth daily. Associated Diagnoses: RIND (reversible ischemic neurologic deficit), acute ([facility]  Coronary atherosclerosis of native coronary artery      magnesium chloride (MAG DELAY) 64 mg tablet Take 64 mg by mouth daily. STOP taking these medications       amLODIPine-benazepril (LOTREL) 10-20 mg per capsule Comments:   Reason for Stopping: Follow-up Information     Follow up With Details Comments [address] Jeronimo MD In 2 weeks discharge follow up  [facility]  [phone]            Final discharge diagnoses and brief hospital course  Please also refer to the admission H&P for details on the presenting problem. 79 yo female with PMhx Afib, TIAs, HTN, CKD 3, CAD, admitted from [facility] for AMS and facial droop.      1. Acute Left CVA  - Neuro check  - Neuro input   - MRI brain : noted  - on ASA/Plavix,  - Carotid duplex: 70-80% Left ICA, not a candidate for intervention at this point  - PT/OT : rec: SNF, d/w grand-daughter, may need to transfer patient to LTC.      2. AFib   - Coreg   - ASA/Plavix     3. CKD IV - table     4. HTN   - c/w Coreg and lasix  - d/c Lisinopril/Amlodipine    5. CAD  - c/w ASA/Plavix/BB/Statin    FOLLOW-UP CARE RECOMMENDATIONS:    APPOINTMENTS:  Follow-up Information     Follow up With Details Comments Contact Info    Loulou Gil MD In 2 weeks discharge follow up  [address]  [phone]              It is very important that you keep follow-up appointment(s). Bring discharge papers, medication list (and/or medication bottles) to follow-up appointments for review by outpatient provider(s). FOLLOW-UP TESTS RECOMMENDED:   - none    ONGOING TREATMENT PLAN: rehab     PENDING TEST RESULTS:  At the time of discharge the following test results are still pending: none. Please review these results as they become available.     Specific symptoms to watch for: chest pain, shortness of breath, fever, chills, nausea, vomiting, diarrhea, change in mentation, falling, weakness, bleeding. DIET:  Cardiac Diet    ACTIVITY:  Activity as tolerated    WOUND CARE: none    EQUIPMENT needed:  none    INCIDENTAL FINDINGS:  none    GOALS OF CARE:  X  Eventual return to home/independent/assisted living     Long term SNF      Hospice     No rehospitalization     Patient condition at discharge:   Functional status    Poor    X  Deconditioned      Independent   Cognition    Lucid     Forgetful (some sensescence)   X  Dementia   Catheters/lines (plus indication)    Peterson     PICC      PEG         Code status    Full code    X  DNR        Physical examination at discharge  Visit Vitals    /57    Pulse 66    Temp 99.6 °F (37.6 °C)    Resp 20    Ht 5' 3\" (1.6 m)    Wt 71.3 kg (157 lb 3 oz)    SpO2 98%    BMI 27.84 kg/m2     Constitutional: No acute distress, cooperative, pleasant    ENT: Oral mucous moist   Resp: CTA bilaterally. CV: Irregular   GI: Soft, non distended, non tender. normoactive bowel sounds,   Musculoskeletal: No edema, warm, 2+ pulses throughout   Neurologic: Moves all extremities. Oriented to self, Rt Facial droop   Psych: poor insight, AOX0      Pertinent imaging studies:    MRI brain w/o cont:  IMPRESSION:   1. Tiny acute left posterior frontal insular cortical infarct. 2. Generalized cerebral volume loss and confluent white matter disease somewhat  greater than expected. 3. Right mastoid and possibly middle ear effusion. Correlate clinically. Carotid Duplex B/L:  IMPRESSION: Consistent with less than 50% stenosis of the right internal carotid and greater than 70% stenosis (likely greater than  80%) of the left internal carotid. Vertebrals are patent with antegrade flow.     Recent Labs      04/28/17   1043   WBC  3.5*   HGB  8.0*   HCT  24.9*   PLT  333     Recent Labs      04/28/17   1043   NA  141   K  3.8   CL  107   CO2  26   BUN  28*   CREA  1.84*   GLU  112*   CA  8.0*     Recent Labs      04/28/17   1043   SGOT  8*   AP  83   TP  8.4*   ALB  2.2*   GLOB 6.2*     Recent Labs      04/28/17   1043  04/28/17   1031   INR  1.2*  1.2*   PTP  12.3*   --    APTT  25.6   --       No results for input(s): FE, TIBC, PSAT, FERR in the last 72 hours. No results for input(s): PH, PCO2, PO2 in the last 72 hours. No results for input(s): CPK, CKMB in the last 72 hours. No lab exists for component: TROPONINI  No components found for: [place]    Chronic Diagnoses:    Problem List as of 5/1/2017  Date Reviewed: 7/6/2016          Codes Class Noted - Resolved    * (Principal)CVA, old, alterations of sensations ICD-10-CM: I69.398, R20.9  ICD-9-CM: 438.6  4/28/2017 - Present        Altered mental status ICD-10-CM: R41.82  ICD-9-CM: 780.97  4/28/2017 - Present        Diuretic-induced hypokalemia ICD-10-CM: E87.6, T50.2X5A  ICD-9-CM: 276.8, E944.4  4/20/2015 - Present        Essential hypertension (Chronic) ICD-10-CM: I10  ICD-9-CM: 401.9  4/15/2015 - Present        Atrial fibrillation (HCC) (Chronic) ICD-10-CM: I48.91  ICD-9-CM: 427.31  7/28/2014 - Present        Angina, class II ([address] ICD-10-CM: I20.9  ICD-9-CM: 413.9  3/5/2013 - Present    Overview Signed 3/5/2013  6:03 AM by Keshia Morelos MD     Stable for years.              Obesity ICD-10-CM: E66.9  ICD-9-CM: 278.00  2/25/2013 - Present        Advanced age ICD-9-CM: [id number]  2/29/2012 - Present        DNR (do not resuscitate) ICD-10-CM: Z66  ICD-9-CM: V49.86  11/29/2011 - Present        Coronary atherosclerosis of native coronary artery (Chronic) ICD-10-CM: I25.10  ICD-9-CM: 414.01  5/7/2011 - Present        Carpal tunnel syndrome ICD-10-CM: G56.00  ICD-9-CM: 354.0  3/28/2011 - Present        TIA (transient ischemic attack) ICD-10-CM: G45.9  ICD-9-CM: 435.9  1/14/2011 - Present        Anxiety reaction ICD-10-CM: F41.1  ICD-9-CM: 300.00  7/29/2010 - Present        Right lumbar radiculopathy ICD-10-CM: M54.16  ICD-9-CM: 724.4  5/25/2010 - Present        DJD (degenerative joint disease), multiple sites ICD-10-CM: M15.9  ICD-9-CM: 715.89  3/31/2010 - Present        Insomnia ICD-10-CM: G47.00  ICD-9-CM: 780.52  3/31/2010 - Present        Gout (Chronic) ICD-10-CM: M10.9  ICD-9-CM: 274.9  11/6/2009 - Present        High cholesterol (Chronic) ICD-10-CM: E78.00  ICD-9-CM: 272.0  11/6/2009 - Present        Kidney disease, chronic, stage III (moderate, EGFR 30-59 ml/min) ICD-10-CM: N18.3  ICD-9-CM: 585.3  11/6/2009 - Present        RESOLVED: Dehydration ICD-10-CM: E86.0  ICD-9-CM: 276.51  5/6/2011 - 7/27/2011        RESOLVED: Nausea ICD-10-CM: R11.0  ICD-9-CM: 787.02  5/6/2011 - 10/22/2012        RESOLVED: Diarrhea ICD-10-CM: R19.7  ICD-9-CM: 787.91  5/6/2011 - 10/22/2012        RESOLVED: Nausea & vomiting ICD-10-CM: R11.2  ICD-9-CM: 787.01  12/19/2010 - 3/28/2011        RESOLVED: Diarrhea ICD-10-CM: R19.7  ICD-9-CM: 787.91  12/19/2010 - 3/28/2011        RESOLVED: ARF (acute renal failure) ([facility] ICD-10-CM: N17.9  ICD-9-CM: 584.9  12/19/2010 - 3/28/2011        RESOLVED: Dehydration ICD-10-CM: E86.0  ICD-9-CM: 276.51  12/19/2010 - 3/28/2011        RESOLVED: Bleeding per rectum ICD-10-CM: K62.5  ICD-9-CM: 569.3  11/6/2009 - 12/19/2010    Overview Signed 11/6/2009  9:52 AM by Jackie Ceron     Secondary to hemorrhoids                   Time spent on discharge related activities today greater than 30 minutes. Signed:  Stephen Cheung MD                 Hospitalist, Internal Medicine      Cc:  Yusra Hay MD

## 2017-05-01 NOTE — PROGRESS NOTES
Reviewed chart and discussed case with nsg who reports patient without difficulty communicating and slurred speech remains improved throughout the weekend. Patient scheduled for discharge today. No further acute care SLP needs indicated at this time. Please re-consult if further needs should arise. Thanks. Shira Little M.CD.  CCC-SLP

## 2017-05-01 NOTE — PROGRESS NOTES
Patient accepted for Snf[facility] [phone] and per Reina Gomez in admissions, patient can be admitted today. Cm talked with physician who said patient can be discharged today to the facility. CM met with granddaughters, Sagar Skinner and  thanh, and they are in agreement with patient being discharged today to [facility]. Patient was more alert and said she was feeling better and ok with going to rehab today. Granddaughters are aware that patient may need LTC at the facility but hoping she can improve to the point she can return to [facility]. Referral sent via [employer] to medical transport for 2 pm transport and confirmed. Nurse to call report to [phone]. Envelope and ambulance form on patient's chart.

## 2017-05-01 NOTE — PROGRESS NOTES
Problem: Ischemic Stroke: Day 3  Goal: Activity/Safety  Outcome: Progressing Towards Goal  Fall prevention - side rails x3, hourly rounds, bed locked/low position, personal belongings within reach, family member at bedside  Goal: Discharge Planning  Outcome: Progressing Towards Goal  Discharge planning in progress, plans for [facility] care [facility]  Goal: *Stroke education continued(Stroke Metric)  Outcome: Progressing Towards Goal  Stroke risk factors, signs and symptoms    Transition RN to the Bedside to assist Primary RN with patient education, medication educations, discharge instructions, and stroke core measure compliance. Discharge concerns initiated and discussed with patient, including clarification on \"who\" assists the patient at their home and instructions for when the home going patient should call their provider after discharge. Opportunity for questions and clarification was provided. Patient receptive to education: YES  Patient stated:   Barriers to Education: hard of hearing, some confusion  Diagnosis Education given:  YES    Length of stay: 3  Expected Day of Discharge: tbd  Ask if they have \"Help at Home\" & add to white board?   NO, pt going to SNF    Education Day #: 1    Medication Education Given:  YES  M in the box Medication name: plavix, lasix      Stroke Education documented in Patient Education: YES  Core Measures Documented in Connect Care:  Risk Factors: YES  Warning signs of stroke: YES  When to Activate 911: YES  Medication Education for Risk Factors: YES  Smoking cessation if applicable: no  Written Education Given:  YES    Discharge NIH Completed: NO  Score: tbd    BRAINS: YES    Follow Up Appointment Made: NO  Date/Time if applicable: tbd

## 2017-05-01 NOTE — INTERDISCIPLINARY ROUNDS
IDR/SLIDR Summary          Patient: Theodora Solomon MRN: [id number]    Age: 80 y.o. YOB: 1911 Room/Bed: [facility]   Admit Diagnosis: Altered mental status  Principal Diagnosis: CVA, old, alterations of sensations   Goals: d/c planning  Readmission: NO  Quality Measure: Not applicable  VTE Prophylaxis: Mechanical  Influenza Vaccine screening completed? YES  Pneumococcal Vaccine screening completed? YES  Mobility needs: Yes   Nutrition plan:No  Consults:P.T, O.T., Speech and Case Management    Financial concerns:No  Escalated to CM? NO  RRAT Score: 9     Testing due for pt today?  NO  LOS: 3 days Expected length of stay 3-4 days  Discharge plan: SNF   PCP: Yusra Hay MD  Transportation needs: Yes    Days before discharge:one day until discharge   Discharge disposition: SNF    Signed:     Leanne Linares RN  5/1/2017  09:00 AM

## 2017-05-01 NOTE — INTERDISCIPLINARY ROUNDS
IDR/SLIDR Summary          Patient: Kane Back MRN: [id number]    Age: 80 y.o. YOB: 1911 Room/Bed: [facility]   Admit Diagnosis: Altered mental status  Principal Diagnosis: CVA, old, alterations of sensations   Goals: SNF  Readmission: NO  Quality Measure: Not applicable  VTE Prophylaxis: Mechanical  Influenza Vaccine screening completed? YES  Pneumococcal Vaccine screening completed? NO  Mobility needs: Yes   Nutrition plan:Yes  Consults:Case Management    Financial concerns:No  Escalated to CM? NO  RRAT Score: ? [facility]  Testing due for pt today?  NO  LOS: 3 days Expected length of stay 3 days  Discharge plan: [facility]   PCP: Mariama Shannon MD  Transportation needs: Yes    Days before discharge:ready for discharge  Discharge disposition: SNF    Signed:     Grover Ivory RN  5/1/2017  9:57 AM

## 2017-05-01 NOTE — PROGRESS NOTES
Bedside and Verbal shift change report given to Laura rn (oncoming nurse) by Josh Carver rn (offgoing nurse). Report included the following information SBAR, Kardex, ED Summary, MAR, Recent Results and Cardiac Rhythm A fib.

## 2017-06-17 PROBLEM — J81.1 PULMONARY EDEMA: Status: ACTIVE | Noted: 2017-01-01

## 2017-06-17 PROBLEM — I21.4 NSTEMI (NON-ST ELEVATED MYOCARDIAL INFARCTION) (HCC): Status: ACTIVE | Noted: 2017-01-01

## 2017-06-17 NOTE — PROGRESS NOTES
Pharmacy  [facility] Monitoring     Enoxaparin Indication: NSTEMI  Current Dose: 1 mg/kg every 12 hours   Creatinine Clearance: 10  Recent Labs      06/17/17   0409   CREA  2.30*   BUN  39*   HGB  9.6*   PLT  302     Wt Readings from Last 1 Encounters:   05/01/17 71.3 kg (157 lb 3 oz)   There is no height or weight on file to calculate BMI.     Impression/Plan: decreased lovenox to 1 mg/kg every 24 hours due to CrCl < 30  Used previous weight from ~ 1 month ago, 71 kg     Thanks,  [facility]

## 2017-06-17 NOTE — PROGRESS NOTES
T    PCU SHIFT NURSING NOTE      Bedside shift change report given to Gia Boyd (oncoming nurse) by Jean-Paul Peres (offgoing nurse). Report included the following information SBAR, ED Summary, Intake/Output, MAR, Recent Results and Cardiac Rhythm afib. Shift Summary:  RANSFER - IN REPORT:    Verbal report received from Caroline(name) on Gurwinder Park  being received from ED(unit) for routine progression of care      Report consisted of patients Situation, Background, Assessment and   Recommendations(SBAR). Information from the following report(s) SBAR, Kardex, Intake/Output, MAR, Recent Results and Cardiac Rhythm Afib was reviewed with the receiving nurse. Opportunity for questions and clarification was provided. Assessment completed upon patients arrival to unit and care assumed. 1845 received as transfer from ED with family at bedside. Oriented to room and plan of care. Primary Nurse Gini Sandhoff, RN and Eugene Pierre RN performed a dual skin assessment on this patient No impairment noted, though left second toe is darker in color than other toes and surrounding skin and all toes are tender to touch. Weak yet palpable peripheral pulses. Granddaughter is requesting colchicine for gout and insisting that she has to have it to prevent gout- though pt only takes prn. Vinny score is 14    1930 spoke with ED DR and discussed colchicine, decided against adm r/t CKD with creatinine of 2.3. Family made aware.     Admission Date 6/17/2017   Admission Diagnosis Pulmonary edema  NSTEMI (non-ST elevated myocardial infarction) ([facility] [address]   Consults IP CONSULT TO CARDIOLOGY  IP CONSULT TO PULMONOLOGY        Consults   []PT   []OT   []Speech   []Case Management      [] Palliative      Cardiac Monitoring Order   []Yes   []No     IV drips   []Yes    Drip:                            Dose:  Drip:                            Dose:  Drip:                            Dose:   [x]No     GI Prophylaxis   []Yes   []No         DVT Prophylaxis   SCDs:             Jed stockings:         [x] Medication   []Contraindicated   []None      Activity Level           Purposeful Rounding every 1-2 hour? [x]Yes   Thomas Score  Total Score: 3   Bed Alarm (If score 3 or >)   [x]Yes   [] Refused (See signed refusal form in chart)   Vinny Score  Vinny Score: 14   Vinny Score (if score 14 or less)   []PMT consult   []Wound Care consult      []Specialty bed   [] Nutrition consult          Needs prior to discharge:   Home O2 required:    [x]Yes   []No    If yes, how much O2 required? Other:    Last Bowel Movement:        Influenza Vaccine          Pneumonia Vaccine           Diet Active Orders   Diet    DIET CLEAR LIQUID      LDAs               Peripheral IV 06/17/17 Right Antecubital (Active)   Site Assessment Clean, dry, & intact 6/17/2017  4:14 AM   Phlebitis Assessment 0 6/17/2017  4:14 AM   Infiltration Assessment 0 6/17/2017  4:14 AM   Dressing Status New 6/17/2017  4:14 AM   Dressing Type 4 X 4 6/17/2017  4:14 AM   Alcohol Cap Used No 6/17/2017  4:14 AM                      Urinary Catheter      Intake & Output   Date 06/16/17 1900 - 06/17/17 0659(Not Admitted) 06/17/17 0700 - 06/18/17 0659   Shift 2598-5483 24 Hour Total 2686-1457 5997-3867 24 Hour Total   I  N  T  A  K  E   P.O.   120  120      P. O.   120  120    Shift Total  (mL/kg)   120  (1.7)  120  (1.7)   O  U  T  P  U  T   Shift Total  (mL/kg)        NET   120  120   Weight (kg)   70.2 70.2 70.2         Readmission Risk Assessment Tool Score Low Risk            9       Total Score        4 More than 1 Admission in calendar year    5 Patient Insurance is Medicare, Medicaid or Self Pay        Criteria that do not apply:    Relationship with PCP    Patient Living Status    Patient Length of Stay > 5    Charlson Comorbidity Score       Expected Length of Stay - - -   Actual Length of Stay 0

## 2017-06-17 NOTE — PROGRESS NOTES
Hospitalist Progress Note    Patient admitted to hospitalist service this morning. Seen and examined at bedside with family present. Chart reviewed and agree with current plan as outlined in H&P. Will continue to follow tomorrow.     Jelena Dan MD

## 2017-06-17 NOTE — CONSULTS
Pulmonology Consult - [facility]    Subjective:   Consult Note: 6/17/2017 2:09 PM    This patient has been seen and evaluated at the request of Dr. Byron Shin for acute respiratory failure and distress. Patient is a 80 y.o. female      HPI Comments:    presents  from [facility] to the ED with cc of sudden onset SOB and chest pain\SpO2 80's at [facility]   non-rebreather mask on 15L SpO2 to 100%. Pt nods her head yes when asked if the breathing mask provides some relief. Hypertensive 's  Placed on BiPAP  CXR consistent with pulmonary edema  Current on NC O2  arousable but not interactive  DNR. Past Medical History:   Diagnosis Date    Angina, class II ([address] 3/5/2013    ARF (acute renal failure) ([facility] [address] 12/19/2010    Atrial fibrillation ([facility] [address] 7/28/2014    Bleeding per rectum 11/6/2009    Coronary atherosclerosis of native coronary artery 5/7/2011    DJD (degenerative joint disease)     DNR (do not resuscitate) 11/29/2011    Gout 11/6/2009    High cholesterol 11/6/2009    Hypertension     IHD (ischemic heart disease) 11/6/2009    Kidney disease, chronic, stage III (moderate, EGFR 30-59 ml/min) 11/6/2009    Kidney disease, chronic, stage III (moderate, EGFR 30-59 ml/min) 11/6/2009    Obesity     TIA (transient ischemic attack) 1/14/2011      Past Surgical History:   Procedure Laterality Date    COLONOSCOPY  4/11/2005     Dr. Carol Hernandez HX CHOLECYSTECTOMY  11/94    [facility] [address] [place], [place] [address]      Prior to Admission medications    Medication Sig Start Date End Date Taking? Authorizing Provider   colchicine 0.6 mg tablet Take 0.6 mg by mouth daily as needed (gout). Historical Provider   isosorbide mononitrate ER (IMDUR) 30 mg tablet Take 30 mg by mouth daily.     Historical Provider   furosemide (LASIX) 20 mg tablet Take 20 mg by mouth two (2) times a day.    Historical Provider   loperamide (IMODIUM) 2 mg capsule Take 2 mg by mouth every six (6) hours as needed for Diarrhea. Historical Provider   POTASSIUM CHLORIDE SR 10 MEQ TAB Take 1 Cap by mouth two (2) times a day. Historical Provider   pravastatin (PRAVACHOL) 20 mg tablet Take 20 mg by mouth nightly. Indications: hypercholesterolemia    Historical Provider   traMADol (ULTRAM) 50 mg tablet Take 50 mg by mouth every eight (8) hours as needed for Pain. Historical Provider   cholecalciferol (VITAMIN D3) 1,000 unit tablet Take 1,000 Units by mouth daily. Historical Provider   carvedilol (COREG) 12.5 mg tablet TAKE 1 AND 1/2 TABLETS BY MOUTH TWICE DAILY WITH MEALS 6/20/16   Miguel Ray MD   clopidogrel (PLAVIX) 75 mg tablet TAKE 1 TABLET BY MOUTH DAILY. 12/11/15   Miguel Ray MD   aspirin delayed-release 81 mg tablet Take 81 mg by mouth daily. Historical Provider   magnesium chloride (MAG DELAY) 64 mg tablet Take 64 mg by mouth daily.     Historical Provider     No Known Allergies   Social History   Substance Use Topics    Smoking status: Never Smoker    Smokeless tobacco: Never Used    Alcohol use No      Family History   Problem Relation Age of Onset    Stroke Other     Hypertension Other     Heart Disease Other         Current Facility-Administered Medications   Medication Dose Route Frequency    [START ON 6/18/2017] aspirin delayed-release tablet 81 mg  81 mg Oral DAILY    carvedilol (COREG) tablet 12.5 mg  12.5 mg Oral BID WITH MEALS    cholecalciferol (VITAMIN D3) tablet 1,000 Units  1,000 Units Oral DAILY    clopidogrel (PLAVIX) tablet 75 mg  75 mg Oral DAILY    potassium chloride SR (KLOR-CON 10) tablet 10 mEq  10 mEq Oral BID    pravastatin (PRAVACHOL) tablet 20 mg  20 mg Oral QHS    traMADol (ULTRAM) tablet 50 mg  50 mg Oral Q8H PRN    sodium chloride (NS) flush 5-10 mL  5-10 mL IntraVENous Q8H    sodium chloride (NS) flush 5-10 mL  5-10 mL IntraVENous PRN    acetaminophen (TYLENOL) tablet 650 mg  650 mg Oral Q4H PRN    ondansetron (ZOFRAN) injection 4 mg  4 mg IntraVENous Q4H PRN    furosemide (LASIX) injection 40 mg  40 mg IntraVENous BID    nitroglycerin (NITROBID) 2 % ointment 1 Inch  1 Inch Topical Q6H    enoxaparin (LOVENOX) 70 mg  70 mg SubCUTAneous Q24H     Current Outpatient Prescriptions   Medication Sig    colchicine 0.6 mg tablet Take 0.6 mg by mouth daily as needed (gout).  isosorbide mononitrate ER (IMDUR) 30 mg tablet Take 30 mg by mouth daily.  furosemide (LASIX) 20 mg tablet Take 20 mg by mouth two (2) times a day.  loperamide (IMODIUM) 2 mg capsule Take 2 mg by mouth every six (6) hours as needed for Diarrhea.  POTASSIUM CHLORIDE SR 10 MEQ TAB Take 1 Cap by mouth two (2) times a day.  pravastatin (PRAVACHOL) 20 mg tablet Take 20 mg by mouth nightly. Indications: hypercholesterolemia    traMADol (ULTRAM) 50 mg tablet Take 50 mg by mouth every eight (8) hours as needed for Pain.  cholecalciferol (VITAMIN D3) 1,000 unit tablet Take 1,000 Units by mouth daily.  carvedilol (COREG) 12.5 mg tablet TAKE 1 AND 1/2 TABLETS BY MOUTH TWICE DAILY WITH MEALS    clopidogrel (PLAVIX) 75 mg tablet TAKE 1 TABLET BY MOUTH DAILY.  aspirin delayed-release 81 mg tablet Take 81 mg by mouth daily.  magnesium chloride (MAG DELAY) 64 mg tablet Take 64 mg by mouth daily.        Review of Systems:  Constitutional HEENT Cardiovascular Pulmonary Gastrointestinal Genitourinary Musculoskeletal Integument Neurologic Endocrinologic Hematologic ROS unobtainable except for findings above    Objective:   Vital Signs:    Visit Vitals    /69    Pulse (!) 54    Temp 99 °F (37.2 °C)    Resp 16    Wt 68.3 kg (150 lb 9.6 oz)    SpO2 100%    BMI 26.68 kg/m2       O2 Device: Nasal cannula   O2 Flow Rate (L/min): 2 l/min   Temp (24hrs), Av °F (37.2 °C), Min:99 °F (37.2 °C), Max:99 °F (37.2 °C)       Intake/Output:   Last shift:       0701 - 06/17 1900  In: 120 [P.O.:120]  Out: -   Last 3 shifts:      Intake/Output Summary (Last 24 hours) at 06/17/17 1409  Last data filed at 06/17/17 1200   Gross per 24 hour   Intake              120 ml   Output                0 ml   Net              120 ml       Physical Exam:   General:  Decreased mentation    Head:  Normocephalic, atraumatic    Eyes:  Sclera non injected Conjunctivae. PERRL, EOMs intact. Nose: Nares normal. Septum midline. Mucosa normal. No drainage or sinus tenderness. Mouth: Moist mucus membranes  Poor Dentition             Neck: Supple, symmetrical, trachea midline, no adenopathy,   No stridor   Back:   Not examined   Lungs:   No acessory muscle use, wheeze, rhonchi, rales       Heart:  irregular rate and rhythm, S1, S2 normal, no murmur, click, rub or gallop. Abdomen:   Soft, non-tender. Bowel sounds normal. No masses, tenderness, guarding rebound No organomegaly. Extremities: no cyanosis  edema clubbing       Skin: Warm dry.  No rashes or lesions   Lymph nodes: Cervical, supraclavicular, and axillary nodes normal.   Neurologic: arousable moves all extremities weakly on command       Data Review     Recent Results (from the past 24 hour(s))   BLOOD GAS, ARTERIAL    Collection Time: 06/17/17  3:53 AM   Result Value Ref Range    pH 7.39 7.35 - 7.45      PCO2 32 (L) 35.0 - 45.0 mmHg    PO2 399 (H) 80 - 100 mmHg    O2  (H) 92 - 97 %    BICARBONATE 19 (L) 22 - 26 mmol/L    BASE DEFICIT 5.1 mmol/L    O2 METHOD BIPAP      FIO2 100 %    MODE BIPAP      SET RATE 4      IPAP/PIP 12.0      EPAP/CPAP/PEEP 5.0      Sample source ARTERIAL      SITE LEFT RADIAL      WILLI'S TEST YES     CBC WITH AUTOMATED DIFF    Collection Time: 06/17/17  4:09 AM   Result Value Ref Range    WBC 4.5 3.6 - 11.0 K/uL    RBC 3.70 (L) 3.80 - 5.20 M/uL    HGB 9.6 (L) 11.5 - 16.0 g/dL    HCT 30.4 (L) 35.0 - 47.0 %    MCV 82.2 80.0 - 99.0 FL    MCH 25.9 (L) 26.0 - 34.0 PG    MCHC 31.6 30.0 - 36.5 g/dL    RDW 15.9 (H) 11.5 - 14.5 %    PLATELET 242 079 - 549 K/uL    NEUTROPHILS 44 32 - 75 %    LYMPHOCYTES 45 12 - 49 %    MONOCYTES 9 5 - 13 %    EOSINOPHILS 2 0 - 7 %    BASOPHILS 0 0 - 1 %    ABS. NEUTROPHILS 2.0 1.8 - 8.0 K/UL    ABS. LYMPHOCYTES 2.0 0.8 - 3.5 K/UL    ABS. MONOCYTES 0.4 0.0 - 1.0 K/UL    ABS. EOSINOPHILS 0.1 0.0 - 0.4 K/UL    ABS. BASOPHILS 0.0 0.0 - 0.1 K/UL   LACTIC ACID, PLASMA    Collection Time: 06/17/17  4:09 AM   Result Value Ref Range    Lactic acid 3.9 (HH) 0.4 - 2.0 MMOL/L   TROPONIN I    Collection Time: 06/17/17  4:09 AM   Result Value Ref Range    Troponin-I, Qt. 3.24 (H) <0.05 ng/mL   PRO-BNP    Collection Time: 06/17/17  4:09 AM   Result Value Ref Range    NT pro-BNP >53047 (H) 0 - 450 PG/ML   CK W/ CKMB & INDEX    Collection Time: 06/17/17  4:09 AM   Result Value Ref Range    CK 70 26 - 192 U/L    CK - MB 1.2 <3.6 NG/ML    CK-MB Index 1.7 0 - 2.5     METABOLIC PANEL, COMPREHENSIVE    Collection Time: 06/17/17  4:09 AM   Result Value Ref Range    Sodium 139 136 - 145 mmol/L    Potassium 4.5 3.5 - 5.1 mmol/L    Chloride 108 97 - 108 mmol/L    CO2 20 (L) 21 - 32 mmol/L    Anion gap 11 5 - 15 mmol/L    Glucose 139 (H) 65 - 100 mg/dL    BUN 39 (H) 6 - 20 MG/DL    Creatinine 2.30 (H) 0.55 - 1.02 MG/DL    BUN/Creatinine ratio 17 12 - 20      GFR est AA 23 (L) >60 ml/min/1.73m2    GFR est non-AA 19 (L) >60 ml/min/1.73m2    Calcium 8.3 (L) 8.5 - 10.1 MG/DL    Bilirubin, total 0.4 0.2 - 1.0 MG/DL    ALT (SGPT) 15 12 - 78 U/L    AST (SGOT) 23 15 - 37 U/L    Alk.  phosphatase 94 45 - 117 U/L    Protein, total 9.1 (H) 6.4 - 8.2 g/dL    Albumin 2.5 (L) 3.5 - 5.0 g/dL    Globulin 6.6 (H) 2.0 - 4.0 g/dL    A-G Ratio 0.4 (L) 1.1 - 2.2     EKG, 12 LEAD, INITIAL    Collection Time: 06/17/17  5:00 AM   Result Value Ref Range    Ventricular Rate 68 BPM    Atrial Rate 77 BPM    QRS Duration 64 ms    Q-T Interval 466 ms    QTC Calculation (Bezet) 495 ms    Calculated R Axis -2 degrees    Calculated T Axis -166 degrees Diagnosis       Atrial fibrillation  Inferior infarct (cited on or before 21-AUG-2016)  Anterior infarct , age undetermined  ST & T wave abnormality, consider lateral ischemia or digitalis effect  Abnormal ECG  When compared with ECG of 10-CAMILLE-2017 13:37,  T wave inversion now evident in Anterior leads  QT has lengthened     LACTIC ACID, PLASMA    Collection Time: 06/17/17  8:17 AM   Result Value Ref Range    Lactic acid 2.8 (HH) 0.4 - 2.0 MMOL/L   LACTIC ACID, PLASMA    Collection Time: 06/17/17 12:15 PM   Result Value Ref Range    Lactic acid 1.6 0.4 - 2.0 MMOL/L       Chest X-Ray: pulmonary edema pattern    Assessment:   Principal Problem:    NSTEMI (non-ST elevated myocardial infarction) ([facility] [address] (6/17/2017)    Pulmonary edema (6/17/2017)    Respiratory failure   BiPAP support  DNR  Medical problems as above          ·     Recommendations:     1. Diuretics  2. BiPAP support  3. DVT ppx  4. Cardiology recommendations  5.  Observe DNR code status      Available as needed  Discussed with family    Vasile Kirkland MD

## 2017-06-17 NOTE — IP AVS SNAPSHOT
[address] [facility] 
[phone] Patient: Bright Storm MRN: [id number] :8/15/1911 You are allergic to the following No active allergies Recent Documentation Height Weight Breastfeeding? BMI OB Status Smoking Status 1.6 m 64.3 kg No 25.1 kg/m2 Postmenopausal Never Smoker Emergency Contacts Name Discharge Info Relation Home Work Mobile Aylin Nichols DISCHARGE CAREGIVER [3] Other Relative [6] [phone] About your hospitalization You were admitted on:  2017 You last received care in the:  [facility] [place] [facility] C[facility] You were discharged on:  2017 Unit phone number:  [phone] Why you were hospitalized Your primary diagnosis was:  Nstemi (Non-St Elevated Myocardial Infarction) (Hcc) Your diagnoses also included:  Pulmonary Edema, Ischemic Cardiomyopathy, Atrial Fibrillation (Hcc) Providers Seen During Your Hospitalizations Provider Role Specialty Primary office phone Osmin Whitley MD Attending Provider Emergency Medicine [phone] Alivia Coreas MD Attending Provider Hospitalist [phone] Denver Rodrigues MD Attending Provider Hospitalist [phone] Your Primary Care Physician (PCP) Primary Care Physician Office Phone Office Fax Akila Jethro [phone] Follow-up Information Follow up With Details Comments Contact Info [facility] will follow you for short term rehab.  [phone] Josiah Lu MD In 2 weeks [facility] will schedule follow up appointments [address] [facility] 
[phone]  [facility] Kami In 1 week [facility] will set up patient's follow up appointment. [phone] [address] [facility] First Althea Mathews [address] Current Discharge Medication List  
  
START taking these medications Dose & Instructions Dispensing Information Comments Morning Noon Evening Bedtime  
 acetaminophen 325 mg tablet Commonly known as:  TYLENOL Your last dose was: Your next dose is:    
   
   
 Dose:  650 mg Take 2 Tabs by mouth every four (4) hours as needed. Quantity:  40 Tab Refills:  0  
     
   
   
   
  
 potassium chloride SR 10 mEq tablet Commonly known as:  KLOR-CON 10 Replaces:  POTASSIUM CHLORIDE SR 10 MEQ TAB Your last dose was: Your next dose is:    
   
   
 Dose:  10 mEq Take 1 Tab by mouth daily. Quantity:  30 Tab Refills:  1 CONTINUE these medications which have CHANGED Dose & Instructions Dispensing Information Comments Morning Noon Evening Bedtime  
 furosemide 40 mg tablet Commonly known as:  LASIX What changed:   
- medication strength 
- how much to take - when to take this Your last dose was: Your next dose is:    
   
   
 Dose:  40 mg Take 1 Tab by mouth daily. Quantity:  30 Tab Refills:  1 CONTINUE these medications which have NOT CHANGED Dose & Instructions Dispensing Information Comments Morning Noon Evening Bedtime  
 aspirin delayed-release 81 mg tablet Your last dose was: Your next dose is:    
   
   
 Dose:  81 mg Take 81 mg by mouth daily. Refills:  0  
     
   
   
   
  
 carvedilol 12.5 mg tablet Commonly known as:  Akila Limon Your last dose was: Your next dose is: TAKE 1 AND 1/2 TABLETS BY MOUTH TWICE DAILY WITH MEALS Quantity:  90 Tab Refills:  5  
     
   
   
   
  
 clopidogrel 75 mg Tab Commonly known as:  PLAVIX Your last dose was: Your next dose is: TAKE 1 TABLET BY MOUTH DAILY. Quantity:  30 Tab Refills:  11  
     
   
   
   
  
 colchicine 0.6 mg tablet Your last dose was: Your next dose is:    
   
   
 Dose:  0.6 mg Take 0.6 mg by mouth daily as needed (gout). Refills:  0  
     
   
   
   
  
 isosorbide mononitrate ER 30 mg tablet Commonly known as:  IMDUR Your last dose was: Your next dose is:    
   
   
 Dose:  30 mg Take 30 mg by mouth daily. Refills:  0  
     
   
   
   
  
 loperamide 2 mg capsule Commonly known as:  IMODIUM Your last dose was: Your next dose is:    
   
   
 Dose:  2 mg Take 2 mg by mouth every six (6) hours as needed for Diarrhea. Refills:  0  
     
   
   
   
  
 magnesium chloride 64 mg tablet Commonly known as:  MAG DELAY Your last dose was: Your next dose is:    
   
   
 Dose:  64 mg Take 64 mg by mouth daily. Refills:  0  
     
   
   
   
  
 pravastatin 20 mg tablet Commonly known as:  PRAVACHOL Your last dose was: Your next dose is:    
   
   
 Dose:  20 mg Take 20 mg by mouth nightly. Indications: hypercholesterolemia Refills:  0  
     
   
   
   
  
 traMADol 50 mg tablet Commonly known as:  ULTRAM  
   
Your last dose was: Your next dose is:    
   
   
 Dose:  50 mg Take 1 Tab by mouth every eight (8) hours as needed for Pain. Max Daily Amount: 150 mg.  
 Quantity:  40 Tab Refills:  0  
     
   
   
   
  
 VITAMIN D3 1,000 unit tablet Generic drug:  cholecalciferol Your last dose was: Your next dose is:    
   
   
 Dose:  1000 Units Take 1,000 Units by mouth daily. Refills:  0 STOP taking these medications POTASSIUM CHLORIDE SR 10 MEQ TAB Replaced by:  potassium chloride SR 10 mEq tablet Where to Get Your Medications Information on where to get these meds will be given to you by the nurse or doctor. ! Ask your nurse or doctor about these medications  
  acetaminophen 325 mg tablet  
 furosemide 40 mg tablet potassium chloride SR 10 mEq tablet  
 traMADol 50 mg tablet Discharge Instructions HOSPITALIST DISCHARGE INSTRUCTIONS 
 
NAME: Ta Arauz :  8/15/1911 MRN:  [id number] Date/Time:  2017 7:53 AM 
 
ADMIT DATE: 2017 DISCHARGE DATE: 2017 Attending Physician: Rose Abdullahi MD 
 
DISCHARGE DIAGNOSIS: 
Respiratory Failure, Pulmonary Edema, NSTEMI, CHF, Chronic A. Fib, CKD, Anemia, Malnutrition, Hyperlipidemia, H/O CVA Medications: Per above medication reconciliation. Pain Management: per above medications Recommended diet: Cardiac Diet Recommended activity: Activity as tolerated Wound care: None Indwelling devices:  None Supplemental Oxygen: 2 LNC,  wean as tolerated Required Lab work: Akash Mendez weekly Glucose management:  None Code status: DNR 
 
 
CHF specific discharge instructions Weight: 
· Daily weights, Notify your Doctor if Wt gain of 3 lb in a day or 5 lb in a week · Daily Intake & Output Diet : 
· Low salt cardiac diet · Fluid restriction of 1500 ml daily Outside physician follow up: Follow-up Information Follow up With Details Comments Contact Info [facility] will follow you for short term rehab.  [phone] Jesika Landin ([facility])   [address] [facility] 
[phone] Jenni Rojas MD   [address] [facility] 
[phone] F/U PCP 
F/U Cardiology Skilled nursing facility/ SNF MD responsible for above on discharge. Information obtained by : 
I understand that if any problems occur once I am at home I am to contact my physician. I understand and acknowledge receipt of the instructions indicated above. Physician's or R.N.'s Signature                                                                  Date/Time Patient or Repres Discharge Orders None [email or web address] Announcement We are excited to announce that we are making your provider's discharge notes available to you in [employer]. You will see these notes when they are completed and signed by the physician that discharged you from your recent hospital stay. If you have any questions or concerns about any information you see in [employer], please call the Health Information Department where you were seen or reach out to your Primary Care Provider for more information about your plan of care. Introducing [facility]! Snehakyung Kumar introduces [employer] patient portal. Now you can access parts of your medical record, email your doctor's office, and request medication refills online. 1. In your internet browser, go to [email or web address] [employer] 2. Click on the First Time User? Click Here link in the Sign In box. You will see the New Member Sign Up page. 3. Enter your [employer] Access Code exactly as it appears below. You will not need to use this code after youve completed the sign-up process. If you do not sign up before the expiration date, you must request a new code. · [employer] Access Code: [email or web address] Expires: 7/27/2017 11:56 AM 
 
4. Enter the last four digits of your Social Security Number (xxxx) and Date of Birth (mm/dd/yyyy) as indicated and click Submit. You will be taken to the next sign-up page. 5. Create a [employer] ID. This will be your [employer] login ID and cannot be changed, so think of one that is secure and easy to remember. 6. Create a [employer] password. You can change your password at any time. 7. Enter your Password Reset Question and Answer.  This can be used at a later time if you forget your password. 8. Enter your e-mail address. You will receive e-mail notification when new information is available in [address] 9. Click Sign Up. You can now view and download portions of your medical record. 10. Click the Download Summary menu link to download a portable copy of your medical information. If you have questions, please visit the Frequently Asked Questions section of the [employer] website. Remember, [employer] is NOT to be used for urgent needs. For medical emergencies, dial 911. Now available from your iPhone and Android! General Information Please provide this summary of care documentation to your next provider. Patient Signature:  ____________________________________________________________ Date:  ____________________________________________________________  
  
Fatimah Carla Provider Signature:  ____________________________________________________________ Date:  ____________________________________________________________

## 2017-06-17 NOTE — ROUTINE PROCESS
TRANSFER - OUT REPORT:    Verbal report given to Twin Canseco RN(name) on Tiffany Cao  being transferred to [facility](unit) for routine progression of care       Report consisted of patients Situation, Background, Assessment and   Recommendations(SBAR). Information from the following report(s) SBAR, ED Summary, [facility] and Recent Results was reviewed with the receiving nurse. Lines:   Peripheral IV 06/17/17 Right Antecubital (Active)   Site Assessment Clean, dry, & intact 6/17/2017  4:14 AM   Phlebitis Assessment 0 6/17/2017  4:14 AM   Infiltration Assessment 0 6/17/2017  4:14 AM   Dressing Status New 6/17/2017  4:14 AM   Dressing Type 4 X 4 6/17/2017  4:14 AM   Alcohol Cap Used No 6/17/2017  4:14 AM        Opportunity for questions and clarification was provided.       Patient transported with:   Registered Nurse

## 2017-06-17 NOTE — H&P
Hospitalist Admission Note    NAME: Angi Naidu   :  8/15/1911   MRN:  [id number]     Date/Time:  2017 5:37 AM    Patient PCP: Jayashree Garcia MD  ________________________________________________________________________    My assessment of this patient's clinical condition and my plan of care is as follows. Assessment / Plan:  NSTEMI / CAD   Not a procedural candidate due to age and pts wishes. Medical management  therapeutic Lovenox  Will consult cardiology to help with management   Change Imdur to NTP   Cont home Plavix/ASA   Cont BB: coreg   Cycle troponin   ECHO    Acute hypoxic respiratory failure due to acute pulmonary edema due to acute on chronic HF diastolic dysfunction EF 69%  Will aggressively diureses with lasix IV   Close monitoring of fluid status with strict I&O, daily weight  ECHO  Continue BIPAP, wean as tolerating, pulmonary for BIPAP management   Closely monitor electrolytes with aggressive diureses, cont home daily K supplement    Diet: CLD     Lactic acidosis   Likely due to above, due to poor perfusion state   Monitor closely     Chronic Afib  HR initially in RVR, improved once on BIPAP  cont home BB   Not a candidate for AG due to age     CKD stage III  Cr at baseline, monitor closely with aggressive diureses. Avoid nephrotoxic drugs, adjust all meds to GFR. Anemia of chronic disease   Hb stable  Monitor closely     Moderate protein calory malnutrition, albumin 2.5   Hyperlipidemia, cont home statin   H/o stroke, cont ASA/Plavix           Code Status: DDNR on file, confirmed with MPOA   MPOA: rayo Roche Toisharee christianson     DVT Prophylaxis: Lovenox   GI Prophylaxis: not indicated    Baseline:pt was very functional and was living alone at [facility] till 2017 when she had a stroke; she is currently at [address]:   CHIEF COMPLAINT: dyspnea     HISTORY OF PRESENT ILLNESS:     Author Stan is a 80 y.o.    female who presents with above complaint. Pt is currently on BIPAP and cannot contribute to the history. History was obtained from granddtr at bedside. Pt was very functional and was living alone at [facility] till 03/2017 when she had a stroke. She is currently at [facility]. She has functional significant declining since her stroke. She is mostly WC bound at this time. She was supposed to be DC from SNF yesterday to [facility]. Pt was c/o chest pain the day before yesterday which then resolved. Today pt started with sudden dyspnea. On EMS arrival pt was in severe respiratory distress and hypoxic with O2 at low 80th. She was placed on NRB 15L by EMS. On arrival to ED pt continue to be in significant respiratory distress. She was placed on BIPAP. Vs: 99 °F (37.2 °C) - 121 - 160/114 - 25 - 100% BIPAP     We were asked to admit for work up and evaluation of the above problems.      Past Medical History:   Diagnosis Date    Angina, class II ([address] 3/5/2013    ARF (acute renal failure) ([address] 12/19/2010    Atrial fibrillation ([address] 7/28/2014    Bleeding per rectum 11/6/2009    Coronary atherosclerosis of native coronary artery 5/7/2011    DJD (degenerative joint disease)     DNR (do not resuscitate) 11/29/2011    Gout 11/6/2009    High cholesterol 11/6/2009    Hypertension     IHD (ischemic heart disease) 11/6/2009    Kidney disease, chronic, stage III (moderate, EGFR 30-59 ml/min) 11/6/2009    Kidney disease, chronic, stage III (moderate, EGFR 30-59 ml/min) 11/6/2009    Obesity     TIA (transient ischemic attack) 1/14/2011        Past Surgical History:   Procedure Laterality Date    COLONOSCOPY  4/11/2005     Dr. Branch Course HX CHOLECYSTECTOMY  11/94    HX HEENT      [address]       Social History   Substance Use Topics    Smoking status: Never Smoker    Smokeless tobacco: Never Used    Alcohol use No        Family History   Problem Relation Age of Onset    Stroke Other     Hypertension Other     Heart Disease Other      No Known Allergies     Prior to Admission medications    Medication Sig Start Date End Date Taking? Authorizing Provider   colchicine 0.6 mg tablet Take 0.6 mg by mouth daily as needed (gout). Historical Provider   isosorbide mononitrate ER (IMDUR) 30 mg tablet Take 30 mg by mouth daily. Historical Provider   furosemide (LASIX) 20 mg tablet Take 20 mg by mouth two (2) times a day. Historical Provider   loperamide (IMODIUM) 2 mg capsule Take 2 mg by mouth every six (6) hours as needed for Diarrhea. Historical Provider   POTASSIUM CHLORIDE SR 10 MEQ TAB Take 1 Cap by mouth two (2) times a day. Historical Provider   pravastatin (PRAVACHOL) 20 mg tablet Take 20 mg by mouth nightly. Indications: hypercholesterolemia    Historical Provider   traMADol (ULTRAM) 50 mg tablet Take 50 mg by mouth every eight (8) hours as needed for Pain. Historical Provider   cholecalciferol (VITAMIN D3) 1,000 unit tablet Take 1,000 Units by mouth daily. Historical Provider   carvedilol (COREG) 12.5 mg tablet TAKE 1 AND 1/2 TABLETS BY MOUTH TWICE DAILY WITH MEALS 6/20/16   Scott Rothman MD   clopidogrel (PLAVIX) 75 mg tablet TAKE 1 TABLET BY MOUTH DAILY. 12/11/15   Scott Rothman MD   aspirin delayed-release 81 mg tablet Take 81 mg by mouth daily. Historical Provider   magnesium chloride (MAG DELAY) 64 mg tablet Take 64 mg by mouth daily. Historical Provider       REVIEW OF SYSTEMS:     I am not able to complete the review of systems because:    The patient is intubated and sedated    The patient has altered mental status due to his acute medical problems    The patient has baseline aphasia from prior stroke(s)    The patient has baseline dementia and is not reliable historian    The patient is in acute medical distress and unable to provide information           Total of 12 systems reviewed as follows:       POSITIVE= underlined text  Negative = text not underlined  General:  fever, chills, sweats, generalized weakness, weight loss/gain,      loss of appetite   Eyes:    blurred vision, eye pain, loss of vision, double vision  ENT:    rhinorrhea, pharyngitis   Respiratory:   cough, sputum production, SOB, MCNAMARA, wheezing, pleuritic pain   Cardiology:   chest pain, palpitations, orthopnea, PND, edema, syncope   Gastrointestinal:  abdominal pain , N/V, diarrhea, dysphagia, constipation, bleeding   Genitourinary:  frequency, urgency, dysuria, hematuria, incontinence   Muskuloskeletal :  arthralgia, myalgia, back pain  Hematology:  easy bruising, nose or gum bleeding, lymphadenopathy   Dermatological: rash, ulceration, pruritis, color change / jaundice  Endocrine:   hot flashes or polydipsia   Neurological:  headache, dizziness, confusion, focal weakness, paresthesia,     Speech difficulties, memory loss, gait difficulty  Psychological: Feelings of anxiety, depression, agitation    Objective:   VITALS:    Visit Vitals    /83    Pulse 73    Temp 99 °F (37.2 °C)    Resp 21    SpO2 100%       PHYSICAL EXAM:    General:    Alert, cooperative, no distress, appears stated age. On BIPAP. HEENT: Atraumatic, anicteric sclerae, pink conjunctivae     No oral ulcers, mucosa moist, throat clear, dentition fair  Neck:  Supple, symmetrical,  thyroid: non tender  Lungs:   Diminished BS bilaterally. No Wheezing or Rhonchi. + rales. Chest wall:  No tenderness  No Accessory muscle use. Heart:   Regular  rhythm,  No  murmur   No edema  Abdomen:   Soft, non-tender. Not distended. Bowel sounds normal  Extremities: No cyanosis. No clubbing,      Skin turgor normal, Capillary refill normal, Radial dial pulse 2+  Skin:     Not pale. Not Jaundiced  No rashes   Psych:  Good insight. Not depressed. Not anxious or agitated. Neurologic: EOMs intact. No facial asymmetry. No aphasia or slurred speech.  Symmetrical strength, Sensation grossly intact. Alert and oriented X 4.     _______________________________________________________________________  Care Plan discussed with:    Comments   Patient y    Family  y granddtr at bedside    RN y    Care Manager                    Consultant:  gerson ED provider    _______________________________________________________________________  Expected  Disposition:   Home with Family    HH/PT/OT/RN    SNF/LTC y   [de-identified]    ________________________________________________________________________  TOTAL TIME:   Minutes    Critical Care Provided  61   Minutes non procedure based  Patient is critically ill and at high risk for further deterioration. Complex decision making was performed which includes reviewing the patient's past medical records, current laboratory results, and actual Xray films. I have also discussed this case with the involved ED physician and with Pulmonary Critical care. Critical Care:  The reason for providing this level of medical care for this critically ill patient was due a critical illness that impaired one or more vital organ systems such that there was a high probability of imminent or life threatening deterioration in the patients condition. This care involved high complexity decision making to assess, manipulate, and support vital system functions, to treat this degreee vital organ system failure and to prevent further life threatening deterioration of the patients condition.    I was immediately available to the patient. Comments    y Reviewed previous records    y Discussion with patient and/or family and questions answered       ________________________________________________________________________  Signed: Cesar Layne MD    Procedures: see electronic medical records for all procedures/Xrays and details which were not copied into this note but were reviewed prior to creation of Plan.     LAB DATA REVIEWED:    Recent Results (from the past 24 hour(s))   BLOOD GAS, ARTERIAL    Collection Time: 06/17/17  3:53 AM   Result Value Ref Range    pH 7.39 7.35 - 7.45      PCO2 32 (L) 35.0 - 45.0 mmHg    PO2 399 (H) 80 - 100 mmHg    O2  (H) 92 - 97 %    BICARBONATE 19 (L) 22 - 26 mmol/L    BASE DEFICIT 5.1 mmol/L    O2 METHOD BIPAP      FIO2 100 %    MODE BIPAP      SET RATE 4      IPAP/PIP 12.0      EPAP/CPAP/PEEP 5.0      Sample source ARTERIAL      SITE LEFT RADIAL      WILLI'S TEST YES     CBC WITH AUTOMATED DIFF    Collection Time: 06/17/17  4:09 AM   Result Value Ref Range    WBC 4.5 3.6 - 11.0 K/uL    RBC 3.70 (L) 3.80 - 5.20 M/uL    HGB 9.6 (L) 11.5 - 16.0 g/dL    HCT 30.4 (L) 35.0 - 47.0 %    MCV 82.2 80.0 - 99.0 FL    MCH 25.9 (L) 26.0 - 34.0 PG    MCHC 31.6 30.0 - 36.5 g/dL    RDW 15.9 (H) 11.5 - 14.5 %    PLATELET 333 267 - 309 K/uL    NEUTROPHILS 44 32 - 75 %    LYMPHOCYTES 45 12 - 49 %    MONOCYTES 9 5 - 13 %    EOSINOPHILS 2 0 - 7 %    BASOPHILS 0 0 - 1 %    ABS. NEUTROPHILS 2.0 1.8 - 8.0 K/UL    ABS. LYMPHOCYTES 2.0 0.8 - 3.5 K/UL    ABS. MONOCYTES 0.4 0.0 - 1.0 K/UL    ABS. EOSINOPHILS 0.1 0.0 - 0.4 K/UL    ABS.  BASOPHILS 0.0 0.0 - 0.1 K/UL   LACTIC ACID, PLASMA    Collection Time: 06/17/17  4:09 AM   Result Value Ref Range    Lactic acid 3.9 (HH) 0.4 - 2.0 MMOL/L   TROPONIN I    Collection Time: 06/17/17  4:09 AM   Result Value Ref Range    Troponin-I, Qt. 3.24 (H) <0.05 ng/mL   PRO-BNP    Collection Time: 06/17/17  4:09 AM   Result Value Ref Range    NT pro-BNP >20118 (H) 0 - 450 PG/ML   CK W/ CKMB & INDEX    Collection Time: 06/17/17  4:09 AM   Result Value Ref Range    CK 70 26 - 192 U/L    CK - MB 1.2 <3.6 NG/ML    CK-MB Index 1.7 0 - 2.5     METABOLIC PANEL, COMPREHENSIVE    Collection Time: 06/17/17  4:09 AM   Result Value Ref Range    Sodium 139 136 - 145 mmol/L    Potassium 4.5 3.5 - 5.1 mmol/L    Chloride 108 97 - 108 mmol/L    CO2 20 (L) 21 - 32 mmol/L    Anion gap 11 5 - 15 mmol/L    Glucose 139 (H) 65 - 100 mg/dL    BUN 39 (H) 6 - 20 MG/DL Creatinine 2.30 (H) 0.55 - 1.02 MG/DL    BUN/Creatinine ratio 17 12 - 20      GFR est AA 23 (L) >60 ml/min/1.73m2    GFR est non-AA 19 (L) >60 ml/min/1.73m2    Calcium 8.3 (L) 8.5 - 10.1 MG/DL    Bilirubin, total 0.4 0.2 - 1.0 MG/DL    ALT (SGPT) 15 12 - 78 U/L    AST (SGOT) 23 15 - 37 U/L    Alk.  phosphatase 94 45 - 117 U/L    Protein, total 9.1 (H) 6.4 - 8.2 g/dL    Albumin 2.5 (L) 3.5 - 5.0 g/dL    Globulin 6.6 (H) 2.0 - 4.0 g/dL    A-G Ratio 0.4 (L) 1.1 - 2.2

## 2017-06-17 NOTE — ED NOTES
Pt is much more alert and talking, requesting some more Flavio Scott, family remains vigilant bedside, Bedside shift change report given to Osiris Rios RN  (oncoming nurse) by Dangelo Mejia RN  (offgoing nurse). Report included the following information SBAR, Kardex and ED Summary.

## 2017-06-17 NOTE — ED PROVIDER NOTES
HPI Comments: Cheng Magallon is a 80 y.o. female with PMhx significant for gout, HLD, IHD, HTN, angina, TIA, and afib who presents via EMS from [facility] to the ED with cc of sudden onset SOB beginning 6/17/2017. EMS states pt satted in the 80's at [facility] and was given non-rebreather mask on 15L which brought her up to 100%. Pt nods her head yes when asked if the breathing mask provides some relief. EMS notes, however, despite the mask, pt's labor of breathing remained unchanged en route. Per EMS, pt's blood pressure was 191/126 upon arrival. [facility] informed EMS pt c/o of chest pain, however, pt specifically denies any chest pain and cough. Per chart review, pt is DNR. Social: (-) tobacco, (-) EtOH, (-) illicit drug  Surgical: thyroidectomy, cholecystectomy, hysterectomy, colonoscopy, appendectomy     PCP: Nuzhat Nolan MD    Hx is limited at this time secondary to condition of patient. The history is provided by the patient and the EMS personnel.         Past Medical History:   Diagnosis Date    Angina, class II ([address] 3/5/2013    ARF (acute renal failure) ([address] 12/19/2010    Atrial fibrillation ([address] 7/28/2014    Bleeding per rectum 11/6/2009    Coronary atherosclerosis of native coronary artery 5/7/2011    DJD (degenerative joint disease)     DNR (do not resuscitate) 11/29/2011    Gout 11/6/2009    High cholesterol 11/6/2009    Hypertension     IHD (ischemic heart disease) 11/6/2009    Kidney disease, chronic, stage III (moderate, EGFR 30-59 ml/min) 11/6/2009    Kidney disease, chronic, stage III (moderate, EGFR 30-59 ml/min) 11/6/2009    Obesity     TIA (transient ischemic attack) 1/14/2011       Past Surgical History:   Procedure Laterality Date    COLONOSCOPY  4/11/2005     Dr. Medardo Veronica HX CHOLECYSTECTOMY  11/94    HX HEENT      [address]   [address] Family History:   Problem Relation Age of Onset    Stroke Other     Hypertension Other     Heart Disease Other        Social History     Social History    Marital status:      Spouse name: N/A    Number of children: N/A    Years of education: N/A     Occupational History    Not on file. Social History Main Topics    Smoking status: Never Smoker    Smokeless tobacco: Never Used    Alcohol use No    Drug use: No    Sexual activity: No     Other Topics Concern    Not on file     Social History Narrative         ALLERGIES: Review of patient's allergies indicates no known allergies. Review of Systems   Unable to perform ROS: Severe respiratory distress     Patient Vitals for the past 12 hrs:   Temp Pulse Resp BP SpO2   06/17/17 0500 - 73 21 145/83 -   06/17/17 0456 - 63 24 - 100 %   06/17/17 0445 - 68 27 137/79 100 %   06/17/17 0423 - 79 25 - 100 %   06/17/17 0415 - 82 24 (!) 151/103 100 %   06/17/17 0400 - 91 30 (!) 162/125 100 %   06/17/17 0345 - (!) 111 (!) 32 (!) 174/139 100 %   06/17/17 0344 - - - - 97 %   06/17/17 0340 99 °F (37.2 °C) (!) 121 25 (!) 160/114 100 %   06/17/17 0338 - - - (!) 160/114 -            Physical Exam   Constitutional: She is oriented to person, place, and time. She appears well-developed and well-nourished. No distress. HENT:   Head: Normocephalic. Nose: Nose normal.   Mouth/Throat: Oropharynx is clear and moist. No oropharyngeal exudate. Eyes: Conjunctivae are normal. Pupils are equal, round, and reactive to light. No scleral icterus. Neck: Normal range of motion. Neck supple. No JVD present. No tracheal deviation present. No thyromegaly present. Cardiovascular: Regular rhythm and intact distal pulses. Tachycardia present. Exam reveals no gallop and no friction rub. No murmur heard. Pulmonary/Chest: No stridor. She is in respiratory distress. She has no wheezes. She has rales.    Moderate increased work of breathing  Bilateral rales in mid lung fields   Abdominal: Soft. Bowel sounds are normal. She exhibits no distension. There is no tenderness. There is no rebound and no guarding. Musculoskeletal: Normal range of motion. She exhibits no edema. Lymphadenopathy:     She has no cervical adenopathy. Neurological: She is alert and oriented to person, place, and time. No cranial nerve deficit. She exhibits normal muscle tone. Coordination normal.   Skin: Skin is warm and dry. No rash noted. She is not diaphoretic. No erythema. Nursing note and vitals reviewed. MDM  Number of Diagnoses or Management Options  Acute respiratory failure, unspecified whether with hypoxia or hypercapnia ([facility]):   Chronic atrial fibrillation ([facility]):   CKD (chronic kidney disease), unspecified stage:   Congestive heart failure, unspecified congestive heart failure chronicity, unspecified congestive heart failure type [facility]):   NSTEMI (non-ST elevated myocardial infarction) [facility]):   Diagnosis management comments: DDx: CHF, PNA, ACS       Amount and/or Complexity of Data Reviewed  Clinical lab tests: ordered and reviewed  Tests in the radiology section of CPT®: ordered and reviewed  Tests in the medicine section of CPT®: ordered and reviewed  Obtain history from someone other than the patient: yes (EMS)  Review and summarize past medical records: yes  Discuss the patient with other providers: yes (Hospitalist)  Independent visualization of images, tracings, or specimens: yes    Risk of Complications, Morbidity, and/or Mortality  General comments:  Will admit to hospitalist for acute chf/resp failure, nstemi; much improved on BIPAP; pt dnr; pt did not wish to pursue cardiac cath; wished only for medical management; already on plavix; gave 325mg aspirin here and 40mg lasix; hx of chronic a fib; vss; Les Leonard MD      Critical Care  Total time providing critical care: 30-74 minutes    Patient Progress  Patient progress: stable    ED Course       Procedures    PROGRESS NOTE:  3:46 AM  I have reviewed medical records in the EHR, pertinent to patients present condition/presenting problems. Pt was recently admitted to [facility] on 4/218/2017 for acute left CVA and was started on Aspirin and Plavix. Record reveals pt had some altered mental status and a facial droop and was ultimately sent to [address] [place] [address],[address] for rehab. Per record review, pt is DNR. Written by EL Gerard, as dictated by Joanna Cruz MD    PROGRESS NOTE:  4:14 AM  Daughter at bedside. Reviewed pt's advance directive and DNR status. Written by EL Gerard, as dictated by Joanna Cruz MD.    PROGRESS NOTE:  4:19 AM  Pt is much improved on BiPAP. Written by EL Gerard, as dictated by Joanna Cruz MD.    ED EKG interpretation: 05:00  Rhythm: atrial fib; and irregular . Rate (approx.): 68; Axis: normal; LA Interval: n/a; QRS interval: normal ; ST/T wave: non-specific changes; This EKG was interpreted by Joanna Cruz MD,ED Provider. PROGRESS NOTE:  5:30 AM  Had a long discussion with pt and pt's daughter regarding her elevated troponin. She states she does not wish for cardiac cath or any aggressive intervention. Will medically manage at this time. Written by EL Gerard, as dictated by Joanna Cruz MD.    CONSULT NOTE:   5:33 AM  Joanna Cruz MD spoke with Patricio Rondon MD   Specialty: Hospitalist  Discussed pt's hx, disposition, and available diagnostic and imaging results. Reviewed care plans. Consultant will evaluate pt for admission.  She did not feel the need to consult cardiology at this time, since pt did not wish to have any cath/aggressive intervention, and only wished for medical management;   Written by EL Gerard, as dictated by Joanna Cruz MD.      CRITICAL CARE NOTE :    5:54 AM      IMPENDING DETERIORATION -Airway, Respiratory, Cardiovascular and Metabolic    ASSOCIATED RISK FACTORS - Hypoxia, Vascular Compromise and CNS Decompensation    MANAGEMENT- Bedside Assessment and Supervision of Care    INTERPRETATION -  Xrays, Blood Gases, ECG and Blood Pressure    INTERVENTIONS - hemodynamic mngmt and BIPAP    CASE REVIEW - Hospitalist and Medical Sub-Specialist    TREATMENT RESPONSE -Improved    PERFORMED BY - Self        NOTES   :      I have spent 45 minutes of critical care time involved in lab review, consultations with specialist, family decision- making, bedside attention and documentation. During this entire length of time I was immediately available to the patient . Critical Care: The reason for providing this level of medical care for this critically ill patient was due to a critical illness that impaired one or more vital organ systems, such that there was a high probability of imminent or life threatening deterioration in the patient's condition. This care involved high complexity decision making to assess, manipulate, and support vital system functions, to treat this degree of vital organ system failure, and to prevent further life threatening deterioration of the patients condition.   Osmin Whitley MD      LABORATORY TESTS:  Recent Results (from the past 12 hour(s))   BLOOD GAS, ARTERIAL    Collection Time: 06/17/17  3:53 AM   Result Value Ref Range    pH 7.39 7.35 - 7.45      PCO2 32 (L) 35.0 - 45.0 mmHg    PO2 399 (H) 80 - 100 mmHg    O2  (H) 92 - 97 %    BICARBONATE 19 (L) 22 - 26 mmol/L    BASE DEFICIT 5.1 mmol/L    O2 METHOD BIPAP      FIO2 100 %    MODE BIPAP      SET RATE 4      IPAP/PIP 12.0      EPAP/CPAP/PEEP 5.0      Sample source ARTERIAL      SITE LEFT RADIAL      WILLI'S TEST YES     CBC WITH AUTOMATED DIFF    Collection Time: 06/17/17  4:09 AM   Result Value Ref Range    WBC 4.5 3.6 - 11.0 K/uL    RBC 3.70 (L) 3.80 - 5.20 M/uL    HGB 9.6 (L) 11.5 - 16.0 g/dL    HCT 30.4 (L) 35.0 - 47.0 %    MCV 82.2 80.0 - 99.0 FL    MCH 25.9 (L) 26.0 - 34.0 PG    MCHC 31.6 30.0 - 36.5 g/dL    RDW 15.9 (H) 11.5 - 14.5 %    PLATELET 137 774 - 370 K/uL    NEUTROPHILS 44 32 - 75 %    LYMPHOCYTES 45 12 - 49 %    MONOCYTES 9 5 - 13 %    EOSINOPHILS 2 0 - 7 %    BASOPHILS 0 0 - 1 %    ABS. NEUTROPHILS 2.0 1.8 - 8.0 K/UL    ABS. LYMPHOCYTES 2.0 0.8 - 3.5 K/UL    ABS. MONOCYTES 0.4 0.0 - 1.0 K/UL    ABS. EOSINOPHILS 0.1 0.0 - 0.4 K/UL    ABS. BASOPHILS 0.0 0.0 - 0.1 K/UL   LACTIC ACID, PLASMA    Collection Time: 06/17/17  4:09 AM   Result Value Ref Range    Lactic acid 3.9 (HH) 0.4 - 2.0 MMOL/L   TROPONIN I    Collection Time: 06/17/17  4:09 AM   Result Value Ref Range    Troponin-I, Qt. 3.24 (H) <0.05 ng/mL   PRO-BNP    Collection Time: 06/17/17  4:09 AM   Result Value Ref Range    NT pro-BNP >60215 (H) 0 - 450 PG/ML   CK W/ CKMB & INDEX    Collection Time: 06/17/17  4:09 AM   Result Value Ref Range    CK 70 26 - 192 U/L    CK - MB 1.2 <3.6 NG/ML    CK-MB Index 1.7 0 - 2.5     METABOLIC PANEL, COMPREHENSIVE    Collection Time: 06/17/17  4:09 AM   Result Value Ref Range    Sodium 139 136 - 145 mmol/L    Potassium 4.5 3.5 - 5.1 mmol/L    Chloride 108 97 - 108 mmol/L    CO2 20 (L) 21 - 32 mmol/L    Anion gap 11 5 - 15 mmol/L    Glucose 139 (H) 65 - 100 mg/dL    BUN 39 (H) 6 - 20 MG/DL    Creatinine 2.30 (H) 0.55 - 1.02 MG/DL    BUN/Creatinine ratio 17 12 - 20      GFR est AA 23 (L) >60 ml/min/1.73m2    GFR est non-AA 19 (L) >60 ml/min/1.73m2    Calcium 8.3 (L) 8.5 - 10.1 MG/DL    Bilirubin, total 0.4 0.2 - 1.0 MG/DL    ALT (SGPT) 15 12 - 78 U/L    AST (SGOT) 23 15 - 37 U/L    Alk. phosphatase 94 45 - 117 U/L    Protein, total 9.1 (H) 6.4 - 8.2 g/dL    Albumin 2.5 (L) 3.5 - 5.0 g/dL    Globulin 6.6 (H) 2.0 - 4.0 g/dL    A-G Ratio 0.4 (L) 1.1 - 2.2         IMAGING RESULTS:  CXR Results  (Last 48 hours)               06/17/17 0512  XR CHEST PORT Final result    Impression:  IMPRESSION: There is increased haziness in the midlungs and bibasilar   atelectasis/effusions consistent with moderate edema.        Narrative:  EXAM:  XR CHEST PORT INDICATION:  Chest Pain       COMPARISON:  2016       FINDINGS: A portable AP radiograph of the chest was obtained at 503 hours. There   is bibasilar atelectasis/effusions. .  There is perivascular ill definition   consistent with edema. .  There is mild cardiomegaly. .  There are degenerative   changes in the shoulders and spine. Adrien Parada MEDICATIONS GIVEN:  Medications   furosemide (LASIX) injection 40 mg (not administered)   aspirin (ASPIRIN) tablet 325 mg (325 mg Oral Given 6/17/17 9757)       IMPRESSION:  1. Congestive heart failure, unspecified congestive heart failure chronicity, unspecified congestive heart failure type ([address]    2. NSTEMI (non-ST elevated myocardial infarction) ([facility]    3. Chronic atrial fibrillation ([facility]    4. CKD (chronic kidney disease), unspecified stage        PLAN:  1. Admit to hospitalist     ADMIT NOTE:  5:40 AM  The patient is being admitted to the hospital by Dr. Maddie Lam. The results of their tests and reasons for their admission have been discussed with the patient and/or available family. They convey agreement and understanding for the need to be admitted and for their admission diagnosis. This note is prepared by Gatito Nuñez, acting as Scribe for Evangelist Cordova MD.    Evangelist Cordova MD: The scribe's documentation has been prepared under my direction and personally reviewed by me in its entirety. I confirm that the note above accurately reflects all work, treatment, procedures, and medical decision making performed by me.

## 2017-06-17 NOTE — CONSULTS
Cardiology Consult Note       Date of  Admission: 6/17/2017  3:35 AM     Admission type:Emergency     Subjective:     Ms. Niru Gutierrez is admitted with acute pulmonary edama, elevated troponin suggestive of NSTEMI. No history obtained from patient is she is lethargic somnolent. History from chart, family. Developed sudden SOB. Has h/o afib. No previous MI. Had CVA. Echo 4/17 showed normal LVEF.     Patient Active Problem List    Diagnosis Date Noted    Pulmonary edema 06/17/2017    NSTEMI (non-ST elevated myocardial infarction) ([address] 06/17/2017    CVA, old, alterations of sensations 04/28/2017    Altered mental status 04/28/2017    Diuretic-induced hypokalemia 04/20/2015    Essential hypertension 04/15/2015    Atrial fibrillation ([address] 07/28/2014    Angina, class II ([address] 03/05/2013    Obesity 02/25/2013    Advanced age 02/29/2012    DNR (do not resuscitate) 11/29/2011    Coronary atherosclerosis of native coronary artery 05/07/2011    Carpal tunnel syndrome 03/28/2011    TIA (transient ischemic attack) 01/14/2011    Anxiety reaction 07/29/2010    Right lumbar radiculopathy 05/25/2010    DJD (degenerative joint disease), multiple sites 03/31/2010    Insomnia 03/31/2010    Gout 11/06/2009    High cholesterol 11/06/2009    Kidney disease, chronic, stage III (moderate, EGFR 30-59 ml/min) 11/06/2009      Yun Cruz MD  Past Medical History:   Diagnosis Date    Angina, class II ([address] 3/5/2013    ARF (acute renal failure) ([address] 12/19/2010    Atrial fibrillation ([address] 7/28/2014    Bleeding per rectum 11/6/2009    Coronary atherosclerosis of native coronary artery 5/7/2011    DJD (degenerative joint disease)     DNR (do not resuscitate) 11/29/2011    Gout 11/6/2009    High cholesterol 11/6/2009    Hypertension     IHD (ischemic heart disease) 11/6/2009    Kidney disease, chronic, stage III (moderate, EGFR 30-59 ml/min) 11/6/2009    Kidney disease, chronic, stage III (moderate, EGFR 30-59 ml/min) 11/6/2009    Obesity     TIA (transient ischemic attack) 1/14/2011      Past Surgical History:   Procedure Laterality Date    COLONOSCOPY  4/11/2005     Dr. Albarado Bhaskar HX CHOLECYSTECTOMY  11/94    HX KASSANDRA      [address]     No Known Allergies   Family History   Problem Relation Age of Onset    Stroke Other     Hypertension Other     Heart Disease Other       Current Facility-Administered Medications   Medication Dose Route Frequency    [START ON 6/18/2017] aspirin delayed-release tablet 81 mg  81 mg Oral DAILY    carvedilol (COREG) tablet 12.5 mg  12.5 mg Oral BID WITH MEALS    cholecalciferol (VITAMIN D3) tablet 1,000 Units  1,000 Units Oral DAILY    clopidogrel (PLAVIX) tablet 75 mg  75 mg Oral DAILY    potassium chloride SR (KLOR-CON 10) tablet 10 mEq  10 mEq Oral BID    pravastatin (PRAVACHOL) tablet 20 mg  20 mg Oral QHS    traMADol (ULTRAM) tablet 50 mg  50 mg Oral Q8H PRN    sodium chloride (NS) flush 5-10 mL  5-10 mL IntraVENous Q8H    sodium chloride (NS) flush 5-10 mL  5-10 mL IntraVENous PRN    acetaminophen (TYLENOL) tablet 650 mg  650 mg Oral Q4H PRN    ondansetron (ZOFRAN) injection 4 mg  4 mg IntraVENous Q4H PRN    furosemide (LASIX) injection 40 mg  40 mg IntraVENous BID    nitroglycerin (NITROBID) 2 % ointment 1 Inch  1 Inch Topical Q6H    enoxaparin (LOVENOX) 70 mg  70 mg SubCUTAneous Q24H     Current Outpatient Prescriptions   Medication Sig    colchicine 0.6 mg tablet Take 0.6 mg by mouth daily as needed (gout).  isosorbide mononitrate ER (IMDUR) 30 mg tablet Take 30 mg by mouth daily.  furosemide (LASIX) 20 mg tablet Take 20 mg by mouth two (2) times a day.  loperamide (IMODIUM) 2 mg capsule Take 2 mg by mouth every six (6) hours as needed for Diarrhea.  POTASSIUM CHLORIDE SR 10 MEQ TAB Take 1 Cap by mouth two (2) times a day.     pravastatin (PRAVACHOL) 20 mg tablet Take 20 mg by mouth nightly. Indications: hypercholesterolemia    traMADol (ULTRAM) 50 mg tablet Take 50 mg by mouth every eight (8) hours as needed for Pain.  cholecalciferol (VITAMIN D3) 1,000 unit tablet Take 1,000 Units by mouth daily.  carvedilol (COREG) 12.5 mg tablet TAKE 1 AND 1/2 TABLETS BY MOUTH TWICE DAILY WITH MEALS    clopidogrel (PLAVIX) 75 mg tablet TAKE 1 TABLET BY MOUTH DAILY.  aspirin delayed-release 81 mg tablet Take 81 mg by mouth daily.  magnesium chloride (MAG DELAY) 64 mg tablet Take 64 mg by mouth daily. Review of Symptoms:  Review of systems not obtained due to patient factors. Physical Exam    Visit Vitals    /65    Pulse 60    Temp 99 °F (37.2 °C)    Resp 22    Wt 150 lb 9.6 oz (68.3 kg)    SpO2 97%    BMI 26.68 kg/m2     General Appearance:  Well developed, well nourished,alert and oriented x 0, and individual in no acute distress. Ears/Nose/Mouth/Throat:    grossly normal.         Neck: Supple. Chest:   Lungs clear to auscultation bilaterally. Cardiovascular:  Regular rate and rhythm, S1, S2 normal, no murmur. Abdomen:   Soft, non-tender, bowel sounds are active. Extremities: No edema bilaterally. Skin: Warm and dry. Cardiographics    Telemetry: normal sinus rhythm  ECG:  normal sinus rhythm, T wave inversions in anterior leads which are new.   Echocardiogram: Not done    Labs:   Recent Results (from the past 24 hour(s))   BLOOD GAS, ARTERIAL    Collection Time: 06/17/17  3:53 AM   Result Value Ref Range    pH 7.39 7.35 - 7.45      PCO2 32 (L) 35.0 - 45.0 mmHg    PO2 399 (H) 80 - 100 mmHg    O2  (H) 92 - 97 %    BICARBONATE 19 (L) 22 - 26 mmol/L    BASE DEFICIT 5.1 mmol/L    O2 METHOD BIPAP      FIO2 100 %    MODE BIPAP      SET RATE 4      IPAP/PIP 12.0      EPAP/CPAP/PEEP 5.0      Sample source ARTERIAL      SITE LEFT RADIAL      WILLI'S TEST YES     CBC WITH AUTOMATED DIFF    Collection Time: 06/17/17  4:09 AM   Result Value Ref Range    WBC 4.5 3.6 - 11.0 K/uL    RBC 3.70 (L) 3.80 - 5.20 M/uL    HGB 9.6 (L) 11.5 - 16.0 g/dL    HCT 30.4 (L) 35.0 - 47.0 %    MCV 82.2 80.0 - 99.0 FL    MCH 25.9 (L) 26.0 - 34.0 PG    MCHC 31.6 30.0 - 36.5 g/dL    RDW 15.9 (H) 11.5 - 14.5 %    PLATELET 953 221 - 928 K/uL    NEUTROPHILS 44 32 - 75 %    LYMPHOCYTES 45 12 - 49 %    MONOCYTES 9 5 - 13 %    EOSINOPHILS 2 0 - 7 %    BASOPHILS 0 0 - 1 %    ABS. NEUTROPHILS 2.0 1.8 - 8.0 K/UL    ABS. LYMPHOCYTES 2.0 0.8 - 3.5 K/UL    ABS. MONOCYTES 0.4 0.0 - 1.0 K/UL    ABS. EOSINOPHILS 0.1 0.0 - 0.4 K/UL    ABS. BASOPHILS 0.0 0.0 - 0.1 K/UL   LACTIC ACID, PLASMA    Collection Time: 06/17/17  4:09 AM   Result Value Ref Range    Lactic acid 3.9 (HH) 0.4 - 2.0 MMOL/L   TROPONIN I    Collection Time: 06/17/17  4:09 AM   Result Value Ref Range    Troponin-I, Qt. 3.24 (H) <0.05 ng/mL   PRO-BNP    Collection Time: 06/17/17  4:09 AM   Result Value Ref Range    NT pro-BNP >54870 (H) 0 - 450 PG/ML   CK W/ CKMB & INDEX    Collection Time: 06/17/17  4:09 AM   Result Value Ref Range    CK 70 26 - 192 U/L    CK - MB 1.2 <3.6 NG/ML    CK-MB Index 1.7 0 - 2.5     METABOLIC PANEL, COMPREHENSIVE    Collection Time: 06/17/17  4:09 AM   Result Value Ref Range    Sodium 139 136 - 145 mmol/L    Potassium 4.5 3.5 - 5.1 mmol/L    Chloride 108 97 - 108 mmol/L    CO2 20 (L) 21 - 32 mmol/L    Anion gap 11 5 - 15 mmol/L    Glucose 139 (H) 65 - 100 mg/dL    BUN 39 (H) 6 - 20 MG/DL    Creatinine 2.30 (H) 0.55 - 1.02 MG/DL    BUN/Creatinine ratio 17 12 - 20      GFR est AA 23 (L) >60 ml/min/1.73m2    GFR est non-AA 19 (L) >60 ml/min/1.73m2    Calcium 8.3 (L) 8.5 - 10.1 MG/DL    Bilirubin, total 0.4 0.2 - 1.0 MG/DL    ALT (SGPT) 15 12 - 78 U/L    AST (SGOT) 23 15 - 37 U/L    Alk.  phosphatase 94 45 - 117 U/L    Protein, total 9.1 (H) 6.4 - 8.2 g/dL    Albumin 2.5 (L) 3.5 - 5.0 g/dL    Globulin 6.6 (H) 2.0 - 4.0 g/dL    A-G Ratio 0.4 (L) 1.1 - 2.2     EKG, 12 LEAD, INITIAL    Collection Time: 06/17/17  5:00 AM   Result Value Ref Range    Ventricular Rate 68 BPM    Atrial Rate 77 BPM    QRS Duration 64 ms    Q-T Interval 466 ms    QTC Calculation (Bezet) 495 ms    Calculated R Axis -2 degrees    Calculated T Axis -166 degrees    Diagnosis       Atrial fibrillation  Inferior infarct (cited on or before 21-AUG-2016)  Anterior infarct , age undetermined  ST & T wave abnormality, consider lateral ischemia or digitalis effect  Abnormal ECG  When compared with ECG of 10-CAMILLE-2017 13:37,  T wave inversion now evident in Anterior leads  QT has lengthened     LACTIC ACID, PLASMA    Collection Time: 06/17/17  8:17 AM   Result Value Ref Range    Lactic acid 2.8 (HH) 0.4 - 2.0 MMOL/L   LACTIC ACID, PLASMA    Collection Time: 06/17/17 12:15 PM   Result Value Ref Range    Lactic acid 1.6 0.4 - 2.0 MMOL/L        Assessment:     Assessment:       Principal Problem:    NSTEMI (non-ST elevated myocardial infarction) ([address] (6/17/2017)    Active Problems:    Pulmonary edema (6/17/2017)         Plan:     Principal Problem:    NSTEMI (non-ST elevated myocardial infarction) ([address] (6/17/2017)- conservative medical management due to her advanced age. Also, family does not wish to pursue any invasive management. [address] daughter [facility]) does not wish CPR, shock. Agree with ASA, betablocker. Continue plavix. Follow serial markers. Repeat echo. Continue diuresis, NTG. Active Problems:    Pulmonary edema (6/17/2017)    Thank  you for the consult. Will follow.         Alex Hidalgo MD

## 2017-06-17 NOTE — IP AVS SNAPSHOT
Current Discharge Medication List  
  
START taking these medications Dose & Instructions Dispensing Information Comments Morning Noon Evening Bedtime  
 acetaminophen 325 mg tablet Commonly known as:  TYLENOL Your last dose was: Your next dose is:    
   
   
 Dose:  650 mg Take 2 Tabs by mouth every four (4) hours as needed. Quantity:  40 Tab Refills:  0  
     
   
   
   
  
 potassium chloride SR 10 mEq tablet Commonly known as:  KLOR-CON 10 Replaces:  POTASSIUM CHLORIDE SR 10 MEQ TAB Your last dose was: Your next dose is:    
   
   
 Dose:  10 mEq Take 1 Tab by mouth daily. Quantity:  30 Tab Refills:  1 CONTINUE these medications which have CHANGED Dose & Instructions Dispensing Information Comments Morning Noon Evening Bedtime  
 furosemide 40 mg tablet Commonly known as:  LASIX What changed:   
- medication strength 
- how much to take - when to take this Your last dose was: Your next dose is:    
   
   
 Dose:  40 mg Take 1 Tab by mouth daily. Quantity:  30 Tab Refills:  1 CONTINUE these medications which have NOT CHANGED Dose & Instructions Dispensing Information Comments Morning Noon Evening Bedtime  
 aspirin delayed-release 81 mg tablet Your last dose was: Your next dose is:    
   
   
 Dose:  81 mg Take 81 mg by mouth daily. Refills:  0  
     
   
   
   
  
 carvedilol 12.5 mg tablet Commonly known as:  Betty Salinas Your last dose was: Your next dose is: TAKE 1 AND 1/2 TABLETS BY MOUTH TWICE DAILY WITH MEALS Quantity:  90 Tab Refills:  5  
     
   
   
   
  
 clopidogrel 75 mg Tab Commonly known as:  PLAVIX Your last dose was: Your next dose is: TAKE 1 TABLET BY MOUTH DAILY. Quantity:  30 Tab Refills:  11  
     
   
   
   
  
 colchicine 0.6 mg tablet Your last dose was: Your next dose is:    
   
   
 Dose:  0.6 mg Take 0.6 mg by mouth daily as needed (gout). Refills:  0  
     
   
   
   
  
 isosorbide mononitrate ER 30 mg tablet Commonly known as:  IMDUR Your last dose was: Your next dose is:    
   
   
 Dose:  30 mg Take 30 mg by mouth daily. Refills:  0  
     
   
   
   
  
 loperamide 2 mg capsule Commonly known as:  IMODIUM Your last dose was: Your next dose is:    
   
   
 Dose:  2 mg Take 2 mg by mouth every six (6) hours as needed for Diarrhea. Refills:  0  
     
   
   
   
  
 magnesium chloride 64 mg tablet Commonly known as:  MAG DELAY Your last dose was: Your next dose is:    
   
   
 Dose:  64 mg Take 64 mg by mouth daily. Refills:  0  
     
   
   
   
  
 pravastatin 20 mg tablet Commonly known as:  PRAVACHOL Your last dose was: Your next dose is:    
   
   
 Dose:  20 mg Take 20 mg by mouth nightly. Indications: hypercholesterolemia Refills:  0  
     
   
   
   
  
 traMADol 50 mg tablet Commonly known as:  ULTRAM  
   
Your last dose was: Your next dose is:    
   
   
 Dose:  50 mg Take 1 Tab by mouth every eight (8) hours as needed for Pain. Max Daily Amount: 150 mg.  
 Quantity:  40 Tab Refills:  0  
     
   
   
   
  
 VITAMIN D3 1,000 unit tablet Generic drug:  cholecalciferol Your last dose was: Your next dose is:    
   
   
 Dose:  1000 Units Take 1,000 Units by mouth daily. Refills:  0 STOP taking these medications POTASSIUM CHLORIDE SR 10 MEQ TAB Replaced by:  potassium chloride SR 10 mEq tablet Where to Get Your Medications Information on where to get these meds will be given to you by the nurse or doctor. ! Ask your nurse or doctor about these medications  
  acetaminophen 325 mg tablet furosemide 40 mg tablet  
 potassium chloride SR 10 mEq tablet  
 traMADol 50 mg tablet

## 2017-06-17 NOTE — ED NOTES
Assumed care of pt at this time, received bedside report from [address] [place] [address] with pt included in care. Pt is resting in room, with call bell in reach. All questions answered.

## 2017-06-17 NOTE — ED NOTES
Called dietary and they will sent [facility] as requested by patient

## 2017-06-17 NOTE — ED NOTES
Again attempted to give patient PO clear liquid diet and PO meds. Patient states \"not right now\" and is requesting to rest. Lights dimmed and patient positioned for comfort.

## 2017-06-18 NOTE — PROGRESS NOTES
Hospitalist Progress Note    NAME: Jose Riavs   :  8/15/1911   MRN:  [id number]       Assessment / Plan:  NSTEMI / CAD   -Not a procedural candidate due to age and pts wishes. -ECHO 17 with EF 71%, grade 3 diastolic dysfunction. -Medical management per cardiology.  -therapeutic Lovenox  -Change Imdur to NTP   -Cont home Plavix/ASA , statin  -Cont BB: coreg   -Cycle troponin. Peak 3.24 on .  -cardiology following.  -check echo.     Acute hypoxic respiratory failure due to acute pulmonary edema due to acute on chronic HF diastolic dysfunction EF 64%  -Adm cxr with There is increased haziness in the midlungs and bibasilar  -pro BNP >12931  atelectasis/effusions consistent with moderate edema.  -diuresis with IV lasix 40mg bid. At home on 20mg bid.  -Close monitoring of fluid status with strict I&O, daily weight  -Continue supplemental o2 rpn, wean as tolerating. prn bipap  -Closely monitor electrolytes with aggressive diureses, cont home daily K supplement      Lactic acidosis   -Likely due to above, due to poor perfusion state   -resolved     Chronic Afib  -HR initially in RVR, improved once on BIPAP  -cont home BB   -Not a candidate for [facility] due to age      CKD stage III  -Cr at baseline, monitor closely with aggressive diureses. -Avoid nephrotoxic drugs, adjust all meds to GFR.      Anemia of chronic disease   -Hb stable  -Monitor closely      Moderate protein calory malnutrition, albumin 2.5   Hyperlipidemia, cont home statin   H/o stroke, cont ASA/Plavix       Body mass index is 27.4 kg/(m^2). Code status: DDNR  Prophylaxis: Lovenox  Recommended Disposition: SNF[facility]TC [facility]va in 2017     Subjective:     Chief Complaint / Reason for Physician Visit  Patient resting on 2L NC. Says she feels much improved since yesterday. Has some SOB and wheezing. Denies cough or sputum. Denies CP. Discussed with RN events overnight.      Review of Systems:  Symptom Y/N Comments Symptom Y/N Comments   Fever/Chills n   Chest Pain n    Poor Appetite    Edema y    Cough n   Abdominal Pain n    Sputum    Joint Pain     SOB/MCNAMARA y   Pruritis/Rash     Nausea/vomit n   Tolerating PT/OT     Diarrhea    Tolerating Diet     Constipation    Other       Could NOT obtain due to:      Objective:     VITALS:   Last 24hrs VS reviewed since prior progress note. Most recent are:  Patient Vitals for the past 24 hrs:   Temp Pulse Resp BP SpO2   06/18/17 1244 - 71 - 129/65 -   06/18/17 1038 - 83 - 130/82 100 %   06/18/17 0748 98.2 °F (36.8 °C) 66 18 118/66 100 %   06/18/17 0336 97.8 °F (36.6 °C) 76 16 113/65 100 %   06/17/17 2323 98.5 °F (36.9 °C) 79 16 113/57 100 %   06/17/17 1949 98.6 °F (37 °C) 67 16 136/76 100 %   06/17/17 1845 98.3 °F (36.8 °C) 63 18 123/68 100 %   06/17/17 1700 - 72 26 134/79 100 %   06/17/17 1400 - 60 22 126/65 97 %       Intake/Output Summary (Last 24 hours) at 06/18/17 1353  Last data filed at 06/18/17 0554   Gross per 24 hour   Intake                0 ml   Output             1250 ml   Net            -1250 ml        PHYSICAL EXAM:  General: WD, WN. Alert, cooperative, no acute distress    EENT:  EOMI. Anicteric sclerae. MMM  Resp:  wheezing. No accessory muscle use  CV:  irregular  rhythm,  + edema  GI:  Soft, Non distended, Non tender.  +Bowel sounds  Neurologic:  Alert and oriented X 3, normal speech,   Psych:   Fair insight. Not anxious nor agitated  Skin:  No rashes.   No jaundice    Reviewed most current lab test results and cultures  YES  Reviewed most current radiology test results   YES  Review and summation of old records today    NO  Reviewed patient's current orders and MAR    YES  PMH/SH reviewed - no change compared to H&P  ________________________________________________________________________  Care Plan discussed with:    Comments   Patient y    Family      RN y    Care Manager     Consultant                        Multidiciplinary team rounds were held today with case manager, nursing, pharmacist and clinical coordinator. Patient's plan of care was discussed; medications were reviewed and discharge planning was addressed. ________________________________________________________________________  Total NON critical care TIME:  35   Minutes    Total CRITICAL CARE TIME Spent:   Minutes non procedure based      Comments   >50% of visit spent in counseling and coordination of care     ________________________________________________________________________  Ruddy Álvarez MD     Procedures: see electronic medical records for all procedures/Xrays and details which were not copied into this note but were reviewed prior to creation of Plan. LABS:  I reviewed today's most current labs and imaging studies.   Pertinent labs include:  Recent Labs      06/18/17   0342  06/17/17   0409   WBC  3.8  4.5   HGB  8.4*  9.6*   HCT  25.2*  30.4*   PLT  232  302     Recent Labs      06/18/17   0342  06/17/17   0409   NA  142  139   K  3.7  4.5   CL  109*  108   CO2  21  20*   GLU  71  139*   BUN  41*  39*   CREA  2.18*  2.30*   CA  8.3*  8.3*   MG  2.2   --    ALB   --   2.5*   TBILI   --   0.4   SGOT   --   23   ALT   --   15       Signed: Ruddy Álvarez MD

## 2017-06-18 NOTE — PROGRESS NOTES
PCU SHIFT NURSING NOTE      Bedside shift change report given to Perri Zayas (oncoming nurse) by Yasmine Maddox (offgoing nurse). Report included the following information SBAR, Kardex, Intake/Output, MAR, Recent Results and Cardiac Rhythm afib. Shift Summary:   4268 Bedside Report received from Perridennis Zayas, [place]. SBAR, Kardex, ED Summary, Intake/Output, MAR, Recent Results or Cardiac Rhythm afib were discussed. Guzman Joaquin RN assumed care of the pt.  1000 pt assisted with clear liq tray, ate 100% being fed.  1300 Dr Pretty Schooling in to see pt, will adv diet. Spoke with granddaughter who states that pt has been getting up and walking with PT at [facility]. 1830 diet has been advanced to mechanical soft diet, marvel small amounts well. Even though granddaughter has brought in her upper dentures, pt still won't eat anything that requires chewing. Admission Date 6/17/2017   Admission Diagnosis Pulmonary edema  NSTEMI (non-ST elevated myocardial infarction) ([facility] [address]   Consults IP CONSULT TO CARDIOLOGY  IP CONSULT TO PULMONOLOGY        Consults   []PT   []OT   []Speech   []Case Management      [] Palliative      Cardiac Monitoring Order   [x]Yes   []No     IV drips   []Yes    Drip:                            Dose:  Drip:                            Dose:  Drip:                            Dose:   [x]No     GI Prophylaxis   []Yes   []No         DVT Prophylaxis   SCDs:             Jed stockings:         [] Medication   []Contraindicated   []None      Activity Level Activity Level: Bed Rest     Activity Assistance: Complete care   Purposeful Rounding every 1-2 hour? [x]Yes   Thomas Score  Total Score: 3   Bed Alarm (If score 3 or >)   [x]Yes   [] Refused (See signed refusal form in chart)   Vinny Score  Vinny Score: 14   Vinny Score (if score 14 or less)   []PMT consult   []Wound Care consult      []Specialty bed   [] Nutrition consult          Needs prior to discharge:   Home O2 required:    []Yes   [x]No    If yes, how much O2 required? Other:    Last Bowel Movement: Last Bowel Movement Date: 06/18/17      Influenza Vaccine Received Flu Vaccine for Current Season (usually Sept-March): Not Flu Season        Pneumonia Vaccine           Diet Active Orders   Diet    DIET MECHANICAL SOFT      LDAs               Peripheral IV 06/17/17 Right Antecubital (Active)   Site Assessment Clean, dry, & intact 6/18/2017  8:00 AM   Phlebitis Assessment 0 6/18/2017  8:00 AM   Infiltration Assessment 0 6/18/2017  8:00 AM   Dressing Status Clean, dry, & intact 6/18/2017  8:00 AM   Dressing Type Transparent;Tape 6/18/2017  8:00 AM   Hub Color/Line Status Pink;Flushed;Capped 6/18/2017  8:00 AM   Alcohol Cap Used No 6/17/2017  4:14 AM                      Urinary Catheter      Intake & Output   Date 06/17/17 1900 - 06/18/17 0659 06/18/17 0700 - 06/19/17 0659   Shift 8237-4940 24 Hour Total 7725-8083 9386-7733 24 Hour Total   I  N  T  A  K  E   P.O.  120 1040  1040      P. O.  120 1040  1040    Shift Total  (mL/kg)  120  (1.7) 1040  (14.8)  1040  (14.8)   O  U  T  P  U  T   Urine  (mL/kg/hr) 1250 1250 800  (1)  800      Urine Voided 1250 1250 800  800      Urine Occurrence(s)   2 x  2 x    Stool           Stool Occurrence(s)   2 x  2 x    Shift Total  (mL/kg) 1250  (17.8) 1250  (17.8) 800  (11.4)  800  (11.4)   NET -1250 -1130 240  240   Weight (kg) 70.2 70.2 70.2 70.2 70.2         Readmission Risk Assessment Tool Score Medium Risk            14       Total Score        3 Relationship with PCP    2 Patient Living Status    4 More than 1 Admission in calendar year    5 Patient Insurance is Medicare, Medicaid or Self Pay        Criteria that do not apply:    Patient Length of Stay > 5    Charlson Comorbidity Score       Expected Length of Stay - - -   Actual Length of Stay 1              Martha Mendez RN

## 2017-06-18 NOTE — PROGRESS NOTES
Cardiology Progress Note      6/18/2017 7:38 AM    Admit Date: 6/17/2017    Admit Diagnosis: Pulmonary edema;NSTEMI (non-ST elevated myocardial infarcti*      Subjective:     Raymon Tatum looks better today. More awake. Troponin trending down. Denies any chest pain. Visit Vitals    /65 (BP 1 Location: Left arm, BP Patient Position: At rest)    Pulse 76    Temp 97.8 °F (36.6 °C)    Resp 16    Ht 5' 3\" (1.6 m)    Wt 154 lb 11.2 oz (70.2 kg)    SpO2 100%    BMI 27.4 kg/m2       Current Facility-Administered Medications   Medication Dose Route Frequency    aspirin delayed-release tablet 81 mg  81 mg Oral DAILY    carvedilol (COREG) tablet 12.5 mg  12.5 mg Oral BID WITH MEALS    cholecalciferol (VITAMIN D3) tablet 1,000 Units  1,000 Units Oral DAILY    clopidogrel (PLAVIX) tablet 75 mg  75 mg Oral DAILY    potassium chloride SR (KLOR-CON 10) tablet 10 mEq  10 mEq Oral BID    pravastatin (PRAVACHOL) tablet 20 mg  20 mg Oral QHS    traMADol (ULTRAM) tablet 50 mg  50 mg Oral Q8H PRN    sodium chloride (NS) flush 5-10 mL  5-10 mL IntraVENous Q8H    sodium chloride (NS) flush 5-10 mL  5-10 mL IntraVENous PRN    acetaminophen (TYLENOL) tablet 650 mg  650 mg Oral Q4H PRN    ondansetron (ZOFRAN) injection 4 mg  4 mg IntraVENous Q4H PRN    furosemide (LASIX) injection 40 mg  40 mg IntraVENous BID    nitroglycerin (NITROBID) 2 % ointment 1 Inch  1 Inch Topical Q6H    enoxaparin (LOVENOX) 70 mg  70 mg SubCUTAneous Q24H         Objective:      Physical Exam:  Visit Vitals    /65    Pulse 71    Temp 98.2 °F (36.8 °C)    Resp 18    Ht 5' 3\" (1.6 m)    Wt 154 lb 11.2 oz (70.2 kg)    SpO2 100%    Breastfeeding No    BMI 27.4 kg/m2     General Appearance:  Well developed, well nourished,alert and oriented x 2, and individual in no acute distress. Ears/Nose/Mouth/Throat:   Hearing grossly normal.         Neck: Supple. Chest:   Lungs clear to auscultation bilaterally. Cardiovascular:  Regular rate and rhythm, S1, S2 normal, no murmur. Abdomen:   Soft, non-tender, bowel sounds are active. Extremities: No edema bilaterally. Skin: Warm and dry. Data Review:   Labs:    Recent Results (from the past 24 hour(s))   LACTIC ACID, PLASMA    Collection Time: 06/17/17  8:17 AM   Result Value Ref Range    Lactic acid 2.8 (HH) 0.4 - 2.0 MMOL/L   LACTIC ACID, PLASMA    Collection Time: 06/17/17 12:15 PM   Result Value Ref Range    Lactic acid 1.6 0.4 - 2.0 MMOL/L   METABOLIC PANEL, BASIC    Collection Time: 06/18/17  3:42 AM   Result Value Ref Range    Sodium 142 136 - 145 mmol/L    Potassium 3.7 3.5 - 5.1 mmol/L    Chloride 109 (H) 97 - 108 mmol/L    CO2 21 21 - 32 mmol/L    Anion gap 12 5 - 15 mmol/L    Glucose 71 65 - 100 mg/dL    BUN 41 (H) 6 - 20 MG/DL    Creatinine 2.18 (H) 0.55 - 1.02 MG/DL    BUN/Creatinine ratio 19 12 - 20      GFR est AA 25 (L) >60 ml/min/1.73m2    GFR est non-AA 21 (L) >60 ml/min/1.73m2    Calcium 8.3 (L) 8.5 - 10.1 MG/DL   MAGNESIUM    Collection Time: 06/18/17  3:42 AM   Result Value Ref Range    Magnesium 2.2 1.6 - 2.4 mg/dL   CBC W/O DIFF    Collection Time: 06/18/17  3:42 AM   Result Value Ref Range    WBC 3.8 3.6 - 11.0 K/uL    RBC 3.11 (L) 3.80 - 5.20 M/uL    HGB 8.4 (L) 11.5 - 16.0 g/dL    HCT 25.2 (L) 35.0 - 47.0 %    MCV 81.0 80.0 - 99.0 FL    MCH 27.0 26.0 - 34.0 PG    MCHC 33.3 30.0 - 36.5 g/dL    RDW 15.7 (H) 11.5 - 14.5 %    PLATELET 339 113 - 433 K/uL   TROPONIN I    Collection Time: 06/18/17  3:42 AM   Result Value Ref Range    Troponin-I, Qt. 2.63 (H) <0.05 ng/mL       Telemetry: AFIB      Assessment:     Principal Problem:    NSTEMI (non-ST elevated myocardial infarction) ([facility] [address] (6/17/2017)    Active Problems:    Pulmonary edema (6/17/2017)        Plan:     Continue current therapy. No invasive management planned. Echo pending.     Libby Paredes MD

## 2017-06-19 PROBLEM — I25.5 ISCHEMIC CARDIOMYOPATHY: Status: ACTIVE | Noted: 2017-01-01

## 2017-06-19 NOTE — PROGRESS NOTES
PCU SHIFT NURSING NOTE      Bedside shift change report given to Aparna Bowman (oncoming nurse) by Maryse Gannon (offgoing nurse). Report included the following information SBAR, Kardex, Procedure Summary, Intake/Output, MAR, Recent Results and Cardiac Rhythm afib. Shift Summary:   4491 Bedside Report received from Aparna Bowman [facility] [address]. SBAR, Kardex, Intake/Output, MAR, Recent Results or Cardiac Rhythm afib were discussed. Hollie Kehr RN assumed care of the pt.  0800 pt was able to get into a sitting position independently. 0900 pt up to chair for meal, marvel well with 2 person transfer- though pt does not allow anyone to hold left arm r/t left shoulder pain. 0920 pt up to [facility] with soft BM, marvel well. 1000 pt incont of liquid stool, assisted back up to [facility], am care complete. 1230 pt returned from having Echo, marvel well, assisted back to bed  1630 granddaughter in to see pt, concerned that pt is getting more confused. Admission Date 6/17/2017   Admission Diagnosis Pulmonary edema  NSTEMI (non-ST elevated myocardial infarction) ([facility] [address]   Consults IP CONSULT TO CARDIOLOGY  IP CONSULT TO PULMONOLOGY        Consults   [x]PT   [x]OT   []Speech   [x]Case Management      [x] Palliative      Cardiac Monitoring Order   [x]Yes   []No     IV drips   []Yes    Drip:                            Dose:  Drip:                            Dose:  Drip:                            Dose:   [x]No     GI Prophylaxis   [x]Yes   []No         DVT Prophylaxis   SCDs:             Jed stockings:         [x] Medication   []Contraindicated   []None      Activity Level Activity Level: Up with Assistance     Activity Assistance: Partial (one person)   Purposeful Rounding every 1-2 hour?    [x]Yes   Thomas Score  Total Score: 4   Bed Alarm (If score 3 or >)   [x]Yes   [] Refused (See signed refusal form in chart)   Vinny Score  Vinny Score: 14   Vinny Score (if score 14 or less)   []PMT consult   []Wound Care consult      []Specialty bed   [] Nutrition consult Needs prior to discharge:   Home O2 required:    []Yes   [x]No    If yes, how much O2 required? Other:    Last Bowel Movement: Last Bowel Movement Date: 06/19/17      Influenza Vaccine Received Flu Vaccine for Current Season (usually Sept-March): Not Flu Season        Pneumonia Vaccine           Diet Active Orders   Diet    DIET MECHANICAL SOFT      LDAs               Peripheral IV 06/17/17 Right Antecubital (Active)   Site Assessment Clean, dry, & intact 6/19/2017  3:30 PM   Phlebitis Assessment 0 6/19/2017  3:30 PM   Infiltration Assessment 0 6/19/2017  3:30 PM   Dressing Status Clean, dry, & intact 6/19/2017  3:30 PM   Dressing Type Transparent;Tape 6/19/2017  3:30 PM   Hub Color/Line Status Pink;Flushed;Capped 6/19/2017  3:30 PM   Alcohol Cap Used No 6/19/2017  3:58 AM                      Urinary Catheter      Intake & Output   Date 06/18/17 1900 - 06/19/17 0659 06/19/17 0700 - 06/20/17 0659   Shift 8793-4998 24 Hour Total 8726-0267 9111-3180 24 Hour Total   I  N  T  A  K  E   P.O.  1040 360 240 600      P. O.  1040 360 240 600    Shift Total  (mL/kg)  1040  (16.2) 360  (5.6) 240  (3.7) 600  (9.3)   O  U  T  P  U  T   Urine  (mL/kg/hr) 1125 1925 500  (0.6)  500      Urine Voided 1125 1925 500  500      Urine Occurrence(s)  2 x 2 x  2 x    Stool           Stool Occurrence(s)  2 x 2 x  2 x    Shift Total  (mL/kg) 1125  (17.5) 1925  (29.9) 500  (7.8)  500  (7.8)   NET -1125 -885 -140 240 100   Weight (kg) 64.3 64.3 64.3 64.3 64.3         Readmission Risk Assessment Tool Score Medium Risk            14       Total Score        3 Relationship with PCP    2 Patient Living Status    4 More than 1 Admission in calendar year    5 Patient Insurance is Medicare, Medicaid or Self Pay        Criteria that do not apply:    Patient Length of Stay > 5    Charlson Comorbidity Score       Expected Length of Stay 4d 12h   Actual Length of Stay 2              Josh Brenner RN

## 2017-06-19 NOTE — PROGRESS NOTES
Hospitalist Progress Note    NAME: Valdo Oneill   :  8/15/1911   MRN:  [id number]       Assessment / Plan:  Acute hypoxic respiratory failure with acute pulmonary edema in setting of Acute systolic heart failure EF 20-25% / NSTEMI / Chronic afib  -ECHO 17 with EF 98%, grade 3 diastolic dysfunction. -ECHO 17 with EF 20-25%. hypokinesis of the mid anteroseptal and apical septal walls. DOMINGO. Mod-sever MR; mod AS; mod to severe TR  -pro BNP >31026  -Adm cxr with There is increased haziness in the midlungs and bibasilar  atelectasis/effusions consistent with moderate edema.  -Troponin Peak 3.24 on .  -Not a procedural candidate due to age and pts wishes. Poor candidate for ICD placement. -Medical management per cardiology. Continue ASA, plavix, BB, statin, ntg. No Acei with poor renal function. Was on therapeutic Lovenox x 48 hours. Now on dvt proph.  -continue diuresis with IV lasix 40mg bid. At home on 20mg bid.  -Close monitoring of fluid status with strict I&O, daily weight  -Continue supplemental o2 rpn, wean as tolerating. prn bipap  -monitor lytes replete prn, not anticoagulant candidate per cards.  -cardiology following, recs appreciated. Lactic acidosis   -Likely due to above, due to poor perfusion state   -resolved      CKD stage III  -Cr stable at baseline, monitor closely with aggressive diureses. -Avoid nephrotoxic drugs, adjust all meds to GFR.      Anemia of chronic disease   -Hb stable  -Monitor closely      Moderate protein calory malnutrition, albumin 2.5   Hyperlipidemia, cont home statin   H/o stroke, cont ASA/Plavix       Body mass index is 25.1 kg/(m^2). Code status: DDNR  Prophylaxis: Lovenox  Recommended Disposition: SNF[facility]TC [facility] in 2017     Subjective:     Chief Complaint / Reason for Physician Visit  Patient confused as to why she is in the hospital. Still having SOB/MCNAMARA. Denies CP. Discussed with RN events overnight.      Review of Systems:  Symptom Y/N Comments  Symptom Y/N Comments   Fever/Chills n   Chest Pain n    Poor Appetite    Edema y    Cough n   Abdominal Pain n    Sputum    Joint Pain     SOB/MCNAMARA y   Pruritis/Rash     Nausea/vomit n   Tolerating PT/OT     Diarrhea    Tolerating Diet     Constipation    Other       Could NOT obtain due to:      Objective:     VITALS:   Last 24hrs VS reviewed since prior progress note. Most recent are:  Patient Vitals for the past 24 hrs:   Temp Pulse Resp BP SpO2   06/19/17 0358 98.5 °F (36.9 °C) 82 18 112/59 100 %   06/18/17 2339 98.6 °F (37 °C) 75 18 134/71 100 %   06/18/17 1950 98.2 °F (36.8 °C) 67 18 135/85 98 %       Intake/Output Summary (Last 24 hours) at 06/19/17 1559  Last data filed at 06/19/17 1476   Gross per 24 hour   Intake                0 ml   Output             1125 ml   Net            -1125 ml        PHYSICAL EXAM:  General: WD, WN. Alert, cooperative, no acute distress    EENT:  EOMI. Anicteric sclerae. MMM  Resp:  wheezing. No accessory muscle use  CV:  irregular  rhythm,  + edema  GI:  Soft, Non distended, Non tender.  +Bowel sounds  Neurologic:  Alert and oriented X 2 to name and in hospital, normal speech,   Psych:   Fair insight. Not anxious nor agitated  Skin:  No rashes. No jaundice    Reviewed most current lab test results and cultures  YES  Reviewed most current radiology test results   YES  Review and summation of old records today    NO  Reviewed patient's current orders and MAR    YES  PMH/ reviewed - no change compared to H&P  ________________________________________________________________________  Care Plan discussed with:    Comments   Patient y    Family      RN y    Care Manager     Consultant                        Multidiciplinary team rounds were held today with , nursing, pharmacist and clinical coordinator. Patient's plan of care was discussed; medications were reviewed and discharge planning was addressed. ________________________________________________________________________  Total NON critical care TIME:  30   Minutes    Total CRITICAL CARE TIME Spent:   Minutes non procedure based      Comments   >50% of visit spent in counseling and coordination of care     ________________________________________________________________________  Jake Cruz MD     Procedures: see electronic medical records for all procedures/Xrays and details which were not copied into this note but were reviewed prior to creation of Plan. LABS:  I reviewed today's most current labs and imaging studies.   Pertinent labs include:  Recent Labs      06/19/17 0229 06/18/17 0342 06/17/17   0409   WBC  3.5*  3.8  4.5   HGB  9.0*  8.4*  9.6*   HCT  27.8*  25.2*  30.4*   PLT  253  232  302     Recent Labs      06/19/17 0229 06/18/17 0342 06/17/17   0409   NA  142  142  139   K  3.8  3.7  4.5   CL  108  109*  108   CO2  23  21  20*   GLU  88  71  139*   BUN  39*  41*  39*   CREA  2.30*  2.18*  2.30*   CA  8.3*  8.3*  8.3*   MG  2.1  2.2   --    ALB   --    --   2.5*   TBILI   --    --   0.4   SGOT   --    --   23   ALT   --    --   15       Signed: Jake Cruz MD

## 2017-06-19 NOTE — PROGRESS NOTES
Nutrition: Patient discussed during PCU rounds. Decreased appetite reported by nursing. Offered a variety of supplements to patient; she states she can't have milkshakes but unable to tell me why. She reports liking juice when asked so will try ensure clear at this time. Will monitor appetite and acceptance of supplements. Thanks.   Juan Pablo Leger, [address] [place] [address]  Pager [phone]

## 2017-06-20 NOTE — PROGRESS NOTES
Pressure Ulcer Prevention In basket Alert Received for Vinny < 14 (moderate risk).      Suggested Care Plan/Interventions for Nursing  1. Complete Vinny Pressure Ulcer Risk Scale and use sub scores to identify appropriate interventions. 2. Perform Assessment: skin, changes in LOC, visual cues for pain, monitor skin under medical devices  3. Respond to Reduced Sensory Perception: changes in LOC, check visual cues for pain, float heels, suspension boots, pressure redistribution bed/mattress/chair cushion, turning and reposition approximately every 2 hours (pillows & wedges), pad between skin to skin, turn & reposition  4. Manage Moisture: absorbent under pads, internal / external urinary device, internal /  external fecal device, minimize layers, contain wound drainage, access need for specialty bed, limit adult briefs, maintain skin hydration (lotion/cream), moisture barrier, offer toileting every hour  5. Promote Activity: increase time out of bed, chair cushion, PT/OT evaluation, trapeze to reposition, pressure redistribution bed/mattress/chair  6. Address Reduced Mobility: float heels / suspension boot, HOB 30 degrees or less, pressure redistribution bed/mattress/cushion, PT / OT evaluation, turn and reposition approximately every 2 hours (pillows & wedges)  7. Promote Nutrition: document food / fluid / supplement intake, encourage/assist with meals as needed  8. Reduce Friction and Shear: transferring/repositioning devices (lift/draw sheet), lift team/ patient mobility team, feet elevated on foot rest, minimize layers, foam dressing / transparent film / skin sealants, protective barrier creams and emollients, transfer aides (board, Josh lift, ceiling lift, stand assist), HOB 30 degrees or less, trapeze to reposition.   Wound Care Team

## 2017-06-20 NOTE — PROGRESS NOTES
Hospitalist Progress Note    NAME: Tiffany Cao   :  8/15/1911   MRN:  [id number]       Assessment / Plan:  Acute hypoxic respiratory failure with acute pulmonary edema in setting of Acute systolic heart failure EF 20-25% / NSTEMI / Chronic afib  -ECHO 17 with EF 84%, grade 3 diastolic dysfunction. -ECHO 17 with EF 20-25%. hypokinesis of the mid anteroseptal and apical septal walls. DOMINGO. Mod-sever MR; mod AS; mod to severe TR  -pro BNP >83522  -Adm cxr with There is increased haziness in the midlungs and bibasilar  atelectasis/effusions consistent with moderate edema.  -Troponin Peak 3.24 on .  -Not a procedural candidate due to age and pts wishes. Poor candidate for ICD placement. -Medical management per cardiology. Continue ASA, plavix, BB, statin, ntg. No Acei with poor renal function. Was on therapeutic Lovenox x 48 hours. Now on dvt proph.  -continue diuresis with IV lasix 40mg bid. At home on 20mg bid.  -Close monitoring of fluid status with strict I&O, daily weight  -Continue supplemental o2 rpn, wean as tolerating. prn bipap  -monitor lytes replete prn, not anticoagulant candidate per cards.  -cardiology following, recs appreciated. Still requiring 2LNC, will need Home O2 from now on, will repeat CXR today  Lactic acidosis   -Likely due to above, due to poor perfusion state   -resolved   CKD stage III  -Cr stable at baseline, monitor, decrease dose of diuretic  -Avoid nephrotoxic drugs, adjust all meds to GFR. Anemia of chronic disease   -Hb stable  -Monitor closely   Moderate protein calory malnutrition, albumin 2.5   Hyperlipidemia, cont home statin   H/o stroke, cont ASA/Plavix   Body mass index is 25.65 kg/(m^2).   Code status: [address] [place] [address] [id number]  Prophylaxis: Lovenox  Recommended Disposition: SNF[facility]TC [facility]va in 2017    Plan to D/c tomorrow back to SNF as per GDTR     Subjective:     Chief Complaint / Reason for Physician Visit  \"I feel better\". Discussed with RN events overnight. Review of Systems:  Symptom Y/N Comments  Symptom Y/N Comments   Fever/Chills n   Chest Pain n    Poor Appetite    Edema y    Cough n   Abdominal Pain n    Sputum    Joint Pain     SOB/MCNAMARA y   Pruritis/Rash     Nausea/vomit n   Tolerating PT/OT     Diarrhea    Tolerating Diet y    Constipation    Other       Could NOT obtain due to:      Objective:     VITALS:   Last 24hrs VS reviewed since prior progress note. Most recent are:  Patient Vitals for the past 24 hrs:   Temp Pulse Resp BP SpO2   06/20/17 0744 97.3 °F (36.3 °C) 62 18 139/76 100 %   06/20/17 0345 98 °F (36.7 °C) 84 18 151/86 100 %   06/19/17 2353 97.5 °F (36.4 °C) 78 18 (!) 117/96 100 %   06/19/17 1952 98.1 °F (36.7 °C) 78 18 132/71 100 %   06/19/17 1530 98.1 °F (36.7 °C) 81 18 114/62 100 %   06/19/17 1138 98.2 °F (36.8 °C) 83 18 120/56 100 %       Intake/Output Summary (Last 24 hours) at 06/20/17 1049  Last data filed at 06/19/17 1937   Gross per 24 hour   Intake              360 ml   Output              500 ml   Net             -140 ml        PHYSICAL EXAM:  General: WD, WN. Alert, cooperative, no acute distress    EENT:  EOMI. Anicteric sclerae. MMM  Resp:  wheezing. No accessory muscle use  CV:  irregular  rhythm,  No  edema  GI:  Soft, Non distended, Non tender.  +Bowel sounds  Neurologic:  Alert and oriented X 2 to name and in hospital, normal speech,   Psych:   Fair insight. Not anxious nor agitated  Skin:  No rashes.   No jaundice    Reviewed most current lab test results and cultures  YES  Reviewed most current radiology test results   YES  Review and summation of old records today    NO  Reviewed patient's current orders and MAR    YES  PMH/SH reviewed - no change compared to H&P  ________________________________________________________________________  Care Plan discussed with:    Comments   Patient y    Family  y [id number] RN y    Care Manager     Consultant Multidiciplinary team rounds were held today with , nursing, pharmacist and clinical coordinator. Patient's plan of care was discussed; medications were reviewed and discharge planning was addressed. ________________________________________________________________________  Total NON critical care TIME:  30   Minutes    Total CRITICAL CARE TIME Spent:   Minutes non procedure based      Comments   >50% of visit spent in counseling and coordination of care     ________________________________________________________________________  Gina Gerber MD     Procedures: see electronic medical records for all procedures/Xrays and details which were not copied into this note but were reviewed prior to creation of Plan. LABS:  I reviewed today's most current labs and imaging studies.   Pertinent labs include:  Recent Labs      06/19/17 0229 06/18/17   0342   WBC  3.5*  3.8   HGB  9.0*  8.4*   HCT  27.8*  25.2*   PLT  253  232     Recent Labs      06/20/17   0238  06/19/17 0229 06/18/17   0342   NA  139  142  142   K  4.0  3.8  3.7   CL  107  108  109*   CO2  23  23  21   GLU  125*  88  71   BUN  40*  39*  41*   CREA  2.53*  2.30*  2.18*   CA  8.5  8.3*  8.3*   MG  2.0  2.1  2.2       Signed: Gina Gerber MD

## 2017-06-20 NOTE — PROGRESS NOTES
Problem: Self Care Deficits Care Plan (Adult)  Goal: *Acute Goals and Plan of Care (Insert Text)  Occupational Therapy Goals  Initiated 6/20/2017  1. Patient will perform grooming with minimal assistance/contact guard assist within 7 day(s). 2. Patient will perform upper body dressing with minimal assistance/contact guard assist within 7 day(s). 3. Patient will perform lower body dressing with moderate assistance within 7 day(s). 4. Patient will perform toilet transfers with minimal assistance/contact guard assist within 7 day(s). 5. Patient will perform all aspects of toileting with minimal assistance/contact guard assist within 7 day(s). 6. Patient will participate in L upper extremity therapeutic exercise/activities with neuro re-ed (CVA 3/2017) with modified independence for 5 minutes within 7 day(s). 7. Patient will utilize energy conservation techniques during functional activities with verbal cues within 7 day(s). OCCUPATIONAL THERAPY EVALUATION  Patient: Milla Norman (89 y.o. female)  Date: 6/20/2017  Primary Diagnosis: Pulmonary edema  NSTEMI (non-ST elevated myocardial infarction) [facility])        Precautions:   DNR, Fall, Bed Alarm      ASSESSMENT :  Based on the objective data described below, the patient presents with decreased endurance, strength, balance, and activity tolerance. MCNAMARA with seated activity with forward reaching to LE on 2L O2. Currently max A for LB ADLs to mod A to setup for UB ADLs, [place] but following commands. Completed functional transfer training with PT with fair balance MOD Ax2, with some fear of falling. Granddaughter assists with care per patient. Recommend return to rehab for cardiac endurance building to maximize independence with ADLs and functional mobility prior to return to [facility], will need assist with ADLs. Patient will benefit from skilled intervention to address the above impairments.   Patients rehabilitation potential is considered to be Fair  Factors which may influence rehabilitation potential include:   [X]             None noted  [ ]             Mental ability/status  [ ]             Medical condition  [ ]             Home/family situation and support systems  [ ]             Safety awareness  [ ]             Pain tolerance/management  [ ]             Other:        PLAN :  Recommendations and Planned Interventions:  [X]               Self Care Training                  [X]        Therapeutic Activities  [X]               Functional Mobility Training    [ ]        Cognitive Retraining  [X]               Therapeutic Exercises           [X]        Endurance Activities  [X]               Balance Training                   [X]        Neuromuscular Re-Education  [ ]               Visual/Perceptual Training     [ ]   Home Safety Training  [X]               Patient Education                 [ ]        Family Training/Education  [ ]               Other (comment):     Frequency/Duration: Patient will be followed by occupational therapy 3 times a week to address goals. Discharge Recommendations: Skilled Nursing Facility  Further Equipment Recommendations for Discharge: TBD at facility, was using wc and RW       SUBJECTIVE:   Patient stated I rode a bicycle for the first time.       OBJECTIVE DATA SUMMARY:   HISTORY:   Past Medical History:   Diagnosis Date    Angina, class II ([facility] [address] 3/5/2013    ARF (acute renal failure) (HCC) 12/19/2010    Atrial fibrillation (HCC) 7/28/2014    Bleeding per rectum 11/6/2009    Coronary atherosclerosis of native coronary artery 5/7/2011    DJD (degenerative joint disease)      DNR (do not resuscitate) 11/29/2011    Gout 11/6/2009    High cholesterol 11/6/2009    Hypertension      IHD (ischemic heart disease) 11/6/2009    Kidney disease, chronic, stage III (moderate, EGFR 30-59 ml/min) 11/6/2009    Kidney disease, chronic, stage III (moderate, EGFR 30-59 ml/min) 11/6/2009    Obesity      TIA (transient ischemic attack) 1/14/2011 Past Surgical History:   Procedure Laterality Date    COLONOSCOPY   4/11/2005      Dr. Ashley Squires HX CHOLECYSTECTOMY   11/94    HX HEENT        [facility]        Prior Level of Function/Home Situation: was in [facility] prior to March 2017, CVA with L side weakness 3/2017 and transferred to [facility], was going to transfer to [facility] but with chest pain, was using RW and wc for mobility, with assist for LB and UB ADLs at rehab per patient  Expanded or extensive additional review of patient history:       Home Situation  Home Environment: [address] [place] [address] [facility] [facility]? # Steps to Enter: 0  One/Two Story Residence: One story  Living Alone: No  Support Systems: Child(kandace), Assisted living, /, Skilled nursing facility  Patient Expects to be Discharged to[de-identified] Skilled nursing facility  Current DME Used/Available at Home: Wheelchair  [X]  Right hand dominant             [ ]  Left hand dominant     EXAMINATION OF PERFORMANCE DEFICITS:  Cognitive/Behavioral Status:  Neurologic State: Alert  Orientation Level: Oriented to person;Oriented to place  Cognition: Appropriate decision making; Follows commands  Perception: Appears intact  Perseveration: No perseveration noted  Safety/Judgement: Decreased insight into deficits; Awareness of environment     Skin: intact     Edema: none noted     Hearing: Auditory  Auditory Impairment: Hard of hearing, bilateral     Vision/Perceptual:                      Diplopia: No    Acuity: Impaired near vision; Impaired far vision    Corrective Lenses: Glasses     Range of Motion:  R UE shoulder flexion to 90* limited B shoulder ROM  R hand intact  L UE 1/2 composite flexion/extension, 1/2 strength,   L shoulder flexion to 45* flexion, able to flex elbow to touch face for grooming/eating tasks  AROM: Generally decreased, functional Strength:  B UE limited due to arthritis and L weakness  R UE 3/5  L UE 2/5   1/5  Strength: Grossly decreased, non-functional                 Coordination:  Coordination: Within functional limits  Fine Motor Skills-Upper: Left Impaired;Right Impaired (L CVA weakness, B hand arthritis)          Tone & Sensation:     Tone: Normal                          Balance:  Sitting: Impaired; Without support  Sitting - Static: Good (unsupported)  Sitting - Dynamic: Good (unsupported) (fair with fatigue)  Standing: Impaired; With support  Standing - Static: Poor;Constant support  Standing - Dynamic : Poor     Functional Mobility and Transfers for ADLs:  Bed Mobility:    up in chair upon arrival     Transfers:  Sit to Stand: Moderate assistance;Assist x2  Stand to Sit: Moderate assistance;Assist x2     ADL Assessment:  Feeding: Setup     Oral Facial Hygiene/Grooming: Minimum assistance (requires setup, able to use B hands)     Bathing: Additional time;Maximum assistance (setup)     Upper Body Dressing: Maximum assistance (due to L weakness)     Lower Body Dressing: Maximum assistance (Could remove L sock only, fatigued)     Toileting: Maximum assistance  Meal Preparation:  (due to impaired balance)              ADL Intervention and task modifications:          Completed OT evaluation and ADLs seated EOB and standing as able with assist x2 MOD  for balance. Educated on safety and endurance training with encouragement for full participation in ADLs while in hospital. Good understanding noted. Cognitive Retraining  Safety/Judgement: Decreased insight into deficits; Awareness of environment     Therapeutic Exercise:  encouraged OOB and full participation with ADLs to improve strength and endurance.         Occupational Therapy Evaluation Charge Determination   History Examination Decision-Making   MEDIUM Complexity : Expanded review of history including physical, cognitive and psychosocial  history  MEDIUM Complexity : 3-5 performance deficits relating to physical, cognitive , or psychosocial skils that result in activity limitations and / or participation restrictions MEDIUM Complexity : Patient may present with comorbidities that affect occupational performnce. Miniml to moderate modification of tasks or assistance (eg, physical or verbal ) with assesment(s) is necessary to enable patient to complete evaluation       Based on the above components, the patient evaluation is determined to be of the following complexity level: MEDIUM  Pain:  Pain Scale 1: Numeric (0 - 10)  Pain Intensity 1: 0              Activity Tolerance:   VSS, poor endurance  Please refer to the flowsheet for vital signs taken during this treatment. After treatment:   [X] Patient left in no apparent distress sitting up in chair  [ ] Patient left in no apparent distress in bed  [X] Call bell left within reach  [X] Nursing notified  [ ] Caregiver present  [ ] Bed alarm activated      COMMUNICATION/EDUCATION:   The patients plan of care was discussed with: Physical Therapist, Registered Nurse and Rehabilitation Attendant.  [X] Home safety education was provided and the patient/caregiver indicated understanding. [X] Patient/family have participated as able in goal setting and plan of care. [X] Patient/family agree to work toward stated goals and plan of care. [ ] Patient understands intent and goals of therapy, but is neutral about his/her participation. [ ] Patient is unable to participate in goal setting and plan of care. This patients plan of care is appropriate for delegation to \[facility] [place] [facility]\"".      Thank you for this referral.  Balinda Kehr Hunsucker, WILLIAM  Time Calculation: 16 mins

## 2017-06-20 NOTE — CARDIO/PULMONARY
CP REHAB NOTE    Chart Review: Patient a 8 year old patient admitted with NSTEMI. Medical History: angina, ARF, Afib, CAD, ischemic cardiomyopathy  EF 20-25%, Echo 6/19/2017  Non Smoker    Attempted to meet with patient on several occasions. Staff in room and then rounds were in the patient's room. On third attempt patient was sleeping in the chair. Left folder at the bedside with handouts on MI, Decrease Your Risk of Heart Attack, and Low Sodium Diet. Will continue to follow.

## 2017-06-20 NOTE — PROGRESS NOTES
Problem: Mobility Impaired (Adult and Pediatric)  Goal: *Acute Goals and Plan of Care (Insert Text)  Physical Therapy Goals  Initiated 6/20/2017  1. Patient will move from supine to sit and sit to supine , scoot up and down and roll side to side in bed with minimal assistance/contact guard assist within 7 day(s). 2. Patient will transfer from bed to chair and chair to bed with minimal assistance/contact guard assist using the least restrictive device within 7 day(s). 3. Patient will perform sit to stand with minimal assistance/contact guard assist within 7 day(s). 4. Patient will ambulate with minimal assistance/contact guard assist for 30 feet with the least restrictive device within 7 day(s). PHYSICAL THERAPY EVALUATION  Patient: Bala Youssef (89 y.o. female)  Date: 6/20/2017  Primary Diagnosis: Pulmonary edema  NSTEMI (non-ST elevated myocardial infarction) [facility])        Precautions:  DNR, Fall, Bed Alarm      ASSESSMENT :  Based on the objective data described below, the patient presents with generalized weakness, impaired balance, decreased activity tolerance following admission. Patient admitted from SNF rehab where she was just about to DC to her ABBY. Received at bedside chair, agreeable and pleasant. She mobilized with mod A of 2 overall as noted below, to include x2 brief gait periods limited by weakness as well as audible wheezing and MCNAMARA, although VSS on 2L NCO2. Patient concluded with all needs met. She may require return to SNF at [place] pending improvements in above. Patient will benefit from skilled intervention to address the above impairments.   Patients rehabilitation potential is considered to be Fair  Factors which may influence rehabilitation potential include:   [ ]         None noted  [ ]         Mental ability/status  [X]         Medical condition  [ ]         Home/family situation and support systems  [ ]         Safety awareness  [ ]         Pain tolerance/management  [ ] Other: PLAN :  Recommendations and Planned Interventions:  [X]           Bed Mobility Training             [ ]    Neuromuscular Re-Education  [X]           Transfer Training                   [ ]    Orthotic/Prosthetic Training  [X]           Gait Training                         [ ]    Modalities  [X]           Therapeutic Exercises           [ ]    Edema Management/Control  [X]           Therapeutic Activities            [X]    Patient and Family Training/Education  [ ]           Other (comment):     Frequency/Duration: Patient will be followed by physical therapy  3 times a week to address goals. Discharge Recommendations: Skilled Nursing Facility  Further Equipment Recommendations for Discharge: defer       SUBJECTIVE:   Patient stated kids don't work hard these days.       OBJECTIVE DATA SUMMARY:   HISTORY:    Past Medical History:   Diagnosis Date    Angina, class II ([facility] 3/5/2013    ARF (acute renal failure) ([facility] 12/19/2010    Atrial fibrillation ([facility] 7/28/2014    Bleeding per rectum 11/6/2009    Coronary atherosclerosis of native coronary artery 5/7/2011    DJD (degenerative joint disease)      DNR (do not resuscitate) 11/29/2011    Gout 11/6/2009    High cholesterol 11/6/2009    Hypertension      IHD (ischemic heart disease) 11/6/2009    Kidney disease, chronic, stage III (moderate, EGFR 30-59 ml/min) 11/6/2009    Kidney disease, chronic, stage III (moderate, EGFR 30-59 ml/min) 11/6/2009    Obesity      TIA (transient ischemic attack) 1/14/2011     Past Surgical History:   Procedure Laterality Date    COLONOSCOPY   4/11/2005      Dr. Deanne Rosas HX CHOLECYSTECTOMY   11/94    HX HEENT        [facility]     Prior Level of Function/Home Situation: Admitted from SNF rehab where she was about to DC to an nursing home. She walked short distances only with a RW, otherwise using a WC for mobility. Not on O2. Personal factors and/or comorbidities impacting plan of care:      Home Situation  Home Environment: [address] [place] [address] Road Name: sunrise? # Steps to Enter: 0  One/Two Story Residence: One story  Living Alone: No  Support Systems: Child(kandace), Assisted living, /, Skilled nursing facility  Patient Expects to be Discharged to[de-identified] Skilled nursing facility  Current DME Used/Available at Home: Wheelchair     EXAMINATION/PRESENTATION/DECISION MAKING:   Critical Behavior:  Neurologic State: Alert  Orientation Level: Oriented to person, Oriented to place  Cognition: Appropriate decision making, Follows commands  Safety/Judgement: Decreased insight into deficits, Awareness of environment  Hearing: Auditory  Auditory Impairment: Hard of hearing, bilateral  Range Of Motion:  AROM: Generally decreased, functional                       Strength:    Strength: Grossly decreased, non-functional                    Tone & Sensation:   Tone: Normal                              Coordination:  Coordination: Within functional limits  Vision:   Diplopia: No  Acuity: Impaired near vision; Impaired far vision  Corrective Lenses: Glasses  Functional Mobility:  Bed Mobility:              Transfers:  Sit to Stand: Moderate assistance;Assist x2  Stand to Sit: Moderate assistance;Assist x2                       Balance:   Sitting: Impaired; Without support  Sitting - Static: Good (unsupported)  Sitting - Dynamic: Good (unsupported) (fair with fatigue)  Standing: Impaired; With support  Standing - Static: Poor;Constant support  Standing - Dynamic : Poor  Ambulation/Gait Training:  Distance (ft): 6 Feet (ft) (3'x2 with 1' seated rest between)  Assistive Device: Walker, rolling;Gait belt  Ambulation - Level of Assistance:  Moderate assistance;Assist x2     Gait Description (WDL): Exceptions to WDL  Gait Abnormalities: Decreased step clearance;Shuffling gait        Base of Support: Widened     Speed/Xochitl: Shuffled; Slow  Step Length: Right shortened;Left shortened            VC's for upright gaze      Functional Measure:  Barthel Index:      Bathin  Bladder: 10  Bowels: 10  Groomin  Dressin  Feeding: 10  Mobility: 0  Stairs: 0  Toilet Use: 5  Transfer (Bed to Chair and Back): 10  Total: 55         Barthel and G-code impairment scale:  Percentage of impairment CH  0% CI  1-19% CJ  20-39% CK  40-59% CL  60-79% CM  80-99% CN  100%   Barthel Score 0-100 100 99-80 79-60 59-40 20-39 1-19    0   Barthel Score 0-20 20 17-19 13-16 9-12 5-8 1-4 0      The Barthel ADL Index: Guidelines  1. The index should be used as a record of what a patient does, not as a record of what a patient could do. 2. The main aim is to establish degree of independence from any help, physical or verbal, however minor and for whatever reason. 3. The need for supervision renders the patient not independent. 4. A patient's performance should be established using the best available evidence. Asking the patient, friends/relatives and nurses are the usual sources, but direct observation and common sense are also important. However direct testing is not needed. 5. Usually the patient's performance over the preceding 24-48 hours is important, but occasionally longer periods will be relevant. 6. Middle categories imply that the patient supplies over 50 per cent of the effort. 7. Use of aids to be independent is allowed. Melissa Nobles., Barthel, D.W. (6437). Functional evaluation: the Barthel Index. [facility] (14)2. Latoya Johnson arlin MAGGY Lopez Juana Both., [facility], [facility] (). Measuring the change indisability after inpatient rehabilitation; comparison of the responsiveness of the Barthel Index and Functional [place] Measure. Journal of Neurology, Neurosurgery, and Psychiatry, 66(4), 701-689.   FLOYD Griffin.MONTEZ, LUDIVINA Mohamud, & Bacilio Tompkins M.A. (2004.) Assessment of post-stroke quality of life in cost-effectiveness studies: The usefulness of the Barthel Index and the EuroQoL-5D. Quality of Life Research, 13, 352-00            G codes: In compliance with CMSs Claims Based Outcome Reporting, the following G-code set was chosen for this patient based on their primary functional limitation being treated: The outcome measure chosen to determine the severity of the functional limitation was the Barthel with a score of 55/100 which was correlated with the impairment scale. · Mobility - Walking and Moving Around:               - CURRENT STATUS:    CK - 40%-59% impaired, limited or restricted               - GOAL STATUS:           CJ - 20%-39% impaired, limited or restricted               - D/C STATUS:                       ---------------To be determined---------------         Pain:  Pain Scale 1: Numeric (0 - 10)  Pain Intensity 1: 0              Activity Tolerance:   Although VSS on 2L NCO2, patient easy to fatigue with MCNAMARA and audible wheezing      Please refer to the flowsheet for vital signs taken during this treatment. After treatment:   [X]         Patient left in no apparent distress sitting up in chair  [ ]         Patient left in no apparent distress in bed  [X]         Call bell left within reach  [X]         Nursing notified  [ ]         Caregiver present  [X]         Bed alarm activated      COMMUNICATION/EDUCATION:   The patients plan of care was discussed with: Registered Nurse.  [X]         Fall prevention education was provided and the patient/caregiver indicated understanding. [X]         Patient/family have participated as able in goal setting and plan of care. [X]         Patient/family agree to work toward stated goals and plan of care. [ ]         Patient understands intent and goals of therapy, but is neutral about his/her participation. [ ]         Patient is unable to participate in goal setting and plan of care.      Thank you for this referral.  Delano Brizuela Jessika Fung, PT, DPT    Time Calculation: 19 mins

## 2017-06-20 NOTE — PROGRESS NOTES
PCU SHIFT NURSING NOTE      Bedside shift change report given to Eligio Perez (oncoming nurse) by Gia Boyd (offgoing nurse). Report included the following information SBAR, Kardex, Procedure Summary and Intake/Output. Shift Summary:       Admission Date 6/17/2017   Admission Diagnosis Pulmonary edema  NSTEMI (non-ST elevated myocardial infarction) ([facility] [address]   Consults IP CONSULT TO CARDIOLOGY  IP CONSULT TO PULMONOLOGY        Consults   []PT   []OT   []Speech   []Case Management      [] Palliative      Cardiac Monitoring Order   []Yes   []No     IV drips   []Yes    Drip:                            Dose:  Drip:                            Dose:  Drip:                            Dose:   []No     GI Prophylaxis   []Yes   []No         DVT Prophylaxis   SCDs:             Jed stockings:         [] Medication   []Contraindicated   []None      Activity Level Activity Level: Up with Assistance     Activity Assistance: Partial (two people)   Purposeful Rounding every 1-2 hour? []Yes   Thomas Score  Total Score: 4   Bed Alarm (If score 3 or >)   []Yes   [] Refused (See signed refusal form in chart)   Vinny Score  Vinny Score: 14   Vinny Score (if score 14 or less)   []PMT consult   []Wound Care consult      []Specialty bed   [] Nutrition consult          Needs prior to discharge:   Home O2 required:    []Yes   []No    If yes, how much O2 required?     Other:    Last Bowel Movement: Last Bowel Movement Date: 06/19/17      Influenza Vaccine Received Flu Vaccine for Current Season (usually Sept-March): Not Flu Season        Pneumonia Vaccine           Diet Active Orders   Diet    DIET MECHANICAL SOFT      LDAs               Peripheral IV 06/20/17 Right Forearm (Active)   Site Assessment Clean, dry, & intact 6/20/2017 11:57 AM   Phlebitis Assessment 0 6/20/2017 11:57 AM   Infiltration Assessment 0 6/20/2017 11:57 AM   Dressing Status Clean, dry, & intact 6/20/2017 11:57 AM   Dressing Type Transparent;Tape 6/20/2017 11:57 AM   Hub Color/Line Status Pink;Capped;Flushed;Patent 6/20/2017 11:57 AM                      Urinary Catheter      Intake & Output   Date 06/19/17 0700 - 06/20/17 0659 06/20/17 0700 - 06/21/17 0659   Shift 0700-1859 1900-0659 24 Hour Total 0700-1859 1900-0659 24 Hour Total   I  N  T  A  K  E   P.O. 360 240 600         P. O. 360 240 600       Shift Total  (mL/kg) 360  (5.6) 240  (3.7) 600  (9.1)      O  U  T  P  U  T   Urine  (mL/kg/hr) 500  (0.6)  500  (0.3)         Urine Voided 500  500         Urine Occurrence(s) 2 x 1 x 3 x       Stool            Stool Occurrence(s) 2 x  2 x       Shift Total  (mL/kg) 500  (7.8)  500  (7.6)      NET -140 240 100      Weight (kg) 64.3 65.7 65.7 65.7 65.7 65.7         Readmission Risk Assessment Tool Score Medium Risk            14       Total Score        3 Relationship with PCP    2 Patient Living Status    4 More than 1 Admission in calendar year    5 Patient Insurance is Medicare, Medicaid or Self Pay        Criteria that do not apply:    Patient Length of Stay > 5    Charlson Comorbidity Score       Expected Length of Stay 4d 12h   Actual Length of Stay 3

## 2017-06-20 NOTE — PROGRESS NOTES
CM Initial Assessment        Current admission assessment-- Patient came from [facility] [facility] to ed with sob, Per rehab facility states patient was complaining of chest pain. Spoke with patient's grand daughter Tomasa Blanchard and she states prior to patient being at rehab she was at the [facility] which is an assisted living. She states the staff there assisted her with getting dressed and did her meals and she would go to the dining area to eat,. She sees her md Dr Jessica Welch at the assisted living facility. She states patient has wheelchair and walker at the assisted living. She does not use oxygen. Patient is interested in going back to [address] [facility] for more rehab and grand daughter is in agreement. Referral sent via cc link to [facility] and they have accepted patient back when medically stable. Care Management Interventions  PCP Verified by CM: Yes  Discharge Durable Medical Equipment:  (Patient has wheelchair and walker at the assisted living. )  Physical Therapy Consult: Yes  Occupational Therapy Consult: Yes  Current Support Network:  Other (Patient was receiving rehab short term )  Confirm Follow Up Transport: Family  Plan discussed with Pt/Family/Caregiver: Yes  Discharge Location  Discharge Placement: Skilled nursing facility                       Echo RNBSNCRM [id number]

## 2017-06-20 NOTE — PROGRESS NOTES
[address] [place] [address]  [phone]      Cardiology Progress Note      6/20/2017 10AM    Admit Date: 6/17/2017    Admit Diagnosis:   Pulmonary edema  NSTEMI (non-ST elevated myocardial infarction) [facility])    Subjective:     Ta Arauz is a 80 y.o. female who was admitted with NSTEMI. Overnight events:  -VSS  -weight up 3#, but the bed weight may be inaccurate   -creat 2.53  -Ms. Brenden Grey states she \"feel pretty good\". She denies nausea, headache, chest pain, SOB, or palpitations.       Visit Vitals    /76 (BP 1 Location: Left arm, BP Patient Position: At rest)    Pulse 62    Temp 97.3 °F (36.3 °C)    Resp 18    Ht 5' 3\" (1.6 m)    Wt 65.7 kg (144 lb 12.8 oz)    SpO2 100%    Breastfeeding No    BMI 25.65 kg/m2       Current Facility-Administered Medications   Medication Dose Route Frequency    enoxaparin (LOVENOX) injection 30 mg  30 mg SubCUTAneous DAILY    albuterol-ipratropium (DUO-NEB) 2.5 MG-0.5 MG/3 ML  3 mL Nebulization Q6H PRN    aspirin delayed-release tablet 81 mg  81 mg Oral DAILY    carvedilol (COREG) tablet 12.5 mg  12.5 mg Oral BID WITH MEALS    cholecalciferol (VITAMIN D3) tablet 1,000 Units  1,000 Units Oral DAILY    clopidogrel (PLAVIX) tablet 75 mg  75 mg Oral DAILY    potassium chloride SR (KLOR-CON 10) tablet 10 mEq  10 mEq Oral BID    pravastatin (PRAVACHOL) tablet 20 mg  20 mg Oral QHS    traMADol (ULTRAM) tablet 50 mg  50 mg Oral Q8H PRN    sodium chloride (NS) flush 5-10 mL  5-10 mL IntraVENous Q8H    sodium chloride (NS) flush 5-10 mL  5-10 mL IntraVENous PRN    acetaminophen (TYLENOL) tablet 650 mg  650 mg Oral Q4H PRN    ondansetron (ZOFRAN) injection 4 mg  4 mg IntraVENous Q4H PRN    furosemide (LASIX) injection 40 mg  40 mg IntraVENous BID    nitroglycerin (NITROBID) 2 % ointment 1 Inch  1 Inch Topical Q6H       Objective:      Physical Exam:  General Appearance:  elderly AAF sitting in chair in NAD  Chest:   Clear  Cardiovascular:  Regular rate and rhythm, no murmur.   Abdomen:   Soft, non-tender, bowel sounds are active.   Extremities: palpable distal pulses, no edema. Skin:  Warm and dry.     Data Review:   Recent Labs      06/19/17 0229 06/18/17 0342   WBC  3.5*  3.8   HGB  9.0*  8.4*   HCT  27.8*  25.2*   PLT  253  232     Recent Labs      06/20/17   0238  06/19/17 0229 06/18/17 0342   NA  139  142  142   K  4.0  3.8  3.7   CL  107  108  109*   CO2  23  23  21   GLU  125*  88  71   BUN  40*  39*  41*   CREA  2.53*  2.30*  2.18*   CA  8.5  8.3*  8.3*   MG  2.0  2.1  2.2       Recent Labs      06/18/17 0342   TROIQ  2.63*         Intake/Output Summary (Last 24 hours) at 06/20/17 1052  Last data filed at 06/19/17 1937   Gross per 24 hour   Intake              360 ml   Output              500 ml   Net             -140 ml        Telemetry: A-fib 70s  EKG: a-fib  Cxray: \"IMPRESSION: There is increased haziness in the midlungs and bibasilar  atelectasis/effusions consistent with moderate edema. \"  ECHO: EF 20-25%; hypokinesis of the mid anteroseptal and apical septal walls. DOMINGO. Mod-severe MR; mod AS; mod to severe TR    Assessment:     Principal Problem:    NSTEMI (non-ST elevated myocardial infarction) ([facility] (6/17/2017)    Active Problems:    Atrial fibrillation ([facility] (7/28/2014)      Pulmonary edema (6/17/2017)      Ischemic cardiomyopathy (6/19/2017)        Plan:     NSTEMI:  Patient wants medical management. ECHO with greatly reduced EF of 20-25%. · Continue ASA, BB, statin, nitrate, plavix, lasix    · Will wait to add any ACEi at this time due to kidney function   · Patient poor candidate to consider any future ICD placement at her age    A-fib: rate controlled. Not an anticoagulant candidate  · Continue ASA and BB      No changes in recommendations today. Promising that patient is feeling a lot better today. Will need to watch kidney function closely.        Edy Spann NP  DNP, RN, AGACNP-BC          [facility]    6/20/2017         Agree with note as outlined by  NP. I confirm findings in history and physical exam. No additional findings noted. Agree with plan as outlined above.      [address] Samaniego MD

## 2017-06-21 NOTE — DISCHARGE INSTRUCTIONS
HOSPITALIST DISCHARGE INSTRUCTIONS    NAME: Angi Naidu   :  8/15/1911   MRN:  [id number]     Date/Time:  2017 7:53 AM    ADMIT DATE: 2017   DISCHARGE DATE: 2017     Attending Physician: Nancy Gonzalez MD    DISCHARGE DIAGNOSIS:  Respiratory Failure, Pulmonary Edema, NSTEMI, CHF, Chronic A. Fib, CKD, Anemia, Malnutrition, Hyperlipidemia, H/O CVA      Medications: Per above medication reconciliation. Pain Management: per above medications    Recommended diet: Cardiac Diet    Recommended activity: Activity as tolerated    Wound care: None    Indwelling devices:  None    Supplemental Oxygen: 2 LNC,  wean as tolerated    Required Lab work: BMP weekly    Glucose management:  None    Code status: DNR      CHF specific discharge instructions    Weight:  · Daily weights, Notify your Doctor if Wt gain of 3 lb in a day or 5 lb in a week  · Daily Intake & Output      Diet :  · Low salt cardiac diet   · Fluid restriction of 1500 ml daily                Outside physician follow up: Follow-up Information     Follow up With Details Comments Contact Info    [address] [place] [address] will follow you for short term rehab.  [address] [place] [address] [address]LLA [address] [facility] [address]      [facility] [address]  [address] [address] [phone]        F/U PCP  F/U Cardiology         Skilled nursing facility/ SNF MD responsible for above on discharge. Information obtained by :  I understand that if any problems occur once I am at home I am to contact my physician. I understand and acknowledge receipt of the instructions indicated above.                                                                                                                                            Physician's or R.N.'s Signature                                                                  Date/Time Patient or Repres

## 2017-06-21 NOTE — PROGRESS NOTES
Patient is being discharged back to [facility] for short term rehab today. Spoke with her grand daughter , Boubacar Campos and she is aware of discharge and in agreement. [facility] will schedule patient's follow up appointment. Patient will be going to room 104a and nursing to call report to . Referral sent via ecin to [facility] and they will pick patient up at 1400pm today to take her to [facility]. They are aware she will need oxygen 2 liters per min on transport. PCS completed with all pertinent paperwork and given to nursing.

## 2017-06-21 NOTE — PROGRESS NOTES
PCU SHIFT NURSING NOTE      Bedside shift change report given to Yoselyn Ruiz (oncoming nurse) by Gia Boyd (offgoing nurse). Report included the following information SBAR, Kardex, Intake/Output, MAR and Recent Results. Shift Summary: 0745  Pt resting in bed with no complaints  A&O, is unsure of date/time  Probable discharge back to [facility] today    0845  Pt in recliner    1130  Discharge orders in    1250  Called report to Tamie Inman at [address] at 1400     Admission Date 6/17/2017   Admission Diagnosis Pulmonary edema  NSTEMI (non-ST elevated myocardial infarction) ([facility] [address]   Consults IP CONSULT TO CARDIOLOGY  IP CONSULT TO PULMONOLOGY        Consults   []PT   []OT   []Speech   []Case Management      [] Palliative      Cardiac Monitoring Order   []Yes   []No     IV drips   []Yes    Drip:                            Dose:  Drip:                            Dose:  Drip:                            Dose:   []No     GI Prophylaxis   []Yes   []No         DVT Prophylaxis   SCDs:             Jed stockings:         [] Medication   []Contraindicated   []None      Activity Level Activity Level: Up with Assistance     Activity Assistance: Partial (two people)   Purposeful Rounding every 1-2 hour? []Yes   Thomas Score  Total Score: 4   Bed Alarm (If score 3 or >)   []Yes   [] Refused (See signed refusal form in chart)   Vinny Score  Vinny Score: 14   Vinny Score (if score 14 or less)   []PMT consult   []Wound Care consult      []Specialty bed   [] Nutrition consult          Needs prior to discharge:   Home O2 required:    []Yes   []No    If yes, how much O2 required?     Other:    Last Bowel Movement: Last Bowel Movement Date: 06/20/17      Influenza Vaccine Received Flu Vaccine for Current Season (usually Sept-March): Not Flu Season        Pneumonia Vaccine           Diet Active Orders   Diet    DIET MECHANICAL SOFT      LDAs               Peripheral IV 06/20/17 Right Forearm (Active)   Site Assessment Clean, dry, & intact 6/21/2017  7:28 AM   Phlebitis Assessment 0 6/21/2017  7:28 AM   Infiltration Assessment 0 6/21/2017  7:28 AM   Dressing Status Clean, dry, & intact 6/21/2017  7:28 AM   Dressing Type Transparent 6/21/2017  7:28 AM   Hub Color/Line Status Pink 6/21/2017  7:28 AM                      Urinary Catheter      Intake & Output   Date 06/20/17 0700 - 06/21/17 0659 06/21/17 0700 - 06/22/17 0659   Shift 4982-8562 4104-7665 24 Hour Total 4773-6713 4613-8567 24 Hour Total   I  N  T  A  K  E   P.O. 480  480         P. O. 480  480       Shift Total  (mL/kg) 480  (7.3)  480  (7.5)      O  U  T  P  U  T   Shift Total  (mL/kg)           480      Weight (kg) 65.7 64.3 64.3 64.3 64.3 64.3         Readmission Risk Assessment Tool Score Medium Risk            14       Total Score        3 Relationship with PCP    2 Patient Living Status    4 More than 1 Admission in calendar year    5 Patient Insurance is Medicare, Medicaid or Self Pay        Criteria that do not apply:    Patient Length of Stay > 5    Charlson Comorbidity Score       Expected Length of Stay 4d 12h   Actual Length of Stay 4

## 2017-06-21 NOTE — DISCHARGE SUMMARY
Hospitalist Discharge Summary     Patient ID:  Treasure Burgos  [id number]  89 y.o.  8/15/1911    PCP on record: Kourtney Ribeiro MD    Admit date: 6/17/2017  Discharge date and time: 6/21/2017      DISCHARGE DIAGNOSIS:    Respiratory Failure, Pulmonary Edema, NSTEMI, CHF, Chronic A. Fib, CKD, Anemia, Malnutrition, Hyperlipidemia, H/O CVA      CONSULTATIONS:  IP CONSULT TO CARDIOLOGY  IP CONSULT TO PULMONOLOGY    Excerpted HPI from H&P of Kathy Soares MD:  Prabhu Barney is a 80 y.o.  female who presents with above complaint. Pt is currently on BIPAP and cannot contribute to the history. History was obtained from granddtr at bedside. Pt was very functional and was living alone at [facility] till 03/2017 when she had a stroke. She is currently at [facility]. She has functional significant declining since her stroke. She is mostly WC bound at this time. She was supposed to be DC from SNF yesterday to [facility]. Pt was c/o chest pain the day before yesterday which then resolved. Today pt started with sudden dyspnea. On EMS arrival pt was in severe respiratory distress and hypoxic with O2 at low 80th. She was placed on NRB 15L by EMS. On arrival to ED pt continue to be in significant respiratory distress. She was placed on BIPAP. Vs: 99 °F (37.2 °C) - 121 - 160/114 - 25 - 100% BIPAP   We were asked to admit for work up and evaluation of the above problems. ______________________________________________________________________  DISCHARGE SUMMARY/HOSPITAL COURSE:  for full details see H&P, daily progress notes, labs, consult notes. Acute hypoxic respiratory failure with acute pulmonary edema in setting of Acute systolic heart failure EF 20-25% / NSTEMI / Chronic afib  -ECHO 4/28/17 with EF 00%, grade 3 diastolic dysfunction. -ECHO 6/19/17 with EF 20-25%. hypokinesis of the mid anteroseptal and apical septal walls. DOMINGO.  Mod-sever MR; mod AS; mod to severe TR  -pro BNP >19928  -Adm cxr with There is increased haziness in the midlungs and bibasilar  atelectasis/effusions consistent with moderate edema.  -Troponin Peak 3.24 on 6/17.  -Not a procedural candidate due to age and pts wishes. Poor candidate for ICD placement. -Medical management per cardiology. Continue ASA, plavix, BB, statin, ntg. No Acei with poor renal function. Was on therapeutic Lovenox x 48 hours. Now on dvt proph.  -continue diuresis with IV lasix 40mg bid. At home on 20mg bid.  -Close monitoring of fluid status with strict I&O, daily weight  -Continue supplemental o2 rpn, wean as tolerating. prn bipap  -monitor lytes replete prn, not anticoagulant candidate per cards.  -cardiology following, recs appreciated. Still requiring 2LNC, will need Home O2 from now on,  repeat CXR shows better aeration  Lactic acidosis   -Likely due to above, due to poor perfusion state   -resolved   CKD stage III  -Cr stable at baseline, monitor, decrease dose of diuretic  -Avoid nephrotoxic drugs, adjust all meds to GFR. Anemia of chronic disease   -Hb stable  -Monitor closely   Moderate protein calory malnutrition, albumin 2.5   Hyperlipidemia, cont home statin   H/o stroke, cont ASA/Plavix   Body mass index is 25.65 kg/(m^2). Code status: DDNR  [phone] [id number]  Prophylaxis: Lovenox  Recommended Disposition: SNF[facility]va in march 2017     D/c to SNF today and F/U with PCP and Cardiology as outpatient    _______________________________________________________________________  Patient seen and examined by me on discharge day. Pertinent Findings:  Gen:    Not in distress  Chest: Coarse BS  CVS:   Regular rhythm.   No edema  Abd:  Soft, not distended, not tender  Neuro:  Alert, GCS 15  _______________________________________________________________________  DISCHARGE MEDICATIONS:   Current Discharge Medication List      START taking these medications    Details   acetaminophen (TYLENOL) 325 mg tablet Take 2 Tabs by mouth every four (4) hours as needed. Qty: 40 Tab, Refills: 0      potassium chloride SR (KLOR-CON 10) 10 mEq tablet Take 1 Tab by mouth daily. Qty: 30 Tab, Refills: 1         CONTINUE these medications which have CHANGED    Details   traMADol (ULTRAM) 50 mg tablet Take 1 Tab by mouth every eight (8) hours as needed for Pain. Max Daily Amount: 150 mg.  Qty: 40 Tab, Refills: 0      furosemide (LASIX) 40 mg tablet Take 1 Tab by mouth daily. Qty: 30 Tab, Refills: 1         CONTINUE these medications which have NOT CHANGED    Details   isosorbide mononitrate ER (IMDUR) 30 mg tablet Take 30 mg by mouth daily. pravastatin (PRAVACHOL) 20 mg tablet Take 20 mg by mouth nightly. Indications: hypercholesterolemia      cholecalciferol (VITAMIN D3) 1,000 unit tablet Take 1,000 Units by mouth daily. clopidogrel (PLAVIX) 75 mg tablet TAKE 1 TABLET BY MOUTH DAILY. Qty: 30 Tab, Refills: 11      aspirin delayed-release 81 mg tablet Take 81 mg by mouth daily. Associated Diagnoses: RIND (reversible ischemic neurologic deficit), acute ([facility] [address] Coronary atherosclerosis of native coronary artery      magnesium chloride (MAG DELAY) 64 mg tablet Take 64 mg by mouth daily. colchicine 0.6 mg tablet Take 0.6 mg by mouth daily as needed (gout). loperamide (IMODIUM) 2 mg capsule Take 2 mg by mouth every six (6) hours as needed for Diarrhea.      carvedilol (COREG) 12.5 mg tablet TAKE 1 AND 1/2 TABLETS BY MOUTH TWICE DAILY WITH MEALS  Qty: 90 Tab, Refills: 5         STOP taking these medications       POTASSIUM CHLORIDE SR 10 MEQ TAB Comments:   Reason for Stopping:               My Recommended Diet, Activity, Wound Care, and follow-up labs are listed in the patient's Discharge Insturctions which I have personally completed and reviewed.     _______________________________________________________________________  DISPOSITION:    Home with Family:    Home with HH/PT/OT/RN:    SNF/LTC: y   TRENT:    OTHER: Condition at Discharge:  Stable  _______________________________________________________________________  Follow up with:   PCP : Jean Newton MD  Follow-up Information     Follow up With Details Comments [facility] will follow you for short term rehab.  [address]      [place] Hanson  [address] [address] [phone]        F/U PCP  F/U Cardiology        Total time in minutes spent coordinating this discharge (includes going over instructions, follow-up, prescriptions, and preparing report for sign off to her PCP) :  35 minutes    Signed:  Levern Shone, MD

## 2017-07-24 PROBLEM — I50.9 CHF (CONGESTIVE HEART FAILURE) (HCC): Status: ACTIVE | Noted: 2017-01-01

## 2017-07-24 PROBLEM — I50.23 SYSTOLIC CHF, ACUTE ON CHRONIC (HCC): Status: ACTIVE | Noted: 2017-01-01

## 2017-07-24 PROBLEM — J96.90 RESPIRATORY FAILURE (HCC): Status: ACTIVE | Noted: 2017-01-01

## 2017-07-24 NOTE — ED NOTES
TRANSFER - OUT REPORT:    Verbal report given to [address], RN (name) on Leighton Mckeon  being transferred to [facility] (unit) for routine progression of care       Report consisted of patients Situation, Background, Assessment and   Recommendations(SBAR). Information from the following report(s) SBAR, Kardex, ED Summary, Intake/Output, MAR, Recent Results and Med Rec Status was reviewed with the receiving nurse. Lines:   Peripheral IV 07/24/17 Left Antecubital (Active)   Site Assessment Clean, dry, & intact 7/24/2017  8:18 AM   Phlebitis Assessment 0 7/24/2017  8:18 AM   Infiltration Assessment 0 7/24/2017  8:18 AM   Dressing Status Clean, dry, & intact 7/24/2017  8:18 AM        Opportunity for questions and clarification was provided.       Patient transported with:   GIVTED

## 2017-07-24 NOTE — ED PROVIDER NOTES
HPI Comments: Imtiaz Rubin is a 80 y.o. female who presents via EMS to [facility] ED for increased work of breathing and respiratory distress. EMS states that the pt resides at [facility] following a CVA earlier this year. They state that they were called to the facility this morning for the pt who was having difficulty breathing with oxygen saturation around 70% on room air. They state that the pt was tachypneic on arrival, but still responsive. EMS states that pt had diminished lung sounds in the bases bilaterally, prompting administration of DuoNeb x 2. However, pt became increasingly less responsive en route to the ED, prompting application of CPAP at that time. EMS notes improvement in oxygen saturation and responsiveness with CPAP. Of note, per nursing home records, pt is currently a full code. Vladimir Del Rosario MD    PMHx: CVA, NSTEMI, CAD, CKD, hypercholesterolemia, CAD, atrial fibrillation  Social Hx: - smoking; - EtOH; - illicit drug use    HPI and ROS are limited secondary to respiratory distress. The history is provided by the patient and the EMS personnel. The history is limited by the condition of the patient. No  was used.         Past Medical History:   Diagnosis Date    Angina, class II ([address] 3/5/2013    ARF (acute renal failure) ([address] 12/19/2010    Atrial fibrillation (HCC) 7/28/2014    Bleeding per rectum 11/6/2009    Coronary atherosclerosis of native coronary artery 5/7/2011    DJD (degenerative joint disease)     DNR (do not resuscitate) 11/29/2011    Gout 11/6/2009    High cholesterol 11/6/2009    Hypertension     IHD (ischemic heart disease) 11/6/2009    Kidney disease, chronic, stage III (moderate, EGFR 30-59 ml/min) 11/6/2009    Kidney disease, chronic, stage III (moderate, EGFR 30-59 ml/min) 11/6/2009    Obesity     TIA (transient ischemic attack) 1/14/2011       Past Surgical History:   Procedure Laterality Date    COLONOSCOPY  4/11/2005      Best    HX APPENDECTOMY      HX CHOLECYSTECTOMY  11/94    HX HEENT      [facility]  1960    [facility]    THYROIDECTOMY      [facility]         Family History:   Problem Relation Age of Onset    Stroke Other     Hypertension Other     Heart Disease Other        Social History     Social History    Marital status:      Spouse name: N/A    Number of children: N/A    Years of education: N/A     Occupational History    Not on file. Social History Main Topics    Smoking status: Never Smoker    Smokeless tobacco: Never Used    Alcohol use No    Drug use: No    Sexual activity: No     Other Topics Concern    Not on file     Social History Narrative         ALLERGIES: Review of patient's allergies indicates no known allergies. Review of Systems   Unable to perform ROS: Severe respiratory distress       Vitals:    07/24/17 0801 07/24/17 0816 07/24/17 0845   BP:  (!) 150/93 146/67   Pulse: 92  76   Resp: 18  28   Temp: 97.7 °F (36.5 °C)     SpO2: 100%  100%   Weight:  73 kg (160 lb 15 oz)             Physical Exam   Constitutional: She appears well-developed. Nasal cannula in place. HENT:   Head: Normocephalic and atraumatic. Mouth/Throat: Mucous membranes are normal.   Eyes: EOM are normal. Pupils are equal, round, and reactive to light. Neck: Normal range of motion. No JVD present. No tracheal deviation present. Cardiovascular: Normal rate, normal heart sounds and intact distal pulses. An irregularly irregular rhythm present. Exam reveals no gallop and no friction rub. No murmur heard. Pulmonary/Chest: No stridor. Tachypnea noted. She has decreased breath sounds in the left lower field. She has no wheezes. She has rhonchi in the right lower field. She has rales in the right lower field. Abdominal: Soft. Bowel sounds are normal. She exhibits no distension and no mass. There is no tenderness. There is no guarding.    Musculoskeletal:   No peripheral edema Neurological:   Easily aroused to verbal and physical stimuli, nodding yes and no appropriately to questions. Skin: Skin is warm and dry. No rash noted. MDM  Number of Diagnoses or Management Options  Acute on chronic diastolic congestive heart failure ([address]):   Acute respiratory failure with hypoxia [facility]):   Elevated troponin:   Diagnosis management comments: Pt presenting with acute respiratory failure, now improved on CPAP by EMS. DDx includes acs, pneumonia, pulmonary edema, sepsis, arrhythmia, pleural effusion. Will check labs, cardiac enzymes, ekg, pro-bnp, lactic acid. Pt now much improved after CPAP and duonebs. Will trial on home O2, if pt has increased WOB, will put back on BiPAP. Amount and/or Complexity of Data Reviewed  Clinical lab tests: ordered and reviewed  Tests in the radiology section of CPT®: ordered and reviewed  Tests in the medicine section of CPT®: ordered and reviewed  Review and summarize past medical records: yes  Discuss the patient with other providers: yes (Hospitalist)  Independent visualization of images, tracings, or specimens: yes    Critical Care  Total time providing critical care: 30-74 minutes    ED Course       Procedures     CRITICAL CARE NOTE :  7:52 AM    IMPENDING DETERIORATION -Airway and Respiratory    ASSOCIATED RISK FACTORS - Hypoxia    MANAGEMENT- Bedside Assessment and Supervision of Care    INTERPRETATION -  Xrays, ECG and Blood Pressure    INTERVENTIONS - hemodynamic mngmt, vent mngmt and vascular control    CASE REVIEW - Hospitalist, Nursing and Family    TREATMENT RESPONSE -Improved    PERFORMED BY - Self    NOTES   :  I have spent 30 minutes of critical care time involved in lab review, consultations with specialist, family decision- making, bedside attention and documentation. During this entire length of time I was immediately available to the patient .   Sharonda House, DO    EKG interpretation: (Preliminary)  9476  Rhythm: atrial fibrillation; and irregular . Rate (approx.): 93; Axis: normal; QRS interval: normal ; ST/T wave: T wave inversions in anterolateral leads. Written by Tammy Daily ED scribe, as dictated by Janae Alarcon DO    CONSULT NOTE:   9:38 AM  Janae Alarcon DO spoke with Dr. Easton Gonzalez,   Specialty: Hospitalist   Discussed pt's hx, disposition, and available diagnostic and imaging results. Reviewed care plans. Consultant will evaluate and admit pt to the hospital.  Written by Tammy Daily ED Scribe, as dictated by Janae Alarcon DO.    LABORATORY TESTS:  Recent Results (from the past 12 hour(s))   EKG, 12 LEAD, INITIAL    Collection Time: 07/24/17  7:58 AM   Result Value Ref Range    Ventricular Rate 93 BPM    Atrial Rate 94 BPM    QRS Duration 70 ms    Q-T Interval 394 ms    QTC Calculation (Bezet) 489 ms    Calculated R Axis -2 degrees    Calculated T Axis 153 degrees    Diagnosis       Atrial fibrillation  Possible Inferior infarct (cited on or before 21-AUG-2016)  T wave abnormality, consider anterior ischemia  When compared with ECG of 17-JUN-2017 05:00,  No significant change was found     LACTIC ACID    Collection Time: 07/24/17  8:13 AM   Result Value Ref Range    Lactic acid 3.5 (HH) 0.4 - 2.0 MMOL/L   CBC WITH AUTOMATED DIFF    Collection Time: 07/24/17  8:23 AM   Result Value Ref Range    WBC 3.2 (L) 3.6 - 11.0 K/uL    RBC 3.82 3.80 - 5.20 M/uL    HGB 9.8 (L) 11.5 - 16.0 g/dL    HCT 31.6 (L) 35.0 - 47.0 %    MCV 82.7 80.0 - 99.0 FL    MCH 25.7 (L) 26.0 - 34.0 PG    MCHC 31.0 30.0 - 36.5 g/dL    RDW 15.0 (H) 11.5 - 14.5 %    PLATELET 701 048 - 327 K/uL    NEUTROPHILS 42 32 - 75 %    LYMPHOCYTES 51 (H) 12 - 49 %    MONOCYTES 3 (L) 5 - 13 %    EOSINOPHILS 3 0 - 7 %    BASOPHILS 1 0 - 1 %    ABS. NEUTROPHILS 1.3 (L) 1.8 - 8.0 K/UL    ABS. LYMPHOCYTES 1.6 0.8 - 3.5 K/UL    ABS. MONOCYTES 0.1 0.0 - 1.0 K/UL    ABS. EOSINOPHILS 0.1 0.0 - 0.4 K/UL    ABS.  BASOPHILS 0.0 0.0 - 0.1 K/UL   CK W/ CKMB & INDEX Collection Time: 07/24/17  8:23 AM   Result Value Ref Range    CK 59 26 - 192 U/L    CK - MB <1.0 <3.6 NG/ML    CK-MB Index Cannot be calulated 0 - 2.5     TROPONIN I    Collection Time: 07/24/17  8:23 AM   Result Value Ref Range    Troponin-I, Qt. 1.64 (H) <0.05 ng/mL   NT-PRO BNP    Collection Time: 07/24/17  8:23 AM   Result Value Ref Range    NT pro-BNP >05582 (H) 0 - 491 PG/ML   METABOLIC PANEL, COMPREHENSIVE    Collection Time: 07/24/17  8:23 AM   Result Value Ref Range    Sodium 139 136 - 145 mmol/L    Potassium 4.1 3.5 - 5.1 mmol/L    Chloride 103 97 - 108 mmol/L    CO2 29 21 - 32 mmol/L    Anion gap 7 5 - 15 mmol/L    Glucose 105 (H) 65 - 100 mg/dL    BUN 23 (H) 6 - 20 MG/DL    Creatinine 1.98 (H) 0.55 - 1.02 MG/DL    BUN/Creatinine ratio 12 12 - 20      GFR est AA 28 (L) >60 ml/min/1.73m2    GFR est non-AA 23 (L) >60 ml/min/1.73m2    Calcium 8.0 (L) 8.5 - 10.1 MG/DL    Bilirubin, total 0.9 0.2 - 1.0 MG/DL    ALT (SGPT) 9 (L) 12 - 78 U/L    AST (SGOT) 20 15 - 37 U/L    Alk. phosphatase 77 45 - 117 U/L    Protein, total 8.6 (H) 6.4 - 8.2 g/dL    Albumin 2.2 (L) 3.5 - 5.0 g/dL    Globulin 6.4 (H) 2.0 - 4.0 g/dL    A-G Ratio 0.3 (L) 1.1 - 2.2         IMAGING RESULTS:  CXR Results  (Last 48 hours)               07/24/17 0900  XR CHEST PORT Final result    Impression:  IMPRESSION: Moderate pulmonary vascular congestive changes, as described above. Narrative:  INDICATION: Respiratory distress. O2 sats in the 70s on room air. Drowsy. Portable AP upright view of the chest.       Direct comparison made to prior chest x-ray dated June 20, 2017. Cardiomediastinal silhouette is mildly enlarged. There is pulmonary vascular   prominence, bilateral interstitial edema. There are bibasilar densities   consistent with patchy bibasilar airspace disease and small bilateral pleural   effusions. There is no pneumothorax. The osseous structures are diffusely   demineralized, but intact. MEDICATIONS GIVEN:  Medications   furosemide (LASIX) injection 80 mg (not administered)       VITALS (12HOURS):  Patient Vitals for the past 12 hrs:   Temp Pulse Resp BP SpO2   07/24/17 0845 - 76 28 146/67 100 %   07/24/17 0816 - - - (!) 150/93 -   07/24/17 0801 97.7 °F (36.5 °C) 92 18 - 100 %       IMPRESSION:  1. Acute on chronic diastolic congestive heart failure (HCC)    2. Elevated troponin    3. Acute respiratory failure with hypoxia (HCC)        PLAN:  1. Admit to Hospitalist      Admission Note:  9:39 AM  Patient is being admitted to the hospital by Dr. Nayeli Good. The results of their tests and reasons for their admission have been discussed with them and available family. They convey agreement and understanding for the need to be admitted and for their admission diagnosis. This note is prepared by Tunde Acosta, acting as Scribe for Danielle Cramer DO. Danielle Cramer DO: The scribe's documentation has been prepared under my direction and personally reviewed by me in its entirety. I confirm that the note above accurately reflects all work, treatment, procedures, and medical decision making performed by me.

## 2017-07-24 NOTE — H&P
Hospitalist Admission Note    NAME: Tavon Stoner   :  8/15/1911   MRN:  [id number]     Date/Time:  2017 9:39 AM    Patient PCP: Lane Woody MD  ________________________________________________________________________    My assessment of this patient's clinical condition and my plan of care is as follows. Assessment / Plan:  Acute hypoxic respiratory failure with acute pulmonary edema in setting of Acute systolic heart failure EF 20-25% / NSTEMI / Chronic afib  -ECHO 17 with EF 20-25%. hypokinesis of the mid anteroseptal and apical septal walls. DOMINGO. Mod-sever MR; mod AS; mod to severe TR  -pro BNP >81955  -Adm cxr with Moderate pulmonary vascular congestive changes,   -Troponin 1.64   conservative medical management due to her advanced age. Also, family does not wish to pursue any invasive management. does not wish CPR, shock. Will consult cardiology to help with management    NTP   Cont home Plavix/ASA   Cont BB: coreg   Cycle troponin   Will aggressively diureses with lasix IV   Close monitoring of fluid status with strict I&O  Use BIPAP prn ,   Closely monitor electrolytes with aggressive diureses, cont home daily K supplement         Lactic acidosis   Likely due to above, due to poor perfusion state   Monitor closely   Follow la per protocol  Check ua     Chronic Afib  cont home BB        CKD    monitor closely with aggressive diureses.   Avoid nephrotoxic drugs, adjust all meds to GFR.      Anemia of chronic disease no sings of bleeding  Hb stable       Hyperlipidemia, cont home statin   H/o stroke, cont ASA/Plavix               Code Status: DDNR on file, confirmed with family  MPOA: rayo Trivedi [id number]  Cm consult to assist      DVT Prophylaxis: Heparin  GI Prophylaxis: not indicated     Baseline: currently at [facility],[address] uses  wheelchair              Subjective:   CHIEF COMPLAINT: sob    HISTORY OF PRESENT ILLNESS:     Krista Caro is a 81 yo female  who presents with above complaint. cannot contribute to the history. History was obtained from granddtr at bedside  And er records BIB EMS to ED [facility] ED for increased work of breathing and respiratory distress. EMS states that the pt resides at [address] [facility],[address] following a CVA earlier this year. On EMS arrival pt was in severe respiratory distress and hypoxic with O2 at low 80th. She was placed on NRB 15L by EMS. On arrival to ED pt continue to be in significant respiratory distress. She was placed on BIPAP. Now in the er on nasal canula. No reports of fevers or chills or abd pain or n/v. We were asked to admit for work up and evaluation of the above problems. Past Medical History:   Diagnosis Date    Angina, class II ([address] 3/5/2013    ARF (acute renal failure) ([address] 12/19/2010    Atrial fibrillation ([address] 7/28/2014    Bleeding per rectum 11/6/2009    Coronary atherosclerosis of native coronary artery 5/7/2011    DJD (degenerative joint disease)     DNR (do not resuscitate) 11/29/2011    Gout 11/6/2009    High cholesterol 11/6/2009    Hypertension     IHD (ischemic heart disease) 11/6/2009    Kidney disease, chronic, stage III (moderate, EGFR 30-59 ml/min) 11/6/2009    Kidney disease, chronic, stage III (moderate, EGFR 30-59 ml/min) 11/6/2009    Obesity     TIA (transient ischemic attack) 1/14/2011        Past Surgical History:   Procedure Laterality Date    COLONOSCOPY  4/11/2005     Dr. Adali Li HX CHOLECYSTECTOMY  11/94    LILLY [place] [address] [place] [address]       Social History   Substance Use Topics    Smoking status: Never Smoker    Smokeless tobacco: Never Used    Alcohol use No        Family History   Problem Relation Age of Onset    Stroke Other     Hypertension Other     Heart Disease Other      No Known Allergies     Prior to Admission medications    Medication Sig Start Date End Date Taking? Authorizing Provider   acetaminophen (TYLENOL) 325 mg tablet Take 2 Tabs by mouth every four (4) hours as needed. 6/21/17  Yes Leonel Hearn MD   traMADol (ULTRAM) 50 mg tablet Take 1 Tab by mouth every eight (8) hours as needed for Pain. Max Daily Amount: 150 mg. 6/21/17  Yes Leonel Hearn MD   potassium chloride SR (KLOR-CON 10) 10 mEq tablet Take 1 Tab by mouth daily. 6/21/17  Yes Leonel Hearn MD   furosemide (LASIX) 40 mg tablet Take 1 Tab by mouth daily. 6/21/17  Yes Leonel Hearn MD   colchicine 0.6 mg tablet Take 0.6 mg by mouth daily as needed (gout). Yes Historical Provider   isosorbide mononitrate ER (IMDUR) 30 mg tablet Take 30 mg by mouth daily. Yes Historical Provider   loperamide (IMODIUM) 2 mg capsule Take 2 mg by mouth every six (6) hours as needed for Diarrhea. Yes Historical Provider   pravastatin (PRAVACHOL) 20 mg tablet Take 20 mg by mouth nightly. Indications: hypercholesterolemia   Yes Historical Provider   carvedilol (COREG) 12.5 mg tablet TAKE 1 AND 1/2 TABLETS BY MOUTH TWICE DAILY WITH MEALS 6/20/16  Yes Marva Steen MD   aspirin delayed-release 81 mg tablet Take 81 mg by mouth daily. Yes Historical Provider   magnesium chloride (MAG DELAY) 64 mg tablet Take 64 mg by mouth daily. Yes Historical Provider   cholecalciferol (VITAMIN D3) 1,000 unit tablet Take 1,000 Units by mouth daily. Historical Provider   clopidogrel (PLAVIX) 75 mg tablet TAKE 1 TABLET BY MOUTH DAILY. 12/11/15   Marva Steen MD       REVIEW OF SYSTEMS:     I am not able to complete the review of systems because:    The patient is intubated and sedated   x The patient has altered mental status due to his acute medical problems    The patient has baseline aphasia from prior stroke(s)    The patient has baseline dementia and is not reliable historian    The patient is in acute medical distress and unable to provide information           T    Objective:   VITALS:    Visit Vitals    /67  Pulse 76    Temp 97.7 °F (36.5 °C)    Resp 28    Wt 73 kg (160 lb 15 oz)    SpO2 100%    BMI 28.51 kg/m2       PHYSICAL EXAM:    General:    Alert x1, in mild distress, appears much younger than stated age. HEENT: Atraumatic, anicteric sclerae, pink conjunctivae     No oral ulcers, mucosa moist, throat clear, dentition fair  Neck:  Supple, symmetrical,  thyroid: non tender  Lungs:   Poor bs cassie . No Wheezing or Rhonchi. ++++rales. Chest wall:  No tenderness  No Accessory muscle use. Heart:    Irregularly irregularrhythm,  No  murmur   No edema  Abdomen:   Soft, non-tender. Not distended. Bowel sounds normal  Extremities: No cyanosis. No clubbing,      Skin turgor normal, Capillary refill normal, Radial dial pulse 2+  Skin:     Not pale. Not Jaundiced  No rashes   Psych:    Neurologic: Symmetrical strength, Sensation grossly intact. Alert and oriented X 0  Opens eyes  Easily aroused to verbal and physical stimuli    _______________________________________________________________________  Care Plan discussed with:    Comments   Patient x    Family  x    RN     Care Manager                    Consultant:  eleni hyman   _______________________________________________________________________  Expected  Disposition:   Home with Family    HH/PT/OT/RN    SNF/LTC x   [facility]    ________________________________________________________________________  TOTAL TIME:  79 Minutes    Critical Care Provided     Minutes non procedure based      Comments    x Reviewed previous records   >50% of visit spent in counseling and coordination of care x Discussion with patient and/or family and questions answered       ________________________________________________________________________  Signed: Ceci Hathaway MD    Procedures: see electronic medical records for all procedures/Xrays and details which were not copied into this note but were reviewed prior to creation of Plan.     LAB DATA REVIEWED:    Recent Results (from the past 24 hour(s))   EKG, 12 LEAD, INITIAL    Collection Time: 07/24/17  7:58 AM   Result Value Ref Range    Ventricular Rate 93 BPM    Atrial Rate 94 BPM    QRS Duration 70 ms    Q-T Interval 394 ms    QTC Calculation (Bezet) 489 ms    Calculated R Axis -2 degrees    Calculated T Axis 153 degrees    Diagnosis       Atrial fibrillation  Possible Inferior infarct (cited on or before 21-AUG-2016)  T wave abnormality, consider anterior ischemia  When compared with ECG of 17-JUN-2017 05:00,  No significant change was found     LACTIC ACID    Collection Time: 07/24/17  8:13 AM   Result Value Ref Range    Lactic acid 3.5 (HH) 0.4 - 2.0 MMOL/L   CBC WITH AUTOMATED DIFF    Collection Time: 07/24/17  8:23 AM   Result Value Ref Range    WBC 3.2 (L) 3.6 - 11.0 K/uL    RBC 3.82 3.80 - 5.20 M/uL    HGB 9.8 (L) 11.5 - 16.0 g/dL    HCT 31.6 (L) 35.0 - 47.0 %    MCV 82.7 80.0 - 99.0 FL    MCH 25.7 (L) 26.0 - 34.0 PG    MCHC 31.0 30.0 - 36.5 g/dL    RDW 15.0 (H) 11.5 - 14.5 %    PLATELET 617 580 - 537 K/uL    NEUTROPHILS 42 32 - 75 %    LYMPHOCYTES 51 (H) 12 - 49 %    MONOCYTES 3 (L) 5 - 13 %    EOSINOPHILS 3 0 - 7 %    BASOPHILS 1 0 - 1 %    ABS. NEUTROPHILS 1.3 (L) 1.8 - 8.0 K/UL    ABS. LYMPHOCYTES 1.6 0.8 - 3.5 K/UL    ABS. MONOCYTES 0.1 0.0 - 1.0 K/UL    ABS. EOSINOPHILS 0.1 0.0 - 0.4 K/UL    ABS.  BASOPHILS 0.0 0.0 - 0.1 K/UL   CK W/ CKMB & INDEX    Collection Time: 07/24/17  8:23 AM   Result Value Ref Range    CK 59 26 - 192 U/L    CK - MB <1.0 <3.6 NG/ML    CK-MB Index Cannot be calulated 0 - 2.5     TROPONIN I    Collection Time: 07/24/17  8:23 AM   Result Value Ref Range    Troponin-I, Qt. 1.64 (H) <0.05 ng/mL   NT-PRO BNP    Collection Time: 07/24/17  8:23 AM   Result Value Ref Range    NT pro-BNP >24085 (H) 0 - 171 PG/ML   METABOLIC PANEL, COMPREHENSIVE    Collection Time: 07/24/17  8:23 AM   Result Value Ref Range    Sodium 139 136 - 145 mmol/L    Potassium 4.1 3.5 - 5.1 mmol/L    Chloride 103 97 - 108 mmol/L    CO2 29 21 - 32 mmol/L    Anion gap 7 5 - 15 mmol/L    Glucose 105 (H) 65 - 100 mg/dL    BUN 23 (H) 6 - 20 MG/DL    Creatinine 1.98 (H) 0.55 - 1.02 MG/DL    BUN/Creatinine ratio 12 12 - 20      GFR est AA 28 (L) >60 ml/min/1.73m2    GFR est non-AA 23 (L) >60 ml/min/1.73m2    Calcium 8.0 (L) 8.5 - 10.1 MG/DL    Bilirubin, total 0.9 0.2 - 1.0 MG/DL    ALT (SGPT) 9 (L) 12 - 78 U/L    AST (SGOT) 20 15 - 37 U/L    Alk.  phosphatase 77 45 - 117 U/L    Protein, total 8.6 (H) 6.4 - 8.2 g/dL    Albumin 2.2 (L) 3.5 - 5.0 g/dL    Globulin 6.4 (H) 2.0 - 4.0 g/dL    A-G Ratio 0.3 (L) 1.1 - 2.2

## 2017-07-24 NOTE — ED NOTES
Pt placed on 2L O2 nasal cannula. Pt cleansed of urine and brief changed, gown placed on pt pt placed on monitor x 3.

## 2017-07-24 NOTE — CONSULTS
[id number] [address] [facility]     Date of  Admission: 7/24/2017  7:52 AM     Admission type:Emergency    Consult for:  Consult by: Subjective:     Rosalia Hunt is a 80 y.o. female admitted for CHF. Known EF 20-25%, returns from [address] [facility] with increased dyspnea. CXR shows congestion and pro-BNP 30K. Slight elevation of trop but she denies CP.     Patient Active Problem List    Diagnosis Date Noted    Respiratory failure ([address] 07/24/2017    CHF (congestive heart failure) ([address] 24/67/9006    Systolic CHF, acute on chronic ([address] 07/24/2017    Ischemic cardiomyopathy 06/19/2017    Pulmonary edema 06/17/2017    NSTEMI (non-ST elevated myocardial infarction) ([address] 06/17/2017    CVA, old, alterations of sensations 04/28/2017    Altered mental status 04/28/2017    Diuretic-induced hypokalemia 04/20/2015    Essential hypertension 04/15/2015    Atrial fibrillation ([address] 07/28/2014    Angina, class II ([address] 03/05/2013    Obesity 02/25/2013    Advanced age 02/29/2012    DNR (do not resuscitate) 11/29/2011    Coronary atherosclerosis of native coronary artery 05/07/2011    Carpal tunnel syndrome 03/28/2011    TIA (transient ischemic attack) 01/14/2011    Anxiety reaction 07/29/2010    Right lumbar radiculopathy 05/25/2010    DJD (degenerative joint disease), multiple sites 03/31/2010    Insomnia 03/31/2010    Gout 11/06/2009    High cholesterol 11/06/2009    Kidney disease, chronic, stage III (moderate, EGFR 30-59 ml/min) 11/06/2009      Chaya Lilly MD  Past Medical History:   Diagnosis Date    Angina, class II ([address] 3/5/2013    ARF (acute renal failure) ([address] 12/19/2010    Atrial fibrillation ([address] 7/28/2014    Bleeding per rectum 11/6/2009    Coronary atherosclerosis of native coronary artery 5/7/2011    DJD (degenerative joint disease)     DNR (do not resuscitate) 11/29/2011    Gout 11/6/2009    High cholesterol 11/6/2009    Hypertension     IHD (ischemic heart disease) 11/6/2009    Kidney disease, chronic, stage III (moderate, EGFR 30-59 ml/min) 11/6/2009    Kidney disease, chronic, stage III (moderate, EGFR 30-59 ml/min) 11/6/2009    Obesity     TIA (transient ischemic attack) 1/14/2011      Social History     Social History    Marital status:      Spouse name: N/A    Number of children: N/A    Years of education: N/A     Social History Main Topics    Smoking status: Never Smoker    Smokeless tobacco: Never Used    Alcohol use No    Drug use: No    Sexual activity: No     Other Topics Concern    Not on file     Social History Narrative     No Known Allergies   Family History   Problem Relation Age of Onset    Stroke Other     Hypertension Other     Heart Disease Other       Current Facility-Administered Medications   Medication Dose Route Frequency    ondansetron (ZOFRAN) injection 4 mg  4 mg IntraVENous Q6H PRN    acetaminophen (TYLENOL) tablet 650 mg  650 mg Oral Q4H PRN    sodium chloride (NS) flush 5-10 mL  5-10 mL IntraVENous Q8H    sodium chloride (NS) flush 5-10 mL  5-10 mL IntraVENous PRN    heparin (porcine) injection 5,000 Units  5,000 Units SubCUTAneous Q12H    aspirin delayed-release tablet 81 mg  81 mg Oral DAILY    carvedilol (COREG) tablet 12.5 mg  12.5 mg Oral BID WITH MEALS    clopidogrel (PLAVIX) tablet 75 mg  75 mg Oral DAILY    potassium chloride SR (KLOR-CON 10) tablet 10 mEq  10 mEq Oral DAILY    pravastatin (PRAVACHOL) tablet 20 mg  20 mg Oral QHS    nitroglycerin (NITROBID) 2 % ointment 1 Inch  1 Inch Topical BID    furosemide (LASIX) injection 40 mg  40 mg IntraVENous Q12H        Review of Symptoms:   Constitutional: negative  Eyes: negative   Ears, nose, mouth, throat, and face: negative  Respiratory: negative   Cardiovascular: negative   Gastrointestinal: negative  Genitourinary:negative   Musculoskeletal:negative Neurological: negative   Endocrine: negative          Objective:      Visit Vitals    /88    Pulse 79    Temp 98 °F (36.7 °C)    Resp 18    Wt 160 lb 15 oz (73 kg)    SpO2 99%    BMI 28.51 kg/m2       Physical:   General:   Heart: RRR, no m/S3/JVD, no carotid bruits   Lungs: scattered rales  Abdomen: Soft, +BS, NTND   Extremities: LE cassie +DP/PT, no edema   Neurologic: Grossly normal    Data Review:   Recent Labs      07/24/17   0823   WBC  3.2*   HGB  9.8*   HCT  31.6*   PLT  231     Recent Labs      07/24/17   0823   NA  139   K  4.1   CL  103   CO2  29   GLU  105*   BUN  23*   CREA  1.98*   CA  8.0*   ALB  2.2*   TBILI  0.9   SGOT  20   ALT  9*       Recent Labs      07/24/17   0823   TROIQ  1.64*   CPK  59   CKMB  <1.0       No intake or output data in the 24 hours ending 07/24/17 1450     Cardiographics    Telemetry:   ECG: Af controlled rate  Echocardiogram:   CXRAY: CHF       Assessment:       Principal Problem:    Systolic CHF, acute on chronic (HCC) (7/24/2017)    Active Problems:    Respiratory failure (HCC) (7/24/2017)      CHF (congestive heart failure) ([facility] (7/24/2017)         Plan:     Returns with exacerbation of CHF with elevated pro-BNP and vasc congestion by CXR. Improved post diuresis. Agree with palliative care and conservative management.   Dr Jon Vora will follow with you    Baron Aashish MD

## 2017-07-24 NOTE — ED NOTES
Pt presents via ems from [facility] after being found to have O2 sats in the 70's on room air. Pt was given 1 duo neb per ems and placed on BIPAP. Pt is alert but drowsy, opens eyes to verbal stimuli.

## 2017-07-24 NOTE — ED NOTES
Pt refusing to have blood drawn at this time, will attempt again when granddaughter returns to room.

## 2017-07-24 NOTE — PROGRESS NOTES
Pharmacy Clarification of the Prior to Admission Medication Regimen Retrospective to the Admission Medication Reconciliation    The patient was not interviewed regarding clarification of the prior to admission medication regimen. Patient was transferred from [facility] to [address] [place] [address]. Pharmacy called [facility], [phone], and spoke with MARY Lerner, who was able to verify PTA medications regimens for the patient. Information Obtained From: [facility] transfer papers    Recommendations/Findings: The following amendments were made to the patient's active medication list on file at [address] [place] [address]:     1) Additions:    bisacodyl (DULCOLAX, BISACODYL,) 10 mg suppository   mineral oil (FLEET) enema   magnesium hydroxide (SANDOVAL MILK OF MAGNESIA) 400 mg/5 mL suspension    2) Removals: NONE    3) Changes: NONE    4) Pertinent Pharmacy Findings:   Updated patients preferred outpatient pharmacy to: Pharmacy did not update the patient's outpatient pharmacy, due to patient living at a [facility]. PTA medication list was corrected to the following:     Prior to Admission Medications   Prescriptions Last Dose Informant Patient Reported? Taking?   acetaminophen (TYLENOL) 325 mg tablet Unknown at Unknown time Transfer Papers No No   Sig: Take 2 Tabs by mouth every four (4) hours as needed. aspirin delayed-release 81 mg tablet 7/23/2017 at 0947 Transfer Papers Yes Yes   Sig: Take 81 mg by mouth daily. bisacodyl (DULCOLAX, BISACODYL,) 10 mg suppository Unknown at Unknown time Transfer Papers Yes No   Sig: Insert 10 mg into rectum as needed (constipation). If not BM from MOM/lax day 4   carvedilol (COREG) 12.5 mg tablet 7/23/2017 at 1753 Transfer Papers No Yes   Sig: TAKE 1 AND 1/2 TABLETS BY MOUTH TWICE DAILY WITH MEALS   cholecalciferol (VITAMIN D3) 1,000 unit tablet 7/23/2017 at 0947 Transfer Papers Yes Yes   Sig: Take 1,000 Units by mouth daily.    clopidogrel (PLAVIX) 75 mg tablet 7/23/2017 at 0947 Transfer Papers No Yes   Sig: TAKE 1 TABLET BY MOUTH DAILY. colchicine 0.6 mg tablet Unknown at Unknown time Transfer Papers Yes No   Sig: Take 0.6 mg by mouth daily as needed (gout). furosemide (LASIX) 40 mg tablet 7/23/2017 at 0947 Transfer Papers No Yes   Sig: Take 1 Tab by mouth daily. isosorbide mononitrate ER (IMDUR) 30 mg tablet 7/23/2017 at 0947 Transfer Papers Yes Yes   Sig: Take 30 mg by mouth daily. loperamide (IMODIUM) 2 mg capsule Unknown at Unknown time Transfer Papers Yes No   Sig: Take 2 mg by mouth every six (6) hours as needed for Diarrhea.   magnesium chloride (MAG DELAY) 64 mg tablet 7/23/2017 at 0947 Transfer Papers Yes Yes   Sig: Take 64 mg by mouth daily. magnesium hydroxide (SANDOVAL MILK OF MAGNESIA) 400 mg/5 mL suspension Unknown at Unknown time Transfer Papers Yes No   Sig: Take 30 mL by mouth daily as needed for Constipation. If no BM day 3   mineral oil (FLEET) enema Unknown at Unknown time Transfer Papers Yes No   Sig: Insert  into rectum as needed for Constipation. If not BM from bisacodyl supp day 5   potassium chloride SR (KLOR-CON 10) 10 mEq tablet 7/23/2017 at 0947 Transfer Papers No Yes   Sig: Take 1 Tab by mouth daily. pravastatin (PRAVACHOL) 20 mg tablet 7/23/2017 at 2053 Transfer Papers Yes Yes   Sig: Take 20 mg by mouth nightly. Indications: hypercholesterolemia   traMADol (ULTRAM) 50 mg tablet 7/21/2017 at 0757 Transfer Papers No Yes   Sig: Take 1 Tab by mouth every eight (8) hours as needed for Pain. Max Daily Amount: 150 mg.       Facility-Administered Medications: None          Thank you,  Kathleen Mtz, [facility]  Medication History Pharmacy Technician

## 2017-07-24 NOTE — IP AVS SNAPSHOT
[address] [facility] 
[phone] Patient: Susan Gongora MRN: [id number] :8/15/1911 Current Discharge Medication List  
  
CONTINUE these medications which have CHANGED Dose & Instructions Dispensing Information Comments Morning Noon Evening Bedtime  
 furosemide 40 mg tablet Commonly known as:  LASIX What changed:  when to take this Your last dose was: Your next dose is:    
   
   
 Dose:  40 mg Take 1 Tab by mouth two (2) times a day. Quantity:  30 Tab Refills:  1 CONTINUE these medications which have NOT CHANGED Dose & Instructions Dispensing Information Comments Morning Noon Evening Bedtime  
 acetaminophen 325 mg tablet Commonly known as:  TYLENOL Your last dose was: Your next dose is:    
   
   
 Dose:  650 mg Take 2 Tabs by mouth every four (4) hours as needed. Quantity:  40 Tab Refills:  0  
     
   
   
   
  
 aspirin delayed-release 81 mg tablet Your last dose was: Your next dose is:    
   
   
 Dose:  81 mg Take 81 mg by mouth daily. Refills:  0  
     
   
   
   
  
 carvedilol 12.5 mg tablet Commonly known as:  James Mood Your last dose was: Your next dose is: TAKE 1 AND 1/2 TABLETS BY MOUTH TWICE DAILY WITH MEALS Quantity:  90 Tab Refills:  5  
     
   
   
   
  
 clopidogrel 75 mg Tab Commonly known as:  PLAVIX Your last dose was: Your next dose is: TAKE 1 TABLET BY MOUTH DAILY. Quantity:  30 Tab Refills:  11  
     
   
   
   
  
 colchicine 0.6 mg tablet Your last dose was: Your next dose is:    
   
   
 Dose:  0.6 mg Take 0.6 mg by mouth daily as needed (gout). Refills:  0 DULCOLAX (BISACODYL) 10 mg suppository Generic drug:  bisacodyl Your last dose was: Your next dose is: Dose:  10 mg Insert 10 mg into rectum as needed (constipation). If not BM from MOM/lax day 4 Refills:  0  
     
   
   
   
  
 isosorbide mononitrate ER 30 mg tablet Commonly known as:  IMDUR Your last dose was: Your next dose is:    
   
   
 Dose:  30 mg Take 30 mg by mouth daily. Refills:  0  
     
   
   
   
  
 loperamide 2 mg capsule Commonly known as:  IMODIUM Your last dose was: Your next dose is:    
   
   
 Dose:  2 mg Take 2 mg by mouth every six (6) hours as needed for Diarrhea. Refills:  0  
     
   
   
   
  
 magnesium chloride 64 mg tablet Commonly known as:  MAG DELAY Your last dose was: Your next dose is:    
   
   
 Dose:  64 mg Take 64 mg by mouth daily. Refills:  0  
     
   
   
   
  
 mineral oil enema Commonly known as:  FLEET Your last dose was: Your next dose is: Insert  into rectum as needed for Constipation. If not BM from bisacodyl supp day 5 Refills:  0 SANDOVAL MILK OF MAGNESIA 400 mg/5 mL suspension Generic drug:  magnesium hydroxide Your last dose was: Your next dose is:    
   
   
 Dose:  30 mL Take 30 mL by mouth daily as needed for Constipation. If no BM day 3 Refills:  0  
     
   
   
   
  
 potassium chloride SR 10 mEq tablet Commonly known as:  KLOR-CON 10 Your last dose was: Your next dose is:    
   
   
 Dose:  10 mEq Take 1 Tab by mouth daily. Quantity:  30 Tab Refills:  1  
     
   
   
   
  
 pravastatin 20 mg tablet Commonly known as:  PRAVACHOL Your last dose was: Your next dose is:    
   
   
 Dose:  20 mg Take 20 mg by mouth nightly. Indications: hypercholesterolemia Refills:  0  
     
   
   
   
  
 traMADol 50 mg tablet Commonly known as:  ULTRAM  
   
Your last dose was:     
   
Your next dose is:    
   
   
 Dose:  50 mg  
 Take 1 Tab by mouth every eight (8) hours as needed for Pain. Max Daily Amount: 150 mg.  
 Quantity:  40 Tab Refills:  0  
     
   
   
   
  
 VITAMIN D3 1,000 unit tablet Generic drug:  cholecalciferol Your last dose was: Your next dose is:    
   
   
 Dose:  1000 Units Take 1,000 Units by mouth daily. Refills:  0 Where to Get Your Medications These medications were sent to [facility], [address] [address]  [address], [address] [place] [address] Phone:  [phone]  
  furosemide 40 mg tablet

## 2017-07-24 NOTE — CONSULTS
Palliative Medicine Consult  Rodrigo: [phone] ([phone])    Patient Name: Zita Staley  YOB: 1911    Date of Initial Consult: 7/24/2017  Reason for Consult: End Stage Disease  Requesting Provider: Dr. Reid Franz  Primary Care Physician: Danyell Dodd MD      SUMMARY:   Zita Staley is a 80 y. o. with a past history of NSTEMI, HTN, HLD, angina, CKD, TIAs, s/p CVA, afib., gout, angina, osteoarthrtis, who was admitted on 7/24/2017 from [facility] with a diagnosis of SOB, hypoxia- pulse oxing 70% on RA, and  weakness. She does not use oxygen at [facility]. EMS was called at [facility], placed on oxygen with little results then started on bipap. Currently just on nasal canula. Pt was very functional up until 03/2017 when she had a stroke since then she has been living at [facility]. She has functional significant declining since her stroke. She is mostly WC bound at this time. [facility] Pt was c/o chest pain the day before yesterday which then resolvedCurrent medical issues leading to Palliative Medicine involvement include: advanced age, SOB, debility. Recent Hospitalizations:  4/28/2017-5/1/2017- AMS  6/17/2017-6/21/2017- NSTEMI     PALLIATIVE DIAGNOSES:   1. SOB  2. Hypoxia  3. Respiratory distress  4. Debility  5. Advanced Age       PLAN:   1. Met with patient and granddaughter, Desiree Line- [phone], [phone]. Patient was able to follow commands and answer yes and no to questions but did not talk much more. She was very fatigued. Granddaughter tells me that she was up having lunch yesterday with her but today more lethargic. Granddaughter tells us her memory is slipping for the last 3 months or so. She can remember what happened 75 years ago but can't remember what she had for lunch yeserday. She is currently not oriented. 2. Aidan Payne NP and I introduced palliative medicine to granddaughter.  Patient has DDNR and AMD. We discussed how hospice may be helpful to them. Сергей Grace tells us she has had a bad experience with hospice and every time a loved one enters hospice they die within a few days. She tells she does not think her grandmother is close to death! I explained that is not what hospice is all about. I explained the hospice philosophy and how they can be of assistance for many moths and that is not the intention of hospice. It is to offer an extra set of eyes and ears to focus on her comfort. She verbalized understanding but still was not sure. She told us she would think about it and get back to us. I gave her my business card. She told us she would contact us if she wanted hospice. 3. Initial consult note routed to primary continuity provider  4. Communicated plan of care with: Palliative IDT       GOALS OF CARE / TREATMENT PREFERENCES:   [====Goals of Care====]  GOALS OF CARE:  Patient / health care proxy stated goals: To get back to [facility]       TREATMENT PREFERENCES:   Code Status: DNR    Advance Care Planning:  Advance Care Planning 7/24/2017   Patient's Healthcare Decision Maker is: Named in scanned ACP document   Primary Decision Maker Name Barbie Blood   Primary Decision Maker Phone Number [phone]   Primary Decision Maker Relationship to Patient Grandparent   Confirm Advance Directive Yes, on file   Does the patient have other document types Do Not Resuscitate       Other:    The palliative care team has discussed with patient / health care proxy about goals of care / treatment preferences for patient.  [====Goals of Care====]         HISTORY:     History obtained from: chart    CHIEF COMPLAINT: SOB but it is better now    HPI/SUBJECTIVE:    The patient is:   [x] Verbal and participatory but limited. Able to follow commands. [] Non-participatory due to:   Ms. Ruddy Kaur is a 8 year old female who was admitted for respiratory distress doing better now. Was on bipap.  Now on nasal sharita. Clinical Pain Assessment (nonverbal scale for severity on nonverbal patients):   [++++ Clinical Pain Assessment++++]  [++++Pain Severity++++]: Pain: 0denies  [++++Pain Character++++]:   [++++Pain Duration++++]:   [++++Pain Effect++++]:   [++++Pain Factors++++]:   [++++Pain Frequency++++]:   [++++Pain Location++++]:   [++++ Clinical Pain Assessment++++]     FUNCTIONAL ASSESSMENT:     Palliative Performance Scale (PPS):  PPS: 30       PSYCHOSOCIAL/SPIRITUAL SCREENING:     Advance Care Planning:  Advance Care Planning 7/24/2017   Patient's Healthcare Decision Maker is: Named in scanned ACP document   Primary Decision Maker Name Pepper Bright   Primary Decision Maker Phone Number [phone]   Primary Decision Maker Relationship to Patient Grandparent   Confirm Advance Directive Yes, on file   Does the patient have other document types Do Not Resuscitate        Any spiritual / Religion concerns:  [] Yes /  [x] No    Caregiver Burnout:  [] Yes /  [x] No /  [] No Caregiver Present      Anticipatory grief assessment:   [x] Normal  / [] Maladaptive       ESAS Anxiety: Anxiety: 0    ESAS Depression: Depression: 0        REVIEW OF SYSTEMS:     Positive and pertinent negative findings in ROS are noted above in HPI. The following systems were [x] reviewed / [] unable to be reviewed as noted in HPI  Other findings are noted below. Systems: constitutional, ears/nose/mouth/throat, respiratory, gastrointestinal, genitourinary, musculoskeletal, integumentary, neurologic, psychiatric, endocrine. Positive findings noted below.   Modified ESAS Completed by: provider   Fatigue: 3 Drowsiness: 4   Depression: 0 Pain: 0   Anxiety: 0 Nausea: 0   Anorexia: 2 Dyspnea: 1     Constipation: No              PHYSICAL EXAM:     From RN flowsheet:  Wt Readings from Last 3 Encounters:   07/24/17 160 lb 15 oz (73 kg)   06/21/17 141 lb 11.2 oz (64.3 kg)   05/01/17 157 lb 3 oz (71.3 kg)     Blood pressure 154/88, pulse 79, temperature 98 °F (36.7 °C), resp. rate 18, weight 160 lb 15 oz (73 kg), SpO2 99 %.     Pain Scale 1: Numeric (0 - 10)  Pain Intensity 1: 0                 Last bowel movement, if known:     Constitutional: Elderly female lying in bed asleep, in NAD  Eyes: pupils equal, anicteric  ENMT: no nasal discharge, dry mucous membranes  Cardiovascular: irregular rhythm, distal pulses intact, bradycardic  Respiratory: breathing not labored, symmetric, on nasal canula  Gastrointestinal: soft non-tender, +bowel sounds  Musculoskeletal: no deformity, no tenderness to palpation  Skin: warm, dry  Neurologic: following commands, moving all extremities slowly  Psychiatric: full affect, no hallucinations, slow to respond  Other:       HISTORY:     Principal Problem:    Systolic CHF, acute on chronic (HCC) (7/24/2017)    Active Problems:    Respiratory failure ([address] (7/24/2017)      CHF (congestive heart failure) ([address] (7/24/2017)      Past Medical History:   Diagnosis Date    Angina, class II ([address] 3/5/2013    ARF (acute renal failure) ([address] 12/19/2010    Atrial fibrillation ([address] 7/28/2014    Bleeding per rectum 11/6/2009    Coronary atherosclerosis of native coronary artery 5/7/2011    DJD (degenerative joint disease)     DNR (do not resuscitate) 11/29/2011    Gout 11/6/2009    High cholesterol 11/6/2009    Hypertension     IHD (ischemic heart disease) 11/6/2009    Kidney disease, chronic, stage III (moderate, EGFR 30-59 ml/min) 11/6/2009    Kidney disease, chronic, stage III (moderate, EGFR 30-59 ml/min) 11/6/2009    Obesity     TIA (transient ischemic attack) 1/14/2011      Past Surgical History:   Procedure Laterality Date    COLONOSCOPY  4/11/2005     Dr. Mendoza  HX CHOLECYSTECTOMY  11/94    [facility] [address]    [facility]      Family History   Problem Relation Age of Onset    Stroke Other     Hypertension Other     Heart Disease Other       History reviewed, no pertinent family history. Social History   Substance Use Topics    Smoking status: Never Smoker    Smokeless tobacco: Never Used    Alcohol use No     No Known Allergies   Current Facility-Administered Medications   Medication Dose Route Frequency    ondansetron (ZOFRAN) injection 4 mg  4 mg IntraVENous Q6H PRN    acetaminophen (TYLENOL) tablet 650 mg  650 mg Oral Q4H PRN    sodium chloride (NS) flush 5-10 mL  5-10 mL IntraVENous Q8H    sodium chloride (NS) flush 5-10 mL  5-10 mL IntraVENous PRN    heparin (porcine) injection 5,000 Units  5,000 Units SubCUTAneous Q12H    aspirin delayed-release tablet 81 mg  81 mg Oral DAILY    carvedilol (COREG) tablet 12.5 mg  12.5 mg Oral BID WITH MEALS    clopidogrel (PLAVIX) tablet 75 mg  75 mg Oral DAILY    potassium chloride SR (KLOR-CON 10) tablet 10 mEq  10 mEq Oral DAILY    pravastatin (PRAVACHOL) tablet 20 mg  20 mg Oral QHS    nitroglycerin (NITROBID) 2 % ointment 1 Inch  1 Inch Topical BID    furosemide (LASIX) injection 40 mg  40 mg IntraVENous Q12H          LAB AND IMAGING FINDINGS:     Lab Results   Component Value Date/Time    WBC 3.2 07/24/2017 08:23 AM    HGB 9.8 07/24/2017 08:23 AM    PLATELET 648 21/24/4392 08:23 AM     Lab Results   Component Value Date/Time    Sodium 139 07/24/2017 08:23 AM    Potassium 4.1 07/24/2017 08:23 AM    Chloride 103 07/24/2017 08:23 AM    CO2 29 07/24/2017 08:23 AM    BUN 23 07/24/2017 08:23 AM    Creatinine 1.98 07/24/2017 08:23 AM    Calcium 8.0 07/24/2017 08:23 AM    Magnesium 2.0 06/21/2017 02:42 AM      Lab Results   Component Value Date/Time    AST (SGOT) 20 07/24/2017 08:23 AM    Alk.  phosphatase 77 07/24/2017 08:23 AM    Protein, total 8.6 07/24/2017 08:23 AM    Albumin 2.2 07/24/2017 08:23 AM    Globulin 6.4 07/24/2017 08:23 AM     Lab Results   Component Value Date/Time    INR 1.2 04/28/2017 10:43 AM    Prothrombin time 12.3 04/28/2017 10:43 AM    aPTT 25.6 04/28/2017 10:43 AM      Lab Results   Component Value Date/Time    Iron 53 03/21/2012 10:50 AM    TIBC 219 03/21/2012 10:50 AM    Iron % saturation 24 03/21/2012 10:50 AM      Lab Results   Component Value Date/Time    pH 7.39 06/17/2017 03:53 AM    PCO2 32 06/17/2017 03:53 AM    PO2 399 06/17/2017 03:53 AM     No components found for: [place]   Lab Results   Component Value Date/Time    CK 59 07/24/2017 08:23 AM    CK - MB <1.0 07/24/2017 08:23 AM                Total time: 70 minutes  Counseling / coordination time, spent as noted above: 50 minutes  > 50% counseling / coordination?: yes    Prolonged service was provided for  []30 min   []75 min in face to face time in the presence of the patient, spent as noted above. Time Start:   Time End:   Note: this can only be billed with 48184 (initial) or 33708 (follow up). If multiple start / stop times, list each separately.

## 2017-07-24 NOTE — IP AVS SNAPSHOT
[address] [facility] [address] [phone] Patient: Daija Garcia MRN: [id number] :8/15/1911 You are allergic to the following No active allergies Recent Documentation Weight BMI OB Status Smoking Status 64.9 kg 25.35 kg/m2 Postmenopausal Never Smoker Emergency Contacts Name Discharge Info Relation Home Work Mobile Aylin Nichols DISCHARGE CAREGIVER [3] Other Relative [6] [phone] About your hospitalization You were admitted on:  2017 You last received care in the:  [facility] CARDIOPULMONARY CARE You were discharged on:  2017 Unit phone number:  [phone] Why you were hospitalized Your primary diagnosis was:  Systolic Chf, Acute On Chronic (Hcc) Your diagnoses also included:  Respiratory Failure (Hcc), Chf (Congestive Heart Failure) (Hcc) Providers Seen During Your Hospitalizations Provider Role Specialty Primary office phone Jesús Wilson DO Attending Provider Emergency Medicine [phone] Teena Rudolph MD Attending Provider Hospitalist [phone] Rut Rachel MD Attending Provider Internal Medicine [phone] Your Primary Care Physician (PCP) Primary Care Physician Office Phone Office Fax [place] [phone] Follow-up Information Follow up With Details Comments Contact Info Jewell Ornelas ([facility])   [address] [facility] [address] [phone] Braxton Ernandez MD   [address] [facility] [address] [phone] Your Appointments 2017 To Be Determined Sn Hospice Admission Eval with MARY [address] ([address]) Adam [address] ([address]) Friday July 28, 2017 To Be Determined SN HOSPICE ASSESSMENT with Monika Umana, MARY Sorto [address] ([address]) Adam [address] ([address]) Current Discharge Medication List  
  
CONTINUE these medications which have CHANGED Dose & Instructions Dispensing Information Comments Morning Noon Evening Bedtime  
 furosemide 40 mg tablet Commonly known as:  LASIX What changed:  when to take this Your last dose was: Your next dose is:    
   
   
 Dose:  40 mg Take 1 Tab by mouth two (2) times a day. Quantity:  30 Tab Refills:  1 CONTINUE these medications which have NOT CHANGED Dose & Instructions Dispensing Information Comments Morning Noon Evening Bedtime  
 acetaminophen 325 mg tablet Commonly known as:  TYLENOL Your last dose was: Your next dose is:    
   
   
 Dose:  650 mg Take 2 Tabs by mouth every four (4) hours as needed. Quantity:  40 Tab Refills:  0  
     
   
   
   
  
 aspirin delayed-release 81 mg tablet Your last dose was: Your next dose is:    
   
   
 Dose:  81 mg Take 81 mg by mouth daily. Refills:  0  
     
   
   
   
  
 carvedilol 12.5 mg tablet Commonly known as:  Willard Franco Your last dose was: Your next dose is: TAKE 1 AND 1/2 TABLETS BY MOUTH TWICE DAILY WITH MEALS Quantity:  90 Tab Refills:  5  
     
   
   
   
  
 clopidogrel 75 mg Tab Commonly known as:  PLAVIX Your last dose was: Your next dose is: TAKE 1 TABLET BY MOUTH DAILY. Quantity:  30 Tab Refills:  11  
     
   
   
   
  
 colchicine 0.6 mg tablet Your last dose was: Your next dose is:    
   
   
 Dose:  0.6 mg Take 0.6 mg by mouth daily as needed (gout). Refills:  0 DULCOLAX (BISACODYL) 10 mg suppository Generic drug:  bisacodyl Your last dose was: Your next dose is:    
   
   
 Dose:  10 mg Insert 10 mg into rectum as needed (constipation). If not BM from MOM/lax day 4 Refills:  0  
     
   
   
   
  
 isosorbide mononitrate ER 30 mg tablet Commonly known as:  IMDUR Your last dose was: Your next dose is:    
   
   
 Dose:  30 mg Take 30 mg by mouth daily. Refills:  0  
     
   
   
   
  
 loperamide 2 mg capsule Commonly known as:  IMODIUM Your last dose was: Your next dose is:    
   
   
 Dose:  2 mg Take 2 mg by mouth every six (6) hours as needed for Diarrhea. Refills:  0  
     
   
   
   
  
 magnesium chloride 64 mg tablet Commonly known as:  MAG DELAY Your last dose was: Your next dose is:    
   
   
 Dose:  64 mg Take 64 mg by mouth daily. Refills:  0  
     
   
   
   
  
 mineral oil enema Commonly known as:  FLEET Your last dose was: Your next dose is: Insert  into rectum as needed for Constipation. If not BM from bisacodyl supp day 5 Refills:  0 SANDOVAL MILK OF MAGNESIA 400 mg/5 mL suspension Generic drug:  magnesium hydroxide Your last dose was: Your next dose is:    
   
   
 Dose:  30 mL Take 30 mL by mouth daily as needed for Constipation. If no BM day 3 Refills:  0  
     
   
   
   
  
 potassium chloride SR 10 mEq tablet Commonly known as:  KLOR-CON 10 Your last dose was: Your next dose is:    
   
   
 Dose:  10 mEq Take 1 Tab by mouth daily. Quantity:  30 Tab Refills:  1  
     
   
   
   
  
 pravastatin 20 mg tablet Commonly known as:  PRAVACHOL Your last dose was: Your next dose is:    
   
   
 Dose:  20 mg Take 20 mg by mouth nightly. Indications: hypercholesterolemia Refills:  0  
     
   
   
   
  
 traMADol 50 mg tablet Commonly known as:  ULTRAM  
   
Your last dose was: Your next dose is:    
   
   
 Dose:  50 mg Take 1 Tab by mouth every eight (8) hours as needed for Pain. Max Daily Amount: 150 mg.  
 Quantity:  40 Tab Refills:  0  
     
   
   
   
  
 VITAMIN D3 1,000 unit tablet Generic drug:  cholecalciferol Your last dose was: Your next dose is:    
   
   
 Dose:  1000 Units Take 1,000 Units by mouth daily. Refills:  0 Where to Get Your Medications [facility] [address] [address]  [address], [address] [place] [address] Phone:  [phone]  
  furosemide 40 mg tablet Discharge Instructions None Discharge Instructions Attachments/References HEART FAILURE ZONES: GENERAL INFO (ENGLISH) HEART FAILURE: AVOIDING TRIGGERS (ENGLISH) Discharge Orders None Introducing [facility]! Iram Cabral introduces Vaprema patient portal. Now you can access parts of your medical record, email your doctor's office, and request medication refills online. 1. In your internet browser, go to [email or web address] 2. Click on the First Time User? Click Here link in the Sign In box. You will see the New Member Sign Up page. 3. Enter your Vaprema Access Code exactly as it appears below. You will not need to use this code after youve completed the sign-up process. If you do not sign up before the expiration date, you must request a new code. · Vaprema Access Code: [email or web address] Expires: 10/25/2017  6:27 PM 
 
4. Enter the last four digits of your Social Security Number (xxxx) and Date of Birth (mm/dd/yyyy) as indicated and click Submit. You will be taken to the next sign-up page. 5. Create a Vaprema ID. This will be your Vaprema login ID and cannot be changed, so think of one that is secure and easy to remember. 6. Create a Diana password. You can change your password at any time. 7. Enter your Password Reset Question and Answer. This can be used at a later time if you forget your password. 8. Enter your e-mail address. You will receive e-mail notification when new information is available in [address] 9. Click Sign Up. You can now view and download portions of your medical record. 10. Click the Download Summary menu link to download a portable copy of your medical information. If you have questions, please visit the Frequently Asked Questions section of the Diana website. Remember, Diana is NOT to be used for urgent needs. For medical emergencies, dial 911. Now available from your iPhone and Android! General Information Please provide this summary of care documentation to your next provider. Patient Signature:  ____________________________________________________________ Date:  ____________________________________________________________  
  
[facility] Provider Signature:  ____________________________________________________________ Date:  ____________________________________________________________ More Information Learning About Heart Failure Zones What are heart failure zones? Heart failure zones give you an easy way to see changes in your heart failure symptoms. They also tell you when you need to get help. Check every day to see which zone you are in. Green zone. You are doing well. This is where you want to be. · Your weight is stable. This means it is not going up or down. · You breathe easily. · You are sleeping well. You are able to lie flat without shortness of breath. · You can do your usual activities. Yellow zone. Be careful. Your symptoms are changing. Call your doctor. · You have new or increased shortness of breath. · You are dizzy or lightheaded, or you feel like you may faint. · You have sudden weight gain, such as more than 2 to 3 pounds in a day or 5 pounds in a week. (Your doctor may suggest a different range of weight gain.) · You have increased swelling in your legs, ankles, or feet. · You are so tired or weak that you cannot do your usual activities. · You are not sleeping well. Shortness of breath wakes you up at night. You need extra pillows. Your doctor's name: ____________________________________________________________ Your doctor's contact information: _________________________________________________ Red zone. This is an emergency. Call 911. You have symptoms of sudden heart failure, such as: 
· You have severe trouble breathing. · You cough up pink, foamy mucus. · You have a new irregular or fast heartbeat. You have symptoms of a heart attack. These may include: · Chest pain or pressure, or a strange feeling in the chest. 
· Sweating. · Shortness of breath. · Nausea or vomiting. · Pain, pressure, or a strange feeling in the back, neck, jaw, or upper belly or in one or both shoulders or arms. · Lightheadedness or sudden weakness. · A fast or irregular heartbeat. If you have symptoms of a heart attack: After you call 911, the  may tell you to chew 1 adult-strength or 2 to 4 low-dose aspirin. Wait for an ambulance. Do not try to drive yourself. Follow-up care is a key part of your treatment and safety. Be sure to make and go to all appointments, and call your doctor if you are having problems. It's also a good idea to know your test results and keep a list of the medicines you take. Where can you learn more? Go to http://liss-ruel.info/. Enter T174 in the search box to learn more about \"Learning About Heart Failure Zones. \" Current as of: February 23, 2017 Content Version: 11.3 © 0494-8790 [employer].  Care instructions adapted under license by [employer] (which disclaims liability or warranty for this information). If you have questions about a medical condition or this instruction, always ask your healthcare professional. [address] any warranty or liability for your use of this information. Avoiding Triggers With Heart Failure: Care Instructions Your Care Instructions Triggers are anything that make your heart failure flare up. A flare-up is also called \"sudden heart failure\" or \"acute heart failure. \" When you have a flare-up, fluid builds up in your lungs, and you have problems breathing. You might need to go to the hospital. By watching for changes in your condition and avoiding triggers, you can prevent heart failure flare-ups. Follow-up care is a key part of your treatment and safety. Be sure to make and go to all appointments, and call your doctor if you are having problems. It's also a good idea to know your test results and keep a list of the medicines you take. How can you care for yourself at home? Watch for changes in your weight and condition · Weigh yourself without clothing at the same time each day. Record your weight. Call your doctor if you have sudden weight gain, such as more than 2 to 3 pounds in a day or 5 pounds in a week. (Your doctor may suggest a different range of weight gain.) A sudden weight gain may mean that your heart failure is getting worse. · Keep a daily record of your symptoms. Write down any changes in how you feel, such as new shortness of breath, cough, or problems eating. Also record if your ankles are more swollen than usual and if you feel more tired than usual. Note anything that you ate or did that could have triggered these changes. Limit sodium Sodium causes your body to hold on to extra water. This may cause your heart failure symptoms to get worse. People get most of their sodium from processed foods. Fast food and restaurant meals also tend to be very high in sodium. · Your doctor may suggest that you limit sodium to 2,000 milligrams (mg) a day or less. That is less than 1 teaspoon of salt a day, including all the salt you eat in cooking or in packaged foods. · Read food labels on cans and food packages. They tell you how much sodium you get in one serving. Check the serving size. If you eat more than one serving, you are getting more sodium. · Be aware that sodium can come in forms other than salt, including monosodium glutamate (MSG), sodium citrate, and sodium bicarbonate (baking soda). MSG is often added to Asian food. You can sometimes ask for food without MSG or salt. · Slowly reducing salt will help you adjust to the taste. Take the salt shaker off the table. · Flavor your food with garlic, lemon juice, onion, vinegar, herbs, and spices instead of salt. Do not use soy sauce, steak sauce, onion salt, garlic salt, mustard, or ketchup on your food, unless it is labeled \"low-sodium\" or \"low-salt. \" 
· Make your own salad dressings, sauces, and ketchup without adding salt. · Use fresh or frozen ingredients, instead of canned ones, whenever you can. Choose low-sodium canned goods. · Eat less processed food and food from restaurants, including fast food. Exercise as directed Moderate, regular exercise is very good for your heart. It improves your blood flow and helps control your weight. But too much exercise can stress your heart and cause a heart failure flare-up. · Check with your doctor before you start an exercise program. 
· Walking is an easy way to get exercise. Start out slowly. Gradually increase the length and pace of your walk. Swimming, riding a bike, and using a treadmill are also good forms of exercise. · When you exercise, watch for signs that your heart is working too hard. You are pushing yourself too hard if you cannot talk while you are exercising.  If you become short of breath or dizzy or have chest pain, stop, sit down, and rest. 
 · Do not exercise when you do not feel well. Take medicines correctly · Take your medicines exactly as prescribed. Call your doctor if you think you are having a problem with your medicine. · Make a list of all the medicines you take. Include those prescribed to you by other doctors and any over-the-counter medicines, vitamins, or supplements you take. Take this list with you when you go to any doctor. · Take your medicines at the same time every day. It may help you to post a list of all the medicines you take every day and what time of day you take them. · Make taking your medicine as simple as you can. Plan times to take your medicines when you are doing other things, such as eating a meal or getting ready for bed. This will make it easier to remember to take your medicines. · Get organized. Use helpful tools, such as daily or weekly pill containers. When should you call for help? Call 911 if you have symptoms of sudden heart failure such as: 
· You have severe trouble breathing. · You cough up pink, foamy mucus. · You have a new irregular or rapid heartbeat. Call your doctor now or seek immediate medical care if: 
· You have new or increased shortness of breath. · You are dizzy or lightheaded, or you feel like you may faint. · You have sudden weight gain, such as more than 2 to 3 pounds in a day or 5 pounds in a week. (Your doctor may suggest a different range of weight gain.) · You have increased swelling in your legs, ankles, or feet. · You are suddenly so tired or weak that you cannot do your usual activities. Watch closely for changes in your health, and be sure to contact your doctor if you develop new symptoms. Where can you learn more? Go to http://liss-ruel.info/. Enter P466 in the search box to learn more about \"Avoiding Triggers With Heart Failure: Care Instructions. \" Current as of: February 23, 2017 Content Version: 11.3 © 8239-7576 [employer]wise, Incorporated. Care instructions adapted under license by [employer] (which disclaims liability or warranty for this information). If you have questions about a medical condition or this instruction, always ask your healthcare professional. [address] any warranty or liability for your use of this information.

## 2017-07-25 NOTE — PROGRESS NOTES
0700 Bedside and Verbal shift change report received from [facility] (offgoing nurse). Report included the following information SBAR, Kardex, Intake/Output, MAR, Accordion and Recent Results. 1115 Patient resting comfortably, no complaints noted. 1430 Patient's IV no longer working, new PIV placed in right arm.

## 2017-07-25 NOTE — DIABETES MGMT
NURSING : HYPOGLYCEMIC RISK ASSESSMENT    Noted glucose events: 7/25/17  Please monitor BG levels and follow the hypoglycemia protocol for treatment. Any questions please call [facility].     Thank you,  Raysa Rosales, [address] [place] [address], [address]  Office:  [phone]

## 2017-07-25 NOTE — PROGRESS NOTES
Palliative Medicine Consult  [place]: [phone]    Patient Name: Jorge Mulligan  YOB: 1911    Date of Initial Consult: 7/24/2017  Reason for Consult: End Stage Disease  Requesting Provider: Dr. Tomasz Toribio  Primary Care Physician: Rosanna Olsen MD      SUMMARY:   Jorge Mulligan is a 80 y. o. with a past history of NSTEMI, HTN, HLD, angina, CKD, TIAs, s/p CVA, afib., gout, angina, osteoarthrtis, who was admitted on 7/24/2017 from [facility] with a diagnosis of SOB, hypoxia- pulse oxing 70% on RA, and  weakness. She does not use oxygen at [facility]. EMS was called at [facility] 2266, placed on oxygen with little results then started on bipap. Currently just on nasal canula. Pt was very functional up until 03/2017 when she had a stroke since then she has been living at [facility]. She has functional significant declining since her stroke. She is mostly WC bound at this time. She was supposed to be DC from SNF yesterday to senior living. Pt was c/o chest pain the day before yesterday which then resolvedCurrent medical issues leading to Palliative Medicine involvement include: advanced age, SOB, debility. Recent Hospitalizations:  4/28/2017-5/1/2017- AMS  6/17/2017-6/21/2017- NSTEMI    Interim History:  No acute events overnight, Breathing is better. Reports she is \"fair\", no pain. PALLIATIVE DIAGNOSES:   1. SOB  2. Hypoxia  3. Respiratory distress  4. Debility  5. Advanced Age     PLAN:   1. Met with patient and granddaughter, Mandy Tepseo- [phone]. Granddaughter had long  list of questions regarding hospice services that we discussed in detail. 2. Granddaughter tearful at times but appreciative of information and will speak with her sister (secondary agent) and decide if hospice services are something they want to enroll in for their grandmother.   3. Encouraged her to call hospice providers in the community that she is familiar with and get information. 4. Also offered to set up hospice information session here in the hospital which she declined at this time but will keep it in mind. 5. Encouraged Сергей Grace to have her sister call our office and we would be happy to answer any questions she may have. 6. Will continue to follow for Palliative medicine needs. 7. Initial consult note routed to primary continuity provider  8. Communicated plan of care with: Palliative IDT       GOALS OF CARE / TREATMENT PREFERENCES:   [====Goals of Care====]  GOALS OF CARE:  Patient / health care proxy stated goals: To get back to [facility]       TREATMENT PREFERENCES:   Code Status: DNR    Advance Care Planning:  Advance Care Planning 7/24/2017   Patient's Healthcare Decision Maker is: Named in scanned ACP document   Primary Decision Maker Name Barbie Blood   Primary Decision Maker Phone Number [phone]   Primary Decision Maker Relationship to Patient Grandparent   Confirm Advance Directive Yes, on file   Does the patient have other document types Do Not Resuscitate       Other:    The palliative care team has discussed with patient / health care proxy about goals of care / treatment preferences for patient.  [====Goals of Care====]         HISTORY:     History obtained from: chart    CHIEF COMPLAINT: Offered no complaints    HPI/SUBJECTIVE:    The patient is:   [x] Verbal and participatory but limited. Able to follow commands. [] Non-participatory due to:   Ms. Ruddy Kaur is a 8 year old female who was admitted for respiratory distress doing better now. Was on bipap. Now on nasal canula. Resting comfortably in bed. Answering questions appropriately.     Clinical Pain Assessment (nonverbal scale for severity on nonverbal patients):   [++++ Clinical Pain Assessment++++]  [++++Pain Severity++++]: Pain: 0  [++++Pain Character++++]:   [++++Pain Duration++++]:   [++++Pain Effect++++]:   [++++Pain Factors++++]:   [++++Pain Frequency++++]:   [++++Pain Location++++]:   [++++ Clinical Pain Assessment++++]     FUNCTIONAL ASSESSMENT:     Palliative Performance Scale (PPS):  PPS: 30       PSYCHOSOCIAL/SPIRITUAL SCREENING:     Advance Care Planning:  Advance Care Planning 7/24/2017   Patient's Healthcare Decision Maker is: Named in scanned ACP document   Primary Decision Maker Name Fausto Blackwood   Primary Decision Maker Phone Number [phone]   Primary Decision Maker Relationship to Patient Grandparent   Confirm Advance Directive Yes, on file   Does the patient have other document types Do Not Resuscitate        Any spiritual / Adventism concerns:  [] Yes /  [x] No    Caregiver Burnout:  [] Yes /  [x] No /  [] No Caregiver Present      Anticipatory grief assessment:   [x] Normal  / [] Maladaptive       ESAS Anxiety: Anxiety: 0    ESAS Depression: Depression: 0        REVIEW OF SYSTEMS:     Positive and pertinent negative findings in ROS are noted above in HPI. The following systems were [x] reviewed / [] unable to be reviewed as noted in HPI  Other findings are noted below. Systems: constitutional, ears/nose/mouth/throat, respiratory, gastrointestinal, genitourinary, musculoskeletal, integumentary, neurologic, psychiatric, endocrine. Positive findings noted below. Modified ESAS Completed by: provider   Fatigue: 4 Drowsiness: 2   Depression: 0 Pain: 0   Anxiety: 0 Nausea: 0   Anorexia: 0 Dyspnea: 0     Constipation: No     Stool Occurrence(s): 1        PHYSICAL EXAM:     From RN flowsheet:  Wt Readings from Last 3 Encounters:   07/26/17 151 lb 7.3 oz (68.7 kg)   06/21/17 141 lb 11.2 oz (64.3 kg)   05/01/17 157 lb 3 oz (71.3 kg)     Blood pressure 140/68, pulse 63, temperature 98.1 °F (36.7 °C), resp. rate 16, weight 151 lb 7.3 oz (68.7 kg), SpO2 99 %.     Pain Scale 1: Numeric (0 - 10)  Pain Intensity 1: 0                 Last bowel movement, if known:     Constitutional: Elderly female lying in bed asleep, in NAD  Eyes: pupils equal, anicteric  ENMT: no nasal discharge, dry mucous membranes  Cardiovascular: irregular rhythm, distal pulses intact, distant heart sounds  Respiratory: breathing not labored, symmetric, on nasal canula  Gastrointestinal: soft non-tender, +bowel sounds  Musculoskeletal: no deformity, no tenderness to palpation  Skin: warm, dry  Neurologic: following commands, moving all extremities slowly  Psychiatric: full affect, no hallucinations, slow to respond  Other:       HISTORY:     Principal Problem:    Systolic CHF, acute on chronic ([address] (7/24/2017)    Active Problems:    Respiratory failure ([address] (7/24/2017)      CHF (congestive heart failure) ([address] (7/24/2017)      Past Medical History:   Diagnosis Date    Angina, class II ([address] 3/5/2013    ARF (acute renal failure) ([address] 12/19/2010    Atrial fibrillation ([address] 7/28/2014    Bleeding per rectum 11/6/2009    Coronary atherosclerosis of native coronary artery 5/7/2011    DJD (degenerative joint disease)     DNR (do not resuscitate) 11/29/2011    Gout 11/6/2009    High cholesterol 11/6/2009    Hypertension     IHD (ischemic heart disease) 11/6/2009    Kidney disease, chronic, stage III (moderate, EGFR 30-59 ml/min) 11/6/2009    Kidney disease, chronic, stage III (moderate, EGFR 30-59 ml/min) 11/6/2009    Obesity     TIA (transient ischemic attack) 1/14/2011      Past Surgical History:   Procedure Laterality Date    COLONOSCOPY  4/11/2005     Dr. Irizarry Settler HX CHOLECYSTECTOMY  11/94    HX KASSANDRA Medellin [address] [place], [place] [address]eat [facility]      Family History   Problem Relation Age of Onset    Stroke Other     Hypertension Other     Heart Disease Other       History reviewed, no pertinent family history.   Social History   Substance Use Topics    Smoking status: Never Smoker    Smokeless tobacco: Never Used    Alcohol use No     No Known Allergies   Current Facility-Administered Medications   Medication Dose Route Frequency    dextrose (D50W) 50% injection syrg        isosorbide mononitrate ER (IMDUR) tablet 30 mg  30 mg Oral DAILY    ondansetron (ZOFRAN) injection 4 mg  4 mg IntraVENous Q6H PRN    acetaminophen (TYLENOL) tablet 650 mg  650 mg Oral Q4H PRN    sodium chloride (NS) flush 5-10 mL  5-10 mL IntraVENous Q8H    sodium chloride (NS) flush 5-10 mL  5-10 mL IntraVENous PRN    heparin (porcine) injection 5,000 Units  5,000 Units SubCUTAneous Q12H    aspirin delayed-release tablet 81 mg  81 mg Oral DAILY    carvedilol (COREG) tablet 12.5 mg  12.5 mg Oral BID WITH MEALS    clopidogrel (PLAVIX) tablet 75 mg  75 mg Oral DAILY    potassium chloride SR (KLOR-CON 10) tablet 10 mEq  10 mEq Oral DAILY    pravastatin (PRAVACHOL) tablet 20 mg  20 mg Oral QHS    furosemide (LASIX) injection 40 mg  40 mg IntraVENous Q12H          LAB AND IMAGING FINDINGS:     Lab Results   Component Value Date/Time    WBC 3.4 07/26/2017 01:09 AM    HGB 9.3 07/26/2017 01:09 AM    PLATELET 224 91/42/6632 01:09 AM     Lab Results   Component Value Date/Time    Sodium 140 07/26/2017 01:09 AM    Potassium 3.1 07/26/2017 01:09 AM    Chloride 104 07/26/2017 01:09 AM    CO2 29 07/26/2017 01:09 AM    BUN 24 07/26/2017 01:09 AM    Creatinine 1.77 07/26/2017 01:09 AM    Calcium 7.7 07/26/2017 01:09 AM    Magnesium 2.0 06/21/2017 02:42 AM      Lab Results   Component Value Date/Time    AST (SGOT) 10 07/26/2017 01:09 AM    Alk.  phosphatase 68 07/26/2017 01:09 AM    Protein, total 7.7 07/26/2017 01:09 AM    Albumin 2.1 07/26/2017 01:09 AM    Globulin 5.6 07/26/2017 01:09 AM     Lab Results   Component Value Date/Time    INR 1.2 04/28/2017 10:43 AM    Prothrombin time 12.3 04/28/2017 10:43 AM    aPTT 25.6 04/28/2017 10:43 AM      Lab Results   Component Value Date/Time    Iron 53 03/21/2012 10:50 AM    TIBC 219 03/21/2012 10:50 AM    Iron % saturation 24 03/21/2012 10:50 AM      Lab Results   Component Value Date/Time    pH 7.39 06/17/2017 03:53 AM    PCO2 32 06/17/2017 03:53 AM    PO2 399 06/17/2017 03:53 AM     No components found for: [place]   Lab Results   Component Value Date/Time    CK 59 07/24/2017 08:23 AM    CK - MB <1.0 07/24/2017 08:23 AM                Total time: 35 minutes  Counseling / coordination time, spent as noted above: 20 minutes  > 50% counseling / coordination?: yes    Prolonged service was provided for  []30 min   []75 min in face to face time in the presence of the patient, spent as noted above. Time Start:   Time End:   Note: this can only be billed with 26845 (initial) or 38101 (follow up). If multiple start / stop times, list each separately.

## 2017-07-25 NOTE — PROGRESS NOTES
Pt noted to have a BS of 62 on morning labs, patient is not diabetic.  0142 Finger stick showed blood sugar of 61. Pt given 12.5G of dextrose IV and 4 oz orange juice.

## 2017-07-25 NOTE — PROGRESS NOTES
Problem: Falls - Risk of  Goal: *Absence of falls  Outcome: Progressing Towards Goal  Pt's bed is locked and in low position, call bell w/in reach and pt show and instructed to use call bell for help, x3 siderails in use

## 2017-07-25 NOTE — PROGRESS NOTES
Cardiology Progress Note      7/25/2017 5:51 PM    Admit Date: 7/24/2017    Admit Diagnosis: CHF (congestive heart failure) ([address] Respiratory failure (H*      Subjective:     Sondra Snyder has no specific complaints. \" I feel fair\". Visit Vitals    /78 (BP 1 Location: Left arm, BP Patient Position: At rest;Supine)    Pulse 63    Temp 98.5 °F (36.9 °C)    Resp 24    Wt 151 lb 0.2 oz (68.5 kg)    SpO2 99%    BMI 26.75 kg/m2       Current Facility-Administered Medications   Medication Dose Route Frequency    [START ON 7/26/2017] isosorbide mononitrate ER (IMDUR) tablet 30 mg  30 mg Oral DAILY    ondansetron (ZOFRAN) injection 4 mg  4 mg IntraVENous Q6H PRN    acetaminophen (TYLENOL) tablet 650 mg  650 mg Oral Q4H PRN    sodium chloride (NS) flush 5-10 mL  5-10 mL IntraVENous Q8H    sodium chloride (NS) flush 5-10 mL  5-10 mL IntraVENous PRN    heparin (porcine) injection 5,000 Units  5,000 Units SubCUTAneous Q12H    aspirin delayed-release tablet 81 mg  81 mg Oral DAILY    carvedilol (COREG) tablet 12.5 mg  12.5 mg Oral BID WITH MEALS    clopidogrel (PLAVIX) tablet 75 mg  75 mg Oral DAILY    potassium chloride SR (KLOR-CON 10) tablet 10 mEq  10 mEq Oral DAILY    pravastatin (PRAVACHOL) tablet 20 mg  20 mg Oral QHS    furosemide (LASIX) injection 40 mg  40 mg IntraVENous Q12H         Objective:      Physical Exam:  Visit Vitals    /78 (BP 1 Location: Left arm, BP Patient Position: At rest;Supine)    Pulse 63    Temp 98.5 °F (36.9 °C)    Resp 24    Wt 151 lb 0.2 oz (68.5 kg)    SpO2 99%    BMI 26.75 kg/m2     General Appearance:  Well developed, well nourished,alert and oriented x 3, and individual in no acute distress. Ears/Nose/Mouth/Throat:   Hearing grossly normal.         Neck: Supple. Chest:   Lungs clear to auscultation bilaterally. Cardiovascular:  Regular rate and rhythm, S1, S2 normal, no murmur. Abdomen:   Soft, non-tender, bowel sounds are active. Extremities: No edema bilaterally. Skin: Warm and dry. Data Review:   Labs:  Recent Results (from the past 24 hour(s))   URINALYSIS W/MICROSCOPIC    Collection Time: 07/24/17  6:33 PM   Result Value Ref Range    Color YELLOW/STRAW      Appearance CLEAR CLEAR      Specific gravity 1.011 1.003 - 1.030      pH (UA) 6.5 5.0 - 8.0      Protein NEGATIVE  NEG mg/dL    Glucose NEGATIVE  NEG mg/dL    Ketone NEGATIVE  NEG mg/dL    Bilirubin NEGATIVE  NEG      Blood NEGATIVE  NEG      Urobilinogen 0.2 0.2 - 1.0 EU/dL    Nitrites NEGATIVE  NEG      Leukocyte Esterase NEGATIVE  NEG      WBC 0-4 0 - 4 /hpf    RBC 0-5 0 - 5 /hpf    Epithelial cells FEW FEW /lpf    Bacteria NEGATIVE  NEG /hpf    Hyaline cast 0-2 0 - 5 /lpf   METABOLIC PANEL, COMPREHENSIVE    Collection Time: 07/25/17 12:46 AM   Result Value Ref Range    Sodium 141 136 - 145 mmol/L    Potassium 3.7 3.5 - 5.1 mmol/L    Chloride 105 97 - 108 mmol/L    CO2 27 21 - 32 mmol/L    Anion gap 9 5 - 15 mmol/L    Glucose 62 (L) 65 - 100 mg/dL    BUN 23 (H) 6 - 20 MG/DL    Creatinine 1.82 (H) 0.55 - 1.02 MG/DL    BUN/Creatinine ratio 13 12 - 20      GFR est AA 31 (L) >60 ml/min/1.73m2    GFR est non-AA 25 (L) >60 ml/min/1.73m2    Calcium 7.7 (L) 8.5 - 10.1 MG/DL    Bilirubin, total 0.8 0.2 - 1.0 MG/DL    ALT (SGPT) 7 (L) 12 - 78 U/L    AST (SGOT) 11 (L) 15 - 37 U/L    Alk.  phosphatase 64 45 - 117 U/L    Protein, total 7.5 6.4 - 8.2 g/dL    Albumin 2.0 (L) 3.5 - 5.0 g/dL    Globulin 5.5 (H) 2.0 - 4.0 g/dL    A-G Ratio 0.4 (L) 1.1 - 2.2     CBC WITH AUTOMATED DIFF    Collection Time: 07/25/17 12:46 AM   Result Value Ref Range    WBC 3.2 (L) 3.6 - 11.0 K/uL    RBC 3.52 (L) 3.80 - 5.20 M/uL    HGB 9.3 (L) 11.5 - 16.0 g/dL    HCT 28.7 (L) 35.0 - 47.0 %    MCV 81.5 80.0 - 99.0 FL    MCH 26.4 26.0 - 34.0 PG    MCHC 32.4 30.0 - 36.5 g/dL    RDW 14.8 (H) 11.5 - 14.5 %    PLATELET 793 859 - 840 K/uL    NEUTROPHILS 36 32 - 75 %    LYMPHOCYTES 49 12 - 49 %    MONOCYTES 13 5 - 13 %    EOSINOPHILS 2 0 - 7 %    BASOPHILS 0 0 - 1 %    ABS. NEUTROPHILS 1.2 (L) 1.8 - 8.0 K/UL    ABS. LYMPHOCYTES 1.5 0.8 - 3.5 K/UL    ABS. MONOCYTES 0.4 0.0 - 1.0 K/UL    ABS. EOSINOPHILS 0.1 0.0 - 0.4 K/UL    ABS. BASOPHILS 0.0 0.0 - 0.1 K/UL   GLUCOSE, POC    Collection Time: 07/25/17  1:42 AM   Result Value Ref Range    Glucose (POC) 61 (L) 65 - 100 mg/dL    Performed by [address], POC    Collection Time: 07/25/17  2:02 AM   Result Value Ref Range    Glucose (POC) 101 (H) 65 - 100 mg/dL    Performed by [facility]        Telemetry: normal sinus rhythm      Assessment:     Principal Problem:    Systolic CHF, acute on chronic ([address] (7/24/2017)    Active Problems:    Respiratory failure ([address] (7/24/2017)      CHF (congestive heart failure) ([address] (7/24/2017)        Plan:     Continue conservative therapy. Agree with palliative care.     Albert Arriaga MD

## 2017-07-25 NOTE — PROGRESS NOTES
Bedside shift change report given to Leroy Martin RN (oncoming nurse) by Michael Whitaker RN (offgoing nurse). Report included the following information SBAR.

## 2017-07-25 NOTE — PROGRESS NOTES
Hospitalist Progress Note    NAME: Daija Garcia   :  8/15/1911   MRN:  [id number]   LOS:   1      Assessment / Plan:  Acute hypoxic respiratory failure with acute pulmonary edema in setting of Acute systolic heart failure EF 20-25% / NSTEMI / Chronic afib  -pursuing conservative medical management due to her advanced age. Also, family does not wish to pursue any invasive management. does not wish CPR, shock.   -continue aspirin plavix  -continue coreg  -continue lasix IV BID  -restart imdur  -statin  -discussed the idea of hospice with granddaughter, she is still unsure but is considering it, she wants to discuss further with palliative care, I will consider asking for hospice info session for her    Lactic acidosis -resolved  Likely due to above, due to poor perfusion state       Chronic Afib  cont home BB      CKD    monitor closely with aggressive diureses, Cr stable today      Anemia of chronic disease no sings of bleeding  Hb stable     Hyperlipidemia, cont home statin   H/o stroke, cont ASA/Plavix                   Code Status: DDNR on file, confirmed with family  MPOA: rayo Trotter [id number]      DVT Prophylaxis: Heparin  GI Prophylaxis: not indicated      Baseline: currently at [facility] uses  wheelchair       Subjective:     Chief Complaint: sob  Patient says she feels okay, denies any pain or swelling at this time        Review of Systems:  Symptom Y/N Comments  Symptom Y/N Comments   Fever/Chills    Chest Pain     Poor Appetite    Edema     Cough    Abdominal Pain     Sputum    Joint Pain     SOB/MCNAMARA    Pruritis/Rash     Nausea/vomit    Tolerating PT/OT     Diarrhea    Tolerating Diet     Constipation    Other       Could NOT obtain due to:      Objective:     VITALS:   Last 24hrs VS reviewed since prior progress note.  Most recent are:  Patient Vitals for the past 24 hrs:   Temp Pulse Resp BP SpO2   17 1511 98.5 °F (36.9 °C) 63 24 149/78 99 %   17 1034 98.1 °F (36.7 °C) 66 18 149/71 100 %   07/25/17 0724 98.2 °F (36.8 °C) 64 18 143/89 100 %   07/25/17 0307 98.2 °F (36.8 °C) (!) 52 18 130/67 100 %   07/24/17 2307 98.2 °F (36.8 °C) 74 16 127/75 100 %   07/24/17 1957 97.6 °F (36.4 °C) 60 18 146/76 100 %       Intake/Output Summary (Last 24 hours) at 07/25/17 1713  Last data filed at 07/25/17 0547   Gross per 24 hour   Intake              120 ml   Output              800 ml   Net             -680 ml        PHYSICAL EXAM:  General: Alert, cooperative, no acute distress    EENT:  EOMI. Anicteric sclerae. Mucous membranes moist  Resp:  Decreased breath sounds, but no overt crackles  CV:  Regular rhythm, no edema  GI:  Soft, non distended, non tender. +Bowel sounds  Neurologic:  Alert slow to respond   Psych:   Good insight, not anxious nor agitated  Skin:  No rashes, no jaundice  ________________________________________________________________________  Care Plan discussed with:    Comments   Patient x    Family  x granddaughter   RN     Care Manager     Consultant                        Multidiciplinary team rounds were held today with , nursing, pharmacist and clinical coordinator. Patient's plan of care was discussed; medications were reviewed and discharge planning was addressed. ________________________________________________________________________  Total NON critical care TIME:  25   Minutes  ________________________________________________________________________  Anthony Parada MD     Procedures: see electronic medical records for all procedures/Xrays and details which were not copied into this note but were reviewed prior to creation of Plan. LABS:  I reviewed today's most current labs and imaging studies.   Pertinent labs include:  Recent Labs      07/25/17 0046  07/24/17   0823   WBC  3.2*  3.2*   HGB  9.3*  9.8*   HCT  28.7*  31.6*   PLT  192  231     Recent Labs      07/25/17 0046  07/24/17   0823   NA  141  139   K  3.7  4.1   CL  105  103   CO2  27  29 GLU  62*  105*   BUN  23*  23*   CREA  1.82*  1.98*   CA  7.7*  8.0*   ALB  2.0*  2.2*   TBILI  0.8  0.9   SGOT  11*  20   ALT  7*  9*       Signed: Jahaira Deleon MD

## 2017-07-26 NOTE — PROGRESS NOTES
Bedside shift change report given to Ana Justin RN (oncoming nurse) by Flo Gosselin, RN (offgoing nurse). Report included the following information SBAR, Intake/Output, MAR, Recent Results and Cardiac Rhythm AFib.

## 2017-07-26 NOTE — PROGRESS NOTES
1930 Received report from Tamara Mcguire and 308 Nilda Oh RN. Assumed patient care. 0330 Pt refused AM labs. Will make day shift nurse aware. [address] SHIFT NURSING NOTE    Bedside shift change report given to Josi Longoria RN (oncoming nurse) by Mark Magallanes RN (offgoing nurse). Report included the following information SBAR, Kardex, Intake/Output, MAR, Recent Results and Cardiac Rhythm AFib. SHIFT SUMMARY:         Admission Date 7/24/2017   Admission Diagnosis CHF (congestive heart failure) ([facility])  Respiratory failure ([facility]   Consults IP CONSULT TO CARDIOLOGY  IP CONSULT TO PALLIATIVE CARE - PROVIDER        Consults   [x] PT   [x] OT   [] Speech   [x] Palliative      [x] Hospice    [x] Case Management   [] None   Cardiac Monitoring   [x] Yes   [] No     Antibiotics   [] Yes   [x] No   GI Prophylaxis  (Ex: Protonix, Pepcid, etc,.)   [] Yes   [x] No          DVT Prophylaxis   SCDs:             Jed stockings:         [x] Medication (Ex: Lovenox, Eliquis, Brilinta, Coumadin,  Heparin, etc..)   [] Contraindicated   [] No VTE needed       Urinary Catheter             LDAs               Peripheral IV 07/25/17 Right Forearm (Active)   Site Assessment Clean, dry, & intact 7/26/2017  7:53 PM   Phlebitis Assessment 0 7/26/2017  7:53 PM   Infiltration Assessment 0 7/26/2017  7:53 PM   Dressing Status Clean, dry, & intact 7/26/2017  7:53 PM   Dressing Type Tape;Transparent 7/26/2017  7:53 PM   Hub Color/Line Status Blue;Flushed;Patent 7/26/2017  7:53 PM                      I/Os   Intake/Output Summary (Last 24 hours) at 07/27/17 0053  Last data filed at 07/26/17 1600   Gross per 24 hour   Intake               50 ml   Output             1550 ml   Net            -1500 ml         Activity Level Activity Level: Bed Rest     Activity Assistance: Complete care   Diet Active Orders   Diet    DIET CARDIAC Mechanical Soft      Purposeful Rounding every 1-2 hour?    [x] Yes    Darin Score  Total Score: 4   Bed Alarm (If score 3 or >)   [x] Yes [] Refused (See signed refusal form in chart)   Vinny Score  Vinny Score: 12       Vinny Score (if score 14 or less)   [x] PMT consult   [x] Nutrition consult   [x] Wound Care consult      []  Specialty bed         Influenza Vaccine Received Flu Vaccine for Current Season (usually Sept-March): Not Flu Season               Needs prior to discharge:   Home O2 required:    [] Yes   [x] No     If yes, how much O2 required?     Other:    Last Bowel Movement Date: 07/26/17   Readmission Risk Assessment Tool Score Low Risk            12       Total Score        3 Relationship with PCP    4 More than 1 Admission in calendar year    5 Patient Insurance is Medicare, Medicaid or Self Pay        Criteria that do not apply:    Patient Living Status    Patient Length of Stay > 5    Charlson Comorbidity Score       Expected Length of Stay 4d 14h   Actual Length of Stay 3

## 2017-07-26 NOTE — HOSPICE
[facility] RN note:  Consult noted. Reviewing chart. Not that grand dtr will be available to meet between 3:30 - 4:00pm today. 4:00pm----met with pt's grand daughter Drea Lew initially outside pt room. Discussed hospice philosophy and services as they relate to care at [facility] where pt plans to return. Conversation continued with pt in pt's room. All in agreement with hospice becoming involved once pt returns to [facility] though the word hospice was not used with pt per grand dtr's request.  Pt is 2  Dependent since previous admission about a month ago post NSTEMI. EF 20-25%. Information packet provided with 24hr contact information. Noemi Ran become quite tearful recalling her 's death 6 years ago, \"I've just had so many people die. This would be so much easier if my  were alive. \"      Thank you for the opportunity to care for this pt and family. Please contact hospice at [phone] with any questions or concerns.

## 2017-07-26 NOTE — WOUND CARE
Wound Care Consulted to see this patient for excoriated buttocks that was present on admission. This skin condition is actually vitaligo. Pt. Has some moisture in the gluteal cleft with mild fissures. EX strength Desitin will be ordered.    Kj Tee RN, BSN, [employer]

## 2017-07-26 NOTE — PROGRESS NOTES
PT consult received and chart reviewed. Noted pending meeting with hospice with family arriving for such at this time. Will f/u ahead for decision on evaluation once completed and/or if pursuit of hospice is decided. Liam Uribe PT, DPT.

## 2017-07-26 NOTE — PROGRESS NOTES
Hospitalist Progress Note    NAME: Maria C Hernandez   :  8/15/1911   MRN:  [id number]   LOS:   2      Assessment / Plan:  Acute hypoxic respiratory failure with acute pulmonary edema in setting of Acute systolic heart failure EF 20-25% / NSTEMI / Chronic afib  -pursuing conservative medical management due to her advanced age. Also, family does not wish to pursue any invasive management. does not wish CPR, shock.   -continue aspirin plavix  -continue coreg  -continue lasix IV BID -- will transition to PO soon  -restart imdur  -statin  -discussed with granddaughter, she is interested in patient receiving hospice services  -consulted hospice for information session    Lactic acidosis -resolved  Likely due to above, due to poor perfusion state       Chronic Afib  cont home BB      CKD   Cr improving slightly with diuresis      Anemia of chronic disease no sings of bleeding  Hb stable     Hyperlipidemia, cont home statin   H/o stroke, cont ASA/Plavix                   Code Status: DDNR on file, confirmed with family  MPOA: rayo Massey [id number]      DVT Prophylaxis: Heparin  GI Prophylaxis: not indicated      Baseline: currently at [facility] uses  wheelchair       Subjective:     Chief Complaint: sob  Feeling well, no SOB    Review of Systems:  Symptom Y/N Comments  Symptom Y/N Comments   Fever/Chills    Chest Pain     Poor Appetite    Edema     Cough    Abdominal Pain     Sputum    Joint Pain     SOB/MCNAMARA    Pruritis/Rash     Nausea/vomit    Tolerating PT/OT     Diarrhea    Tolerating Diet     Constipation    Other       Could NOT obtain due to:      Objective:     VITALS:   Last 24hrs VS reviewed since prior progress note.  Most recent are:  Patient Vitals for the past 24 hrs:   Temp Pulse Resp BP SpO2   17 1127 98.1 °F (36.7 °C) 74 14 120/70 100 %   17 0753 98.1 °F (36.7 °C) 63 16 140/68 99 %   17 0422 98.5 °F (36.9 °C) 81 18 (!) 163/92 98 %   17 2255 98 °F (36.7 °C) 78 18 145/58 97 %   07/25/17 1917 98.2 °F (36.8 °C) 77 18 121/53 100 %   07/25/17 1511 98.5 °F (36.9 °C) 63 24 149/78 99 %       Intake/Output Summary (Last 24 hours) at 07/26/17 1355  Last data filed at 07/26/17 1028   Gross per 24 hour   Intake               50 ml   Output             2450 ml   Net            -2400 ml        PHYSICAL EXAM:  General: Alert, cooperative, no acute distress    EENT:  EOMI. Anicteric sclerae. Mucous membranes moist  Resp:  Decreased breath sounds, but no overt crackles  CV:  Regular rhythm, no edema  GI:  Soft, non distended, non tender. +Bowel sounds  Neurologic:  Alert slow to respond   Psych:   Good insight, not anxious nor agitated  Skin:  No rashes, no jaundice  ________________________________________________________________________  Care Plan discussed with:    Comments   Patient x    Family  x granddaughter   RN     Care Manager x    Consultant                        Multidiciplinary team rounds were held today with , nursing, pharmacist and clinical coordinator. Patient's plan of care was discussed; medications were reviewed and discharge planning was addressed. ________________________________________________________________________  Total NON critical care TIME:  25   Minutes  ________________________________________________________________________  Maxcine MD Neo     Procedures: see electronic medical records for all procedures/Xrays and details which were not copied into this note but were reviewed prior to creation of Plan. LABS:  I reviewed today's most current labs and imaging studies.   Pertinent labs include:  Recent Labs      07/26/17   0109 07/25/17   0046  07/24/17   0823   WBC  3.4*  3.2*  3.2*   HGB  9.3*  9.3*  9.8*   HCT  30.4*  28.7*  31.6*   PLT  225  192  231     Recent Labs      07/26/17 0109 07/25/17   0046  07/24/17   0823   NA  140  141  139   K  3.1*  3.7  4.1   CL  104  105  103   CO2  29  27  29   GLU  66  62*  105*   BUN  24*  23*  23* CREA  1.77*  1.82*  1.98*   CA  7.7*  7.7*  8.0*   ALB  2.1*  2.0*  2.2*   TBILI  0.7  0.8  0.9   SGOT  10*  11*  20   ALT  6*  7*  9*       Signed: Jyoti Dejesus MD

## 2017-07-26 NOTE — PROGRESS NOTES
Consult received for hospice. I phoned and spoke with pt's granddaughter, Reynaldo Tanner to offer [facility]; she stated she wanted an agency that goes to [facility].  Hospice contracts with [facility] and referral sent via CC to [address] [place] [address] and I spoke with Radhika to inform her of the referral.  Ms. Robbi Preston will be here between 3:30 and 4:00 pm today. Radhika will plan to meet with pt and family in room at that time.     Sepideh Chang, [address] [place] [address] [phone]

## 2017-07-27 NOTE — PROGRESS NOTES
Per chart and CM, patient DC to SNF with hospice care and does not need therapy notation. Will complete order. Aquilino North PT, DPT.

## 2017-07-27 NOTE — PROGRESS NOTES
1920 Received report from [facility], RN. Assumed patient care. 2225 Pt refused Heparin. Stated \"I hate shots\". 0400 Pt refused morning labs. [address] SHIFT NURSING NOTE    Bedside shift change report given to Hillary Beckwith RN (oncoming nurse) by Brendan Kenny RN (offgoing nurse). Report included the following information SBAR, Kardex, Intake/Output, MAR, Recent Results and Cardiac Rhythm AFib. SHIFT SUMMARY:         Admission Date 7/24/2017   Admission Diagnosis CHF (congestive heart failure) ([facility])  Respiratory failure ([facility]   Consults IP CONSULT TO CARDIOLOGY  IP CONSULT TO PALLIATIVE CARE - PROVIDER        Consults   [x] PT   [x] OT   [] Speech   [x] Palliative      [x] Hospice    [x] Case Management   [] None   Cardiac Monitoring   [x] Yes   [] No     Antibiotics   [] Yes   [x] No   GI Prophylaxis  (Ex: Protonix, Pepcid, etc,.)   [] Yes   [x] No          DVT Prophylaxis   SCDs:             Jed stockings:         [x] Medication (Ex: Lovenox, Eliquis, Brilinta, Coumadin,  Heparin, etc..)   [] Contraindicated   [] No VTE needed       Urinary Catheter             LDAs               Peripheral IV 07/25/17 Right Forearm (Active)   Site Assessment Clean, dry, & intact 7/27/2017  7:16 PM   Phlebitis Assessment 0 7/27/2017  7:16 PM   Infiltration Assessment 0 7/27/2017  7:16 PM   Dressing Status Clean, dry, & intact 7/27/2017  7:16 PM   Dressing Type Tape;Transparent 7/27/2017  7:16 PM   Hub Color/Line Status Blue;Capped;Flushed;Patent 7/27/2017  7:16 PM                      I/Os No intake or output data in the 24 hours ending 07/28/17 0139      Activity Level Activity Level: Bed Rest     Activity Assistance: Complete care   Diet Active Orders   Diet    DIET CARDIAC Mechanical Soft      Purposeful Rounding every 1-2 hour?    [x] Yes    Darin Score  Total Score: 4   Bed Alarm (If score 3 or >)   [x] Yes    [] Refused (See signed refusal form in chart)   Vinny Score  Vinny Score: 14       Vinny Score (if score 14 or less)   [x] PMT consult   [x] Nutrition consult   [x] Wound Care consult      []  Specialty bed         Influenza Vaccine Received Flu Vaccine for Current Season (usually Sept-March): Not Flu Season               Needs prior to discharge:   Home O2 required:    [] Yes   [x] No     If yes, how much O2 required?     Other:    Last Bowel Movement Date: 07/27/17   Readmission Risk Assessment Tool Score Low Risk            12       Total Score        3 Relationship with PCP    4 More than 1 Admission in calendar year    5 Patient Insurance is Medicare, Medicaid or Self Pay        Criteria that do not apply:    Patient Living Status    Patient Length of Stay > 5    Charlson Comorbidity Score       Expected Length of Stay 4d 14h   Actual Length of Stay 4

## 2017-07-27 NOTE — HOSPICE
[facility] Worker Note:     LCSW and hospice RN met with pt to offer support and assess needs. Pt was alert with some confusion noted. Pt had no reports of pain and appeared comfortable. RN and LCSW discussed pt going back to [facility] tomorrow with hospice and pt was in agreement with this. Pt reported no needs at this time. LCSW will continue to monitor and assess needs.     [facility]    [phone]

## 2017-07-27 NOTE — PROGRESS NOTES
Occupational Therapy  Note plan for Hospice care and return to SNF; will complete order.  Isha Getting OTR/L

## 2017-07-27 NOTE — PROGRESS NOTES
Anticipate pt's discharge tomorrow returning to [facility] with Hospice provided by [address]. Pt will travel via ambulance and arrangements made with [facility] with a 12:30 pm . PCS on chart. Nursing will need to call report to ; send d/c instructions and most recent MAR with pt. Pt's granddaughter, Rachel Hathaway aware and in agreement with this plan.     Genesis Alanis, [address] [place] [address] [id number]

## 2017-07-27 NOTE — PROGRESS NOTES
Hospitalist Progress Note    NAME: Imtiaz Rubin   :  8/15/1911   MRN:  [id number]   LOS:   3      Assessment / Plan:  Acute hypoxic respiratory failure with acute pulmonary edema in setting of Acute systolic heart failure EF 20-25% / NSTEMI / Chronic afib  -pursuing conservative medical management due to her advanced age. Also, family does not wish to pursue any invasive management. does not wish CPR, shock  -plan for discharge with hospice services  -continue aspirin plavix  -continue coreg  -switch to PO lasix BID for now  -imdur  -statin    Lactic acidosis -resolved  Likely due to above, due to poor perfusion state       Chronic Afib  cont home BB      CKD   Cr improving slightly with diuresis      Anemia of chronic disease no sings of bleeding  Hb stable     Hyperlipidemia, cont home statin   H/o stroke, cont ASA/Plavix                   Code Status: DDNR on file, confirmed with family  MPOA: rayo Hsu [id number]      DVT Prophylaxis: Heparin  GI Prophylaxis: not indicated      Baseline: currently at [facility] uses  wheelchair       Subjective:     Chief Complaint: sob  Doing well, no complaints    Review of Systems:  Symptom Y/N Comments  Symptom Y/N Comments   Fever/Chills    Chest Pain     Poor Appetite    Edema     Cough    Abdominal Pain     Sputum    Joint Pain     SOB/MCNAMARA    Pruritis/Rash     Nausea/vomit    Tolerating PT/OT     Diarrhea    Tolerating Diet     Constipation    Other       Could NOT obtain due to:      Objective:     VITALS:   Last 24hrs VS reviewed since prior progress note.  Most recent are:  Patient Vitals for the past 24 hrs:   Temp Pulse Resp BP SpO2   17 1518 98 °F (36.7 °C) 67 15 132/78 94 %   17 1134 98.2 °F (36.8 °C) 70 14 127/84 98 %   17 0742 97.4 °F (36.3 °C) 70 16 155/88 100 %   17 0306 97.6 °F (36.4 °C) 81 18 151/83 99 %   17 0013 98.2 °F (36.8 °C) 66 18 131/80 100 %   17 1953 98 °F (36.7 °C) 72 20 126/78 98 % Intake/Output Summary (Last 24 hours) at 07/27/17 1523  Last data filed at 07/26/17 1600   Gross per 24 hour   Intake                0 ml   Output              650 ml   Net             -650 ml        PHYSICAL EXAM:  General: Alert, cooperative, no acute distress    EENT:  EOMI. Anicteric sclerae. Mucous membranes moist  Resp:  Decreased breath sounds, but no overt crackles  CV:  Regular rhythm, no edema  GI:  Soft, non distended, non tender. +Bowel sounds  Neurologic:  Alert slow to respond   Psych:   Good insight, not anxious nor agitated  Skin:  No rashes, no jaundice  ________________________________________________________________________  Care Plan discussed with:    Comments   Patient x    Family      RN x    Care Manager x    Consultant                        Multidiciplinary team rounds were held today with , nursing, pharmacist and clinical coordinator. Patient's plan of care was discussed; medications were reviewed and discharge planning was addressed. ________________________________________________________________________  Total NON critical care TIME:  25   Minutes  ________________________________________________________________________  Karthik Grimm MD     Procedures: see electronic medical records for all procedures/Xrays and details which were not copied into this note but were reviewed prior to creation of Plan. LABS:  I reviewed today's most current labs and imaging studies.   Pertinent labs include:  Recent Labs      07/26/17   0109  07/25/17   0046   WBC  3.4*  3.2*   HGB  9.3*  9.3*   HCT  30.4*  28.7*   PLT  225  192     Recent Labs      07/26/17   0109  07/25/17   0046   NA  140  141   K  3.1*  3.7   CL  104  105   CO2  29  27   GLU  66  62*   BUN  24*  23*   CREA  1.77*  1.82*   CA  7.7*  7.7*   ALB  2.1*  2.0*   TBILI  0.7  0.8   SGOT  10*  11*   ALT  6*  7*       Signed: Karthik Grimm MD

## 2017-07-27 NOTE — PROGRESS NOTES
1900 - bedside shift report given to MARY Basurto. Pt refused am labs today and her heparin injection, Dr. Usha Stoddard is aware, no new orders.

## 2017-07-28 NOTE — PROGRESS NOTES
Bedside shift change report given to Jeanine Norton RN (oncoming nurse) by Consuelo Forman RN (offgoing nurse). Report included the following information SBAR, Kardex, MAR, Accordion and Recent Results. 11:32 AM  Hospital to  [facility]                                                                         80 y.o.   female    [address]   Room: [facility] 2 CARDIOPULMONARY CARE  Unit Phone# :  [phone]      [address]  [place] [address]  Dept: [phone]  Loc: [phone]                    SITUATION     Admitted:  7/24/2017         Attending Provider:  Christina Sevilla MD       Consultations:  IP CONSULT TO CARDIOLOGY  IP CONSULT TO PALLIATIVE CARE - PROVIDER    PCP:  Dakota He MD   [phone]    Treatment Team: Attending Provider: Christina Sevilla MD; Consulting Provider: Joselyn Leblanc MD; Consulting Provider: Lee Montenegro MD; Consulting Provider: Brianna Nair NP; Utilization Review:  Andi Dave RN; Care Manager: LAILA Ellsworth    Admitting Dx:  CHF (congestive heart failure) ([facility]  Respiratory failure ([facility]       Principal Problem: Systolic CHF, acute on chronic ([facility]    * No surgery found * of      BY: * Surgery not found *             ON: * No surgery found *                  Code Status: DNR                Advance Directives:   Advance Care Planning 7/24/2017   Patient's Healthcare Decision Maker is: Named in scanned ACP document   Primary Decision Maker Name Denise [facility] [address] [facility] Phone Number [phone]   Primary Decision Maker Relationship to Patient Grandparent   Confirm Advance Directive Yes, on file   Does the patient have other document types Do Not Resuscitate    (Send w/patient)   No Doesnt Have       Isolation:  There are currently no Active Isolations       MDRO: No current active infections    Pain Medications given:  n/a    Last dose: n/a at      Special Equipment needed: no  Type of equipment:    (Not currently on dialysis)  (Not currently on dialysis)  (Not currently on dialysis)     BACKGROUND     Allergies:  No Known Allergies    Past Medical History:   Diagnosis Date    Angina, class II ([facility] 3/5/2013    ARF (acute renal failure) ([facility] 12/19/2010    Atrial fibrillation ([facility] 7/28/2014    Bleeding per rectum 11/6/2009    Coronary atherosclerosis of native coronary artery 5/7/2011    DJD (degenerative joint disease)     DNR (do not resuscitate) 11/29/2011    Gout 11/6/2009    High cholesterol 11/6/2009    Hypertension     IHD (ischemic heart disease) 11/6/2009    Kidney disease, chronic, stage III (moderate, EGFR 30-59 ml/min) 11/6/2009    Kidney disease, chronic, stage III (moderate, EGFR 30-59 ml/min) 11/6/2009    Obesity     TIA (transient ischemic attack) 1/14/2011       Past Surgical History:   Procedure Laterality Date    COLONOSCOPY  4/11/2005     Dr. Fleming Beverage  11/94    [address] [facility]       Prescriptions Prior to Admission   Medication Sig    traMADol (ULTRAM) 50 mg tablet Take 1 Tab by mouth every eight (8) hours as needed for Pain. Max Daily Amount: 150 mg.  potassium chloride SR (KLOR-CON 10) 10 mEq tablet Take 1 Tab by mouth daily.  isosorbide mononitrate ER (IMDUR) 30 mg tablet Take 30 mg by mouth daily.  pravastatin (PRAVACHOL) 20 mg tablet Take 20 mg by mouth nightly. Indications: hypercholesterolemia    cholecalciferol (VITAMIN D3) 1,000 unit tablet Take 1,000 Units by mouth daily.  carvedilol (COREG) 12.5 mg tablet TAKE 1 AND 1/2 TABLETS BY MOUTH TWICE DAILY WITH MEALS    clopidogrel (PLAVIX) 75 mg tablet TAKE 1 TABLET BY MOUTH DAILY.  aspirin delayed-release 81 mg tablet Take 81 mg by mouth daily.     magnesium chloride (MAG DELAY) 64 mg tablet Take 64 mg by mouth daily.    bisacodyl (DULCOLAX, BISACODYL,) 10 mg suppository Insert 10 mg into rectum as needed (constipation). If not BM from MOM/lax day 4    mineral oil (FLEET) enema Insert  into rectum as needed for Constipation. If not BM from bisacodyl supp day 5    magnesium hydroxide (SANDOVAL MILK OF MAGNESIA) 400 mg/5 mL suspension Take 30 mL by mouth daily as needed for Constipation. If no BM day 3    acetaminophen (TYLENOL) 325 mg tablet Take 2 Tabs by mouth every four (4) hours as needed.  colchicine 0.6 mg tablet Take 0.6 mg by mouth daily as needed (gout).  loperamide (IMODIUM) 2 mg capsule Take 2 mg by mouth every six (6) hours as needed for Diarrhea. Hard scripts included in transfer packet no    Vaccinations:    Immunization History   Administered Date(s) Administered    Influenza Vaccine 09/30/2013, 10/29/2014, 10/15/2015    Influenza Vaccine Whole 10/20/2010    Pneumococcal Conjugate (PCV-13) 01/13/2016, 03/07/2016    TB Skin Test (PPD) Intradermal 07/07/2016    TD Vaccine 08/13/2010    ZZZ-RETIRED (DO NOT USE) Pneumococcal Vaccine (Unspecified Type) 01/01/2010       Readmission Risks:    Known Risks: The Charlson CoMorbitiy Index tool is an evidenced based tool that has more automatic generated information. The tool looks at many different items such as the age of the patient, how many times they were admitted in the last calendar year, current length of stay in the hospital and their diagnosis. All of these items are pulled automatically from information documented in the chart from various places and will generate a score that predicts whether a patient is at low (less than 13), medium (13-20) or high (21 or greater) risk of being readmitted.         ASSESSMENT                Temp: 98.1 °F (36.7 °C) (07/28/17 0743) Pulse (Heart Rate): 70 (07/28/17 0743)     Resp Rate: 16 (07/28/17 0743)           BP: 140/60 (07/28/17 0743)     O2 Sat (%): 100 % (07/28/17 0743)     Weight: 64.9 kg (143 lb 1.3 oz)    Height: (not recorded)       If above not within 1 hour of discharge:    BP:_____  P:____  R:____ T:_____ O2 Sat: ___%  O2: ______    Active Orders   Diet    DIET CARDIAC Mechanical Soft         Orientation: only aware of  place and person     Active Behaviors: None                                   Active Lines/Drains:  (Peg Tube / Peterson / CL or S/L?): no    Urinary Status: External catheter     Last BM: Last Bowel Movement Date: 07/27/17     Skin Integrity: Intact        Wound Buttocks Posterior-DRESSING TYPE: Open to air    Mobility: Very limited   Weight Bearing Status: WBAT (Weight Bearing as Tolerated)                Lab Results   Component Value Date/Time    Glucose 66 07/26/2017 01:09 AM    Hemoglobin A1c 4.9 04/29/2017 01:24 AM    INR 1.2 04/28/2017 10:43 AM    INR 1.1 12/12/2010 12:45 PM    INR (POC) 1.2 04/28/2017 10:31 AM    HGB 9.3 07/26/2017 01:09 AM    HGB 9.3 07/25/2017 12:46 AM        RECOMMENDATION     See After Visit Summary (AVS) for:  · Discharge instructions  · After [address]   · Special equipment needed (entered pre-discharge by Care Management)  · Medication Reconciliation    · Follow up Appointment(s)         Report given/sent by:  Gifty Cheung                    Verbal report given to:  Jose Lara  FAXED to:           Estimated discharge time:  7/28/2017 at 1230

## 2017-07-28 NOTE — PROGRESS NOTES
Pt is being discharged today returning to [facility]. She will be admitted to hospice care provided by [facility] today when she returns. [facility] team aware. Pt will travel via ambulance with a 12:30 pm  scheduled, PCS on chart. Nursing to call report to , send d/c instructions, most recent MAR, and any Rx. Care Management Interventions  PCP Verified by CM: Yes  Mode of Transport at Discharge: BLS ([facility])  Transition of Care Consult (CM Consult): SNF ([facility])  Partner SNF: Yes  Discharge Durable Medical Equipment: No  Physical Therapy Consult: No  Occupational Therapy Consult: No  Speech Therapy Consult: No  Current Support Network:  Other (long term care resident at [facility])  Confirm Follow Up Transport: Other (see comment)  Plan discussed with Pt/Family/Caregiver: Yes  Freedom of Choice Offered: Yes ([facility])  Discharge Location  Discharge Placement: Skilled nursing facility ([facility])    Laly Barry, [address]

## 2017-07-28 NOTE — HOSPICE
[facility] coordination note:    Care Coordination: Discussed plans with Oneyda Menchaca / granddaughter and she plans to meet with hospice MARY Velazco at [facility] at [facility] getting DDNR signed by Dr. Phil Mancia and coordinated with care manger Pop Tate. Will be Transfered to [facility] with hospital discharge at 1230 and hospice admit at 2:00 pm     Thank you for asking us to be a part of the care for this loved lady and her family.     Kristian Mendes RN [facility]

## 2017-07-28 NOTE — HOSPICE
[address] [facility]  [phone]    Inpatient Nursing PRE Admission   Patient Name: Suellyn Carrel  YOB: 1911  Age: 80 y.o. NOTE   DDNR signed today by Dr Jazmine Whitaker and needs granddaughter's signature and granddaughter Vickie Acosta has been advised  One had been in place for years yet not filled out correctly. Confusion/dementia not documented yet I talked with Ms. Denver Green for a bit and she had no short term memory. Family prefers we avoid the \"Hospsice\" word if possible     Date of PLANNED Hospice Admission: 17  Hospice Attending:   Primary Care Physician: Imani Galvin MD     Home Hospice Zip Code: [address] [place] [address] Drive  Expected  (if patient transferred to [facility]):      ADVANCE CARE PLANNING    Code Status: DNR  Durable DNR: []  Yes  []  No  Advance Care Planning 2017   Patient's Healthcare Decision Maker is: Named in scanned ACP document   Primary Decision Maker Name Pepper Bright   Primary Decision Maker Phone Number [phone]   Primary Decision Maker Relationship to Patient Grandparent   Confirm Advance Directive Yes, on file   Does the patient have other document types Do Not Resuscitate       Ep[employer]: Nondenominational   Home:     HOSPICE SUMMARY   ER Visits/ Hospitalizations in past year:   This admit CHF     Respiratory failure     post acute CVA    Hospice Diagnosis: End stage cardiac disease   Onset Date of Hospice Diagnosis: Not noted  Summary of Disease Progression Leading to Hospice Diagnosis: Patient admitted this admit  with acute hypoxic respiratory failure with acute pulmonary edema in setting of acute systolic heart failure with EF 20-25%     Co-Morbidities:   Patient Active Problem List   Diagnosis Code    Gout M10.9    High cholesterol E78.00    Kidney disease, chronic, stage III (moderate, EGFR 30-59 ml/min) N18.3    DJD (degenerative joint disease), multiple sites M15.9    Insomnia G47.00    Right lumbar radiculopathy M54.16    Anxiety reaction F41.1    TIA (transient ischemic attack) G45.9    Carpal tunnel syndrome G56.00    Coronary atherosclerosis of native coronary artery I25.10    DNR (do not resuscitate) Z73    Advanced age    Erazo Bur Obesity E66.9    Angina, class II ([address] I20.9    Atrial fibrillation ([facility]) I48.91    Essential hypertension I10    Diuretic-induced hypokalemia E87.6, T50.2X5A    CVA, old, alterations of sensations I69.398, R20.9    Altered mental status R41.82    Pulmonary edema J81.1    NSTEMI (non-ST elevated myocardial infarction) ([facility]) I21.4    Ischemic cardiomyopathy I25.5    Respiratory failure ([facility]) J96.90    CHF (congestive heart failure) ([facility]) Z86.3    Systolic CHF, acute on chronic ([facility]) I50.23     Diagnoses RELATED to the terminal prognosis: As above  Essential HTN  CAD  Ischemic cardiomyopathy High CHO   Larisa disease stage III with EGFR 30-59  CVA  Angina Atrial fibrillation    Other Diagnoses:    Rationale for a prognosis of life expectancy of 6 months or less if the disease follows its normal course (Disease Specific History):     Jai Juárez is a 80 y. o. who was admitted to [facility]. The patient's principle diagnosis of End stage cardiac disease has resulted in patient being Oxygen dependent  Dependent in 100% assist with all ADLs. .  Functionally, the patient's Palliative Performance Scale has declined over a period of 3 months  and is estimated at 30  . Objective information that support this patients limited prognosis includes:   ECHO 6/19  Left ventricle:  Systolic function markedly reduced  EF 20-25%  Mid anteroseptal and apical wall thickness at upper levels of normal  Right and      MV  Severe regurgitation   Moderate Aortic Stenosis   Tricuspid valve moderate to severe regurgitation. Left Atrium severely dilated   Lab values as below   The patient/family chose comfort measures with the support of Hospice.       CLINICAL INFORMATION   Allergies: No Known Allergies    Wt Readings from Last 3 Encounters:   07/27/17 64.9 kg (143 lb 1.3 oz)   06/21/17 64.3 kg (141 lb 11.2 oz)   05/01/17 71.3 kg (157 lb 3 oz)     Ht Readings from Last 3 Encounters:   06/17/17 5' 3\" (1.6 m)   04/28/17 5' 3\" (1.6 m)   04/28/17 5' 3\" (1.6 m)     Body mass index is 25.35 kg/(m^2). Visit Vitals    /60    Pulse 70    Temp 98.1 °F (36.7 °C)    Resp 16    Wt 64.9 kg (143 lb 1.3 oz)    SpO2 100%    BMI 25.35 kg/m2       LAB VALUES    Anemia    BUN 24  CR 1.77   GFR 26        Lab Results   Component Value Date/Time    Protein, total 7.7 07/26/2017 01:09 AM    Albumin 2.1 07/26/2017 01:09 AM       Currently this patient has:  [x] Supplemental O2        Progression to DEPENDENCE WITH ADLs (include time frame): all ADLs within past 3 months      ASSESSMENT & PLAN     1. Symptom Issues Identified: O2 dependent      2. Spiritual Issues Identified:     3. Psych/ Social/ Emotional Issues Identified: MPOA is in place and Rosa Crowder is MPOMONTEZ Viera's  passed 6 months ago so this is especially difficult    4. Care Coordination:   Transfer to: [facility]    Report given to: Admissions nurse Lindsay Alba    Transportation by: JACKI   Scheduled for 1230    Medications Needs: see discharge orders    DME: Oxygen    Supplies:  To be determined

## 2017-07-29 NOTE — DISCHARGE SUMMARY
Hospitalist Discharge Summary     Patient ID:  Susan Gongora  [id number]  89 y.o.  8/15/1911    PCP on record: Israel Jaimes MD    Admit date: 7/24/2017  Discharge date: 7/28/2017      DISCHARGE DIAGNOSES  DISCHARGE SUMMARY/HOSPITAL COURSE: for full details see H&P, daily progress notes, labs, consult notes. Acute hypoxic respiratory failure with acute pulmonary edema in setting of Acute systolic heart failure EF 20-25% Chronic afib  NSTEMI - presumed new, given symptoms and troponin up again at 1.64   -pursuing conservative medical management due to her advanced age. Also, family does not wish to pursue any invasive management. does not wish CPR, shock  -plan for discharge with hospice services  -continue aspirin plavix  -continue coreg  -switch to PO lasix BID for now  -imdur  -statin     Lactic acidosis -resolved  Likely due to above, due to poor perfusion state       Chronic Afib  cont home BB       CKD   Cr improving slightly with diuresis      Anemia of chronic disease no sings of bleeding  Hb stable      Hyperlipidemia, cont home statin   H/o stroke, cont ASA/Plavix                   Code Status: DDNR on file, confirmed with family  MPOA: rayo Dante Petit [id number]      CONSULTATIONS:  IP CONSULT TO CARDIOLOGY    Excerpted HPI from H&P of Pedrito Ray MD:  Arielle Diaz is a 81 yo female  who presents with above complaint. cannot contribute to the history. History was obtained from granddtr at bedside  And er records BIB EMS to ED [facility] ED for increased work of breathing and respiratory distress. EMS states that the pt resides at [facility] following a CVA earlier this year. On EMS arrival pt was in severe respiratory distress and hypoxic with O2 at low 80th. She was placed on NRB 15L by EMS. On arrival to ED pt continue to be in significant respiratory distress. She was placed on BIPAP. Now in the er on nasal canula.  No reports of fevers or chills or abd pain or n/v.     _______________________________________________________________________  Patient seen and examined by me on discharge day. Pertinent Findings:  Gen:    Not in distress  Chest: Clear lungs  CVS:   Regular rhythm. No edema  Abd:  Soft, not distended, not tender  Neuro:  Alert, calm  _______________________________________________________________________  DISCHARGE MEDICATIONS:   Discharge Medication List as of 7/28/2017 11:09 AM      CONTINUE these medications which have CHANGED    Details   furosemide (LASIX) 40 mg tablet Take 1 Tab by mouth two (2) times a day., Normal, Disp-30 Tab, R-1         CONTINUE these medications which have NOT CHANGED    Details   traMADol (ULTRAM) 50 mg tablet Take 1 Tab by mouth every eight (8) hours as needed for Pain. Max Daily Amount: 150 mg., Print, Disp-40 Tab, R-0      potassium chloride SR (KLOR-CON 10) 10 mEq tablet Take 1 Tab by mouth daily. , Print, Disp-30 Tab, R-1      isosorbide mononitrate ER (IMDUR) 30 mg tablet Take 30 mg by mouth daily. , Historical Med      cholecalciferol (VITAMIN D3) 1,000 unit tablet Take 1,000 Units by mouth daily. , Historical Med      pravastatin (PRAVACHOL) 20 mg tablet Take 20 mg by mouth nightly. Indications: hypercholesterolemia, Historical Med      carvedilol (COREG) 12.5 mg tablet TAKE 1 AND 1/2 TABLETS BY MOUTH TWICE DAILY WITH MEALS, Normal, Disp-90 Tab, R-5      clopidogrel (PLAVIX) 75 mg tablet TAKE 1 TABLET BY MOUTH DAILY., Normal, Disp-30 Tab, R-11      aspirin delayed-release 81 mg tablet Take 81 mg by mouth daily. , Historical Med      magnesium chloride (MAG DELAY) 64 mg tablet Take 64 mg by mouth daily. , Historical Med      bisacodyl (DULCOLAX, BISACODYL,) 10 mg suppository Insert 10 mg into rectum as needed (constipation). If not BM from MOM/lax day 4, Historical Med      mineral oil (FLEET) enema Insert  into rectum as needed for Constipation.  If not BM from bisacodyl supp day 5, Historical Med      magnesium hydroxide (SANDOVAL MILK OF MAGNESIA) 400 mg/5 mL suspension Take 30 mL by mouth daily as needed for Constipation. If no BM day 3, Historical Med      acetaminophen (TYLENOL) 325 mg tablet Take 2 Tabs by mouth every four (4) hours as needed. , Print, Disp-40 Tab, R-0      colchicine 0.6 mg tablet Take 0.6 mg by mouth daily as needed (gout). , Historical Med      loperamide (IMODIUM) 2 mg capsule Take 2 mg by mouth every six (6) hours as needed for Diarrhea., Historical Med             My Recommended Diet, Activity, Wound Care, and follow-up labs are listed in the patient's Discharge Insturctions which I have personally completed and reviewed.     _______________________________________________________________________  DISPOSITION:    Home with Family:    Home with HH/PT/OT/RN:    SNF/LTC: With hospice services   TRENT:    OTHER:        Condition at Discharge:  Stable  _______________________________________________________________________  Follow up with:   PCP : Katarina Fermin MD  Follow-up Information     Follow up With Details Comments [address] [place] [address]  [phone]                Total time in minutes spent coordinating this discharge (includes going over instructions, follow-up, prescriptions, and preparing report for sign off to her PCP) :  40 minutes    Signed:  Jed Ho MD

## 2017-08-14 PROCEDURE — 0651 HSPC ROUTINE HOME CARE

## 2017-08-15 ENCOUNTER — HOME CARE VISIT (OUTPATIENT)
Dept: HOSPICE | Facility: HOSPICE | Age: 82
End: 2017-08-15
Payer: MEDICARE

## 2017-08-15 PROCEDURE — 0651 HSPC ROUTINE HOME CARE

## 2017-08-22 ENCOUNTER — HOME CARE VISIT (OUTPATIENT)
Dept: HOSPICE | Facility: HOSPICE | Age: 82
End: 2017-08-22
Payer: MEDICARE

## 2020-03-02 NOTE — PROGRESS NOTES
[address] [address]  [phone]      Cardiology Progress Note      6/19/2017 1:03 PM    Admit Date: 6/17/2017    Admit Diagnosis:   Pulmonary edema  NSTEMI (non-ST elevated myocardial infarction) [facility])    Subjective:     Jossy Sanchez is a 80 y.o. female who was admitted with NSTEMI. Overnight events:  -VSS  -weight down 2#/ I/O neg 1 L  -creat 2.3  -Ms. Fletcher De Souza states she is feeling \"sick\". She endorses nausea, headache, and fatigue. She denies chest pain, SOB, or palpitations.       Visit Vitals    /59 (BP 1 Location: Left arm, BP Patient Position: At rest)    Pulse 82    Temp 98.5 °F (36.9 °C)    Resp 18    Ht 5' 3\" (1.6 m)    Wt 64.3 kg (141 lb 11.2 oz)    SpO2 100%    Breastfeeding No    BMI 25.1 kg/m2       Current Facility-Administered Medications   Medication Dose Route Frequency    [START ON 6/20/2017] enoxaparin (LOVENOX) injection 30 mg  30 mg SubCUTAneous DAILY    albuterol-ipratropium (DUO-NEB) 2.5 MG-0.5 MG/3 ML  3 mL Nebulization Q6H PRN    aspirin delayed-release tablet 81 mg  81 mg Oral DAILY    carvedilol (COREG) tablet 12.5 mg  12.5 mg Oral BID WITH MEALS    cholecalciferol (VITAMIN D3) tablet 1,000 Units  1,000 Units Oral DAILY    clopidogrel (PLAVIX) tablet 75 mg  75 mg Oral DAILY    potassium chloride SR (KLOR-CON 10) tablet 10 mEq  10 mEq Oral BID    pravastatin (PRAVACHOL) tablet 20 mg  20 mg Oral QHS    traMADol (ULTRAM) tablet 50 mg  50 mg Oral Q8H PRN    sodium chloride (NS) flush 5-10 mL  5-10 mL IntraVENous Q8H    sodium chloride (NS) flush 5-10 mL  5-10 mL IntraVENous PRN    acetaminophen (TYLENOL) tablet 650 mg  650 mg Oral Q4H PRN    ondansetron (ZOFRAN) injection 4 mg  4 mg IntraVENous Q4H PRN    furosemide (LASIX) injection 40 mg  40 mg IntraVENous BID    nitroglycerin (NITROBID) 2 % ointment 1 Inch  1 Inch Topical Q6H       Objective:      Physical Exam:  General Appearance:  elderly AAF resting in bed in Problem: Pain:  Goal: Pain level will decrease  Description  Pain level will decrease  3/1/2020 2330 by Goldie Gray RN  Outcome: Ongoing  3/1/2020 1609 by Gurdeep Vinson RN  Outcome: Ongoing  Goal: Control of acute pain  Description  Control of acute pain  3/1/2020 2330 by Goldie Gray RN  Outcome: Ongoing  3/1/2020 1609 by Gurdeep Vinson RN  Outcome: Ongoing  Goal: Control of chronic pain  Description  Control of chronic pain  3/1/2020 2330 by Goldie Gray RN  Outcome: Ongoing  3/1/2020 1609 by Gurdeep Vinson RN  Outcome: Ongoing  Goal: Patient's pain/discomfort is manageable  Description  Patient's pain/discomfort is manageable  3/1/2020 2330 by Goldie Gray RN  Outcome: Ongoing  3/1/2020 1609 by Gurdeep Vinson RN  Outcome: Ongoing     Problem: Infection:  Goal: Will remain free from infection  Description  Will remain free from infection  3/1/2020 2330 by Goldie Gray RN  Outcome: Ongoing  3/1/2020 1609 by Gurdeep Vinson RN  Outcome: Ongoing     Problem: Safety:  Goal: Free from accidental physical injury  Description  Free from accidental physical injury  3/1/2020 2330 by Goldie Gray RN  Outcome: Ongoing  3/1/2020 1609 by Gurdeep Vinson RN  Outcome: Ongoing  Goal: Free from intentional harm  Description  Free from intentional harm  3/1/2020 2330 by Goldie Gray RN  Outcome: Ongoing  3/1/2020 1609 by Gurdeep Vinson RN  Outcome: Ongoing     Problem: Daily Care:  Goal: Daily care needs are met  Description  Daily care needs are met  3/1/2020 2330 by Goldie Gray RN  Outcome: Ongoing  3/1/2020 1609 by Gurdeep Vinson RN  Outcome: Ongoing     Problem: Skin Integrity:  Goal: Skin integrity will stabilize  Description  Skin integrity will stabilize  3/1/2020 2330 by Goldie Gray RN  Outcome: Ongoing  3/1/2020 1609 by Gurdeep Vinson RN  Outcome: Ongoing     Problem: Discharge Planning:  Goal: Patients continuum of care needs are met  Description  Patients continuum of care NAD  Chest:   Clear  Cardiovascular:  Regular rate and rhythm, no murmur.   Abdomen:   Soft, non-tender, bowel sounds are active.   Extremities: palpable distal pulses, no edema. Skin:  Warm and dry.     Data Review:   Recent Labs      06/19/17 0229 06/18/17 0342 06/17/17   0409   WBC  3.5*  3.8  4.5   HGB  9.0*  8.4*  9.6*   HCT  27.8*  25.2*  30.4*   PLT  253  232  302     Recent Labs      06/19/17 0229 06/18/17 0342 06/17/17   0409   NA  142  142  139   K  3.8  3.7  4.5   CL  108  109*  108   CO2  23  21  20*   GLU  88  71  139*   BUN  39*  41*  39*   CREA  2.30*  2.18*  2.30*   CA  8.3*  8.3*  8.3*   MG  2.1  2.2   --    ALB   --    --   2.5*   TBILI   --    --   0.4   SGOT   --    --   23   ALT   --    --   15       Recent Labs      06/18/17 0342 06/17/17   0409   TROIQ  2.63*  3.24*   CPK   --   70   CKMB   --   1.2         Intake/Output Summary (Last 24 hours) at 06/19/17 1425  Last data filed at 06/19/17 2341   Gross per 24 hour   Intake                0 ml   Output             1325 ml   Net            -1325 ml        Telemetry: A-fib 80s  EKG: a-fib  Cxray: \"IMPRESSION: There is increased haziness in the midlungs and bibasilar  atelectasis/effusions consistent with moderate edema. \"  ECHO: EF 20-25%; hypokinesis of the mid anteroseptal and apical septal walls. DOMINGO. Mod-sever MR; mod AS; mod to severe TR    Assessment:     Principal Problem:    NSTEMI (non-ST elevated myocardial infarction) ([facility] (6/17/2017)    Active Problems:    Atrial fibrillation ([facility] (7/28/2014)      Pulmonary edema (6/17/2017)      Ischemic cardiomyopathy (6/19/2017)        Plan:     NSTEMI:  Patient wants medical management. ECHO with greatly reduced EF of 20-25%. · Continue ASA, BB, statin, nitrate, plavix, lasix    · Will wait to add any ACEi at this time due to kidney function   · Patient poor candidate to consider any future ICD placement at her age    A-fib: rate controlled.  Not an anticoagulant needs are met  3/1/2020 2330 by Alex Siddiqui RN  Outcome: Ongoing  3/1/2020 1609 by Kat Guerrero RN  Outcome: Ongoing     Problem: Falls - Risk of:  Goal: Will remain free from falls  Description  Will remain free from falls  3/1/2020 2330 by Alex Siddiqui RN  Outcome: Ongoing  3/1/2020 1609 by Kat Guerrero RN  Outcome: Ongoing  Note:   Patient remained free from injury. Patient verbalized understanding of need for the safety precautions. Demonstrates proper use of assistive devices. Bed remains in the lowest position. Call light remains within reach. [employer] [place] [employer] in use.      Goal: Absence of physical injury  Description  Absence of physical injury  3/1/2020 2330 by Alex Siddiqui RN  Outcome: Ongoing  3/1/2020 1609 by Kat Guerrero RN  Outcome: Ongoing     Problem: Skin Integrity:  Goal: Will show no infection signs and symptoms  Description  Will show no infection signs and symptoms  3/1/2020 2330 by Alex Siddiqui RN  Outcome: Ongoing  3/1/2020 1609 by Kat Guerrero RN  Outcome: Ongoing  Goal: Absence of new skin breakdown  Description  Absence of new skin breakdown  3/1/2020 2330 by Alex Siddiqui RN  Outcome: Ongoing  3/1/2020 1609 by Kat Guerrero RN  Outcome: Ongoing     Problem: Musculor/Skeletal Functional Status  Goal: Highest potential functional level  3/1/2020 2330 by Alex Siddiqui RN  Outcome: Ongoing  3/1/2020 1609 by Kat Guerrero RN  Outcome: Ongoing     Problem: HEMODYNAMIC STATUS  Goal: Patient has stable vital signs and fluid balance  3/1/2020 2330 by Alex Siddiqui RN  Outcome: Ongoing  3/1/2020 1609 by Kat Guerrero RN  Outcome: Ongoing  Note:   Neuro assessment completed, fall precautions in place, aspirations precautions in place, assess for barriers in communication and mobility, interventions to assist in communication and mobility in place, encouraged to call for assistance, adaptive devices used as needed, assess emotional state and support candidate  · Continue ASA and BB      David Blood NP  DNP, RN, [facility]    6/19/2017         Agree with note as outlined by  NP. I confirm findings in history and physical exam. No additional findings noted. Agree with plan as outlined above. Echo shows significant LV dysfunction. Recommend medical management. Discussed with family.     Joyce Mccullough MD offered, encouraged patient to communicate by available means, and support systems included in patient care. Problem: Gas Exchange - Impaired:  Goal: Levels of oxygenation will improve  Description  Levels of oxygenation will improve  3/1/2020 2330 by Tony Barton RN  Outcome: Ongoing  3/1/2020 1609 by Karli Mckeon RN  Outcome: Ongoing     Problem: Risk for Impaired Skin Integrity  Goal: Tissue integrity - skin and mucous membranes  Description  Structural intactness and normal physiological function of skin and  mucous membranes.   3/1/2020 2330 by Tony Barton RN  Outcome: Ongoing  3/1/2020 1609 by Karli Mckeon RN  Outcome: Ongoing     Problem: Nutrition  Goal: Optimal nutrition therapy  3/1/2020 2330 by Tony Barton RN  Outcome: Ongoing  3/1/2020 1609 by Karli Mckeon RN  Outcome: Ongoing
